# Patient Record
Sex: MALE | Race: BLACK OR AFRICAN AMERICAN | Employment: FULL TIME | URBAN - METROPOLITAN AREA
[De-identification: names, ages, dates, MRNs, and addresses within clinical notes are randomized per-mention and may not be internally consistent; named-entity substitution may affect disease eponyms.]

---

## 2017-01-16 ENCOUNTER — OFFICE VISIT (OUTPATIENT)
Dept: FAMILY MEDICINE CLINIC | Facility: CLINIC | Age: 42
End: 2017-01-16

## 2017-01-16 VITALS — SYSTOLIC BLOOD PRESSURE: 120 MMHG | RESPIRATION RATE: 16 BRPM | DIASTOLIC BLOOD PRESSURE: 80 MMHG | HEART RATE: 86 BPM

## 2017-01-16 DIAGNOSIS — IMO0001 UNCONTROLLED INSULIN DEPENDENT DIABETES MELLITUS: Primary | ICD-10-CM

## 2017-01-16 DIAGNOSIS — Z91.14 NON COMPLIANCE W MEDICATION REGIMEN: ICD-10-CM

## 2017-01-16 DIAGNOSIS — E78.00: ICD-10-CM

## 2017-01-16 PROCEDURE — 99214 OFFICE O/P EST MOD 30 MIN: CPT | Performed by: FAMILY MEDICINE

## 2017-01-17 NOTE — PROGRESS NOTES
Patient presents with: Follow - Up: diabetes re-check. Patito Dubon is a 35year old year old who presents for recheck of diabetes. Pt has been checking feet on a regular basis. Pt denies any tingling of the feet.  Pt denies any sx's of depress UNIT/ML Subcutaneous Solution, Inject 25 Units into the skin nightly., Disp: , Rfl:   •  Glucose Blood (SANJEEV CONTOUR NEXT TEST) In Vitro Strip, TEST THREE TIMES DAILY, Disp: 300 strip, Rfl: 0  •  Losartan Potassium 50 MG Oral Tab, Take 1 tablet (50 mg tot abdominal distention. Genitourinary: Negative for dysuria, hematuria and difficulty urinating. Musculoskeletal: Negative for myalgias, back pain, joint swelling, arthralgias and gait problem. Skin: Negative for color change and rash.    Neurological: Smokeless Status: Never Used                        Alcohol Use: Yes                Comment: rarely      Physical Exam  /80 mmHg  Pulse 86  Resp 16  Constitutional: Oriented to person, place, and time. No distress.    HEENT:  Normoc Preventing Acute Complications, Preventing Chronic Complications, Behavior Change Strategies, Risk Reduction Strategies   Reducing Risk: Daily foot checks, BP Control, Lipid Control, Dilated eye exam, Skin/dental care, Urine for microalbumin, Weight Contro

## 2017-02-02 RX ORDER — CALCIUM CARB/VITAMIN D3/VIT K1 500-100-40
TABLET,CHEWABLE ORAL
Qty: 100 EACH | Refills: 0 | Status: SHIPPED | OUTPATIENT
Start: 2017-02-02

## 2017-02-02 RX ORDER — LOSARTAN POTASSIUM 50 MG/1
50 TABLET ORAL
Qty: 90 TABLET | Refills: 0 | Status: SHIPPED | OUTPATIENT
Start: 2017-02-02 | End: 2017-03-31

## 2017-02-07 ENCOUNTER — TELEPHONE (OUTPATIENT)
Dept: FAMILY MEDICINE CLINIC | Facility: CLINIC | Age: 42
End: 2017-02-07

## 2017-02-07 NOTE — TELEPHONE ENCOUNTER
No need to fax referral to Dr. Ann Pinon. He is able to see referral for patient. Called patient and left message per HIPPA advising patient of this information. Advised patient to contact the office with any questions.

## 2017-02-08 ENCOUNTER — OFFICE VISIT (OUTPATIENT)
Dept: FAMILY MEDICINE CLINIC | Facility: CLINIC | Age: 42
End: 2017-02-08

## 2017-02-08 VITALS
DIASTOLIC BLOOD PRESSURE: 80 MMHG | RESPIRATION RATE: 16 BRPM | HEART RATE: 91 BPM | BODY MASS INDEX: 27 KG/M2 | TEMPERATURE: 99 F | OXYGEN SATURATION: 97 % | WEIGHT: 189 LBS | SYSTOLIC BLOOD PRESSURE: 118 MMHG

## 2017-02-08 DIAGNOSIS — M79.671 PAIN IN BOTH FEET: ICD-10-CM

## 2017-02-08 DIAGNOSIS — E11.42 TYPE 2 DIABETES MELLITUS WITH DIABETIC POLYNEUROPATHY, WITH LONG-TERM CURRENT USE OF INSULIN (HCC): Primary | ICD-10-CM

## 2017-02-08 DIAGNOSIS — R60.9 EDEMA, UNSPECIFIED TYPE: ICD-10-CM

## 2017-02-08 DIAGNOSIS — E78.5 HYPERLIPIDEMIA, UNSPECIFIED HYPERLIPIDEMIA TYPE: ICD-10-CM

## 2017-02-08 DIAGNOSIS — M79.672 PAIN IN BOTH FEET: ICD-10-CM

## 2017-02-08 DIAGNOSIS — G62.9 NEUROPATHY: ICD-10-CM

## 2017-02-08 DIAGNOSIS — M79.604 PAIN IN BOTH LOWER EXTREMITIES: ICD-10-CM

## 2017-02-08 DIAGNOSIS — M79.605 PAIN IN BOTH LOWER EXTREMITIES: ICD-10-CM

## 2017-02-08 DIAGNOSIS — Z79.4 TYPE 2 DIABETES MELLITUS WITH DIABETIC POLYNEUROPATHY, WITH LONG-TERM CURRENT USE OF INSULIN (HCC): Primary | ICD-10-CM

## 2017-02-08 PROCEDURE — 99214 OFFICE O/P EST MOD 30 MIN: CPT | Performed by: PHYSICIAN ASSISTANT

## 2017-02-08 RX ORDER — GABAPENTIN 300 MG/1
CAPSULE ORAL
Qty: 90 CAPSULE | Refills: 1 | Status: SHIPPED | OUTPATIENT
Start: 2017-02-08 | End: 2017-04-16

## 2017-02-08 NOTE — PROGRESS NOTES
HPI:   Himanshu Walker is a 35year old male who presents for recheck of his diabetes. He recently started taking Lantus for elevated A1C at 8.5 in December. He has noted since starting lantus his legs feel week and he has pain in both of his feet.  Pain LANCETS Does not apply Misc 1 lancet by Finger stick route daily. Disp: 1 Box Rfl: 0   aspirin (ASPIR-81) 81 MG Oral Tab EC Take 1 tablet by mouth daily.  Disp: 1 tablet Rfl: 0      No Known Allergies   Past Medical History   Diagnosis Date   • Type II or u starting low dose diuretic but patient does not want to start multiple medications   - Elevate feet and compression stockings-if swelling worsens he will start medication     Pain in both feet/pain in lower extremities/neuropathy   -     US ARTERIAL DUPLEX

## 2017-02-09 RX ORDER — GABAPENTIN 300 MG/1
CAPSULE ORAL
Qty: 270 CAPSULE | Refills: 1 | OUTPATIENT
Start: 2017-02-09

## 2017-03-31 RX ORDER — LOSARTAN POTASSIUM 50 MG/1
50 TABLET ORAL
Qty: 90 TABLET | Refills: 0 | Status: SHIPPED | OUTPATIENT
Start: 2017-03-31 | End: 2018-08-23

## 2017-04-06 ENCOUNTER — TELEPHONE (OUTPATIENT)
Dept: FAMILY MEDICINE CLINIC | Facility: CLINIC | Age: 42
End: 2017-04-06

## 2017-04-06 DIAGNOSIS — L30.9 ECZEMA, UNSPECIFIED TYPE: Primary | ICD-10-CM

## 2017-04-06 NOTE — TELEPHONE ENCOUNTER
referral request 1 and 2   Received: Yesterday       [facility]       Cc: [facility] Referral Pool       Phone Number: [phone]                     Patient Name:Farzad Castro   MIGUELITO:[birth year]   Reason for the orde

## 2017-04-10 NOTE — TELEPHONE ENCOUNTER
Called the pt, went over POC below. Pt agreed to have the [address] done first. Provided scheduling information. Also pt stated he has a FU appt with his dermatologist Dr. Prashanth Granger. Pt requested another referral. Beecher Goldmann to place referral for Dr. Prashanth Granger?  Last referr

## 2017-04-19 RX ORDER — GABAPENTIN 300 MG/1
CAPSULE ORAL
Qty: 270 CAPSULE | Refills: 0 | Status: SHIPPED | OUTPATIENT
Start: 2017-04-19 | End: 2017-09-29

## 2017-04-19 RX ORDER — GABAPENTIN 300 MG/1
300 CAPSULE ORAL 3 TIMES DAILY
Qty: 90 CAPSULE | Refills: 0 | Status: SHIPPED | OUTPATIENT
Start: 2017-04-19 | End: 2017-04-19

## 2017-04-23 ENCOUNTER — HOSPITAL ENCOUNTER (OUTPATIENT)
Dept: ULTRASOUND IMAGING | Age: 42
Discharge: HOME | End: 2017-04-23
Attending: PHYSICIAN ASSISTANT
Payer: COMMERCIAL

## 2017-04-23 DIAGNOSIS — M79.671 PAIN IN BOTH FEET: ICD-10-CM

## 2017-04-23 DIAGNOSIS — E11.42 TYPE 2 DIABETES MELLITUS WITH DIABETIC POLYNEUROPATHY, WITH LONG-TERM CURRENT USE OF INSULIN (HCC): ICD-10-CM

## 2017-04-23 DIAGNOSIS — M79.604 PAIN IN BOTH LOWER EXTREMITIES: ICD-10-CM

## 2017-04-23 DIAGNOSIS — R60.9 EDEMA, UNSPECIFIED TYPE: ICD-10-CM

## 2017-04-23 DIAGNOSIS — Z79.4 TYPE 2 DIABETES MELLITUS WITH DIABETIC POLYNEUROPATHY, WITH LONG-TERM CURRENT USE OF INSULIN (HCC): ICD-10-CM

## 2017-04-23 DIAGNOSIS — M79.605 PAIN IN BOTH LOWER EXTREMITIES: ICD-10-CM

## 2017-04-23 DIAGNOSIS — M79.672 PAIN IN BOTH FEET: ICD-10-CM

## 2017-04-23 PROCEDURE — 93925 LOWER EXTREMITY STUDY: CPT | Performed by: RADIOLOGY

## 2017-04-28 ENCOUNTER — OFFICE VISIT (OUTPATIENT)
Dept: FAMILY MEDICINE CLINIC | Facility: CLINIC | Age: 42
End: 2017-04-28

## 2017-04-28 VITALS
SYSTOLIC BLOOD PRESSURE: 130 MMHG | DIASTOLIC BLOOD PRESSURE: 78 MMHG | BODY MASS INDEX: 27 KG/M2 | WEIGHT: 190 LBS | TEMPERATURE: 99 F | RESPIRATION RATE: 16 BRPM | HEART RATE: 89 BPM | OXYGEN SATURATION: 98 %

## 2017-04-28 DIAGNOSIS — J30.2 SEASONAL ALLERGIC RHINITIS, UNSPECIFIED ALLERGIC RHINITIS TRIGGER: ICD-10-CM

## 2017-04-28 DIAGNOSIS — IMO0001 UNCONTROLLED INSULIN DEPENDENT DIABETES MELLITUS: Primary | ICD-10-CM

## 2017-04-28 DIAGNOSIS — I10 ESSENTIAL HYPERTENSION: ICD-10-CM

## 2017-04-28 DIAGNOSIS — E78.00 PURE HYPERCHOLESTEROLEMIA WITH TARGET LOW DENSITY LIPOPROTEIN (LDL) CHOLESTEROL LESS THAN 130 MG/DL: ICD-10-CM

## 2017-04-28 PROCEDURE — 99214 OFFICE O/P EST MOD 30 MIN: CPT | Performed by: FAMILY MEDICINE

## 2017-04-28 NOTE — PROGRESS NOTES
Patient presents with:  Sinus Problem: pt c\o of sinus, test results       Annalisa Mcintosh is a 35year old year old who presents for recheck of diabetes. Pt has been checking feet on a regular basis. Pt denies any tingling of the feet.  Pt denies any sx MOUTH THREE TIMES DAILY, Disp: 270 capsule, Rfl: 0  •  MetFORMIN HCl 1000 MG Oral Tab, TAKE 1 TABLET(1000 MG) BY MOUTH TWICE DAILY WITH MEALS, Disp: 180 tablet, Rfl: 0  •  Losartan Potassium 50 MG Oral Tab, Take 1 tablet (50 mg total) by mouth once daily. , distention. Genitourinary: Negative for dysuria, hematuria and difficulty urinating. Musculoskeletal: Negative for myalgias, back pain, joint swelling, arthralgias and gait problem. Skin: Negative for color change and rash.    Neurological: Negative f rarely      Physical Exam  /78 mmHg  Pulse 89  Temp(Src) 98.9 °F (37.2 °C) (Oral)  Resp 16  Wt 190 lb  SpO2 98%  Constitutional: Oriented to person, place, and time. No distress. HEENT:  Normocephalic and atraumatic.  Hearing and tympanic membranes Using Medications, Monitoring, Preventing Acute Complications, Preventing Chronic Complications, Behavior Change Strategies, Risk Reduction Strategies   Reducing Risk: Daily foot checks, BP Control, Lipid Control, Dilated eye exam, Skin/dental care, Urine

## 2017-05-08 ENCOUNTER — TELEPHONE (OUTPATIENT)
Dept: FAMILY MEDICINE CLINIC | Facility: CLINIC | Age: 42
End: 2017-05-08

## 2017-05-08 RX ORDER — MONTELUKAST SODIUM 10 MG/1
10 TABLET ORAL DAILY
Qty: 30 TABLET | Refills: 3 | Status: SHIPPED | OUTPATIENT
Start: 2017-05-08 | End: 2017-05-08

## 2017-05-08 RX ORDER — MONTELUKAST SODIUM 10 MG/1
10 TABLET ORAL DAILY
Qty: 30 TABLET | Refills: 3 | Status: SHIPPED | OUTPATIENT
Start: 2017-05-08 | End: 2017-09-21

## 2017-06-14 ENCOUNTER — TELEPHONE (OUTPATIENT)
Dept: FAMILY MEDICINE CLINIC | Facility: CLINIC | Age: 42
End: 2017-06-14

## 2017-06-14 ENCOUNTER — HOSPITAL ENCOUNTER (OUTPATIENT)
Dept: GENERAL RADIOLOGY | Age: 42
Discharge: HOME | End: 2017-06-14
Attending: PHYSICIAN ASSISTANT
Payer: COMMERCIAL

## 2017-06-14 ENCOUNTER — OFFICE VISIT (OUTPATIENT)
Dept: FAMILY MEDICINE CLINIC | Facility: CLINIC | Age: 42
End: 2017-06-14

## 2017-06-14 VITALS
OXYGEN SATURATION: 96 % | DIASTOLIC BLOOD PRESSURE: 78 MMHG | WEIGHT: 190 LBS | TEMPERATURE: 98 F | HEART RATE: 80 BPM | SYSTOLIC BLOOD PRESSURE: 112 MMHG | BODY MASS INDEX: 27 KG/M2 | RESPIRATION RATE: 16 BRPM

## 2017-06-14 DIAGNOSIS — E78.00 PURE HYPERCHOLESTEROLEMIA WITH TARGET LOW DENSITY LIPOPROTEIN (LDL) CHOLESTEROL LESS THAN 130 MG/DL: ICD-10-CM

## 2017-06-14 DIAGNOSIS — R07.81 RIB PAIN ON LEFT SIDE: Primary | ICD-10-CM

## 2017-06-14 DIAGNOSIS — IMO0001 UNCONTROLLED INSULIN DEPENDENT DIABETES MELLITUS: ICD-10-CM

## 2017-06-14 DIAGNOSIS — R07.81 RIB PAIN ON LEFT SIDE: ICD-10-CM

## 2017-06-14 PROCEDURE — 99213 OFFICE O/P EST LOW 20 MIN: CPT | Performed by: PHYSICIAN ASSISTANT

## 2017-06-14 PROCEDURE — 71101 X-RAY EXAM UNILAT RIBS/CHEST: CPT | Performed by: PHYSICIAN ASSISTANT

## 2017-06-14 NOTE — PROGRESS NOTES
Patient presents with:  Low Back Pain: Pt c/o LT low back pain, he fell in the shower         HPI:  Left lower side pain for one day. Patient fell in the shower this am. He landed on the left side. He has pain with coughing/laughing. No SOB. No chest pain. gallops  CHEST: +TTP left lower lateral ribs. Pain with flexion of the back   SKIN:no rashes/ecchymosis on the chest or back. Back:  Normal on inspection, no pain on palpation of the spinal and paraspinal muscles.  FROM       A/P    Rib pain on left side

## 2017-06-22 ENCOUNTER — TELEPHONE (OUTPATIENT)
Dept: FAMILY MEDICINE CLINIC | Facility: CLINIC | Age: 42
End: 2017-06-22

## 2017-06-22 DIAGNOSIS — E11.9 TYPE 2 DIABETES MELLITUS WITHOUT COMPLICATION, WITH LONG-TERM CURRENT USE OF INSULIN (HCC): Primary | ICD-10-CM

## 2017-06-22 DIAGNOSIS — Z79.4 TYPE 2 DIABETES MELLITUS WITHOUT COMPLICATION, WITH LONG-TERM CURRENT USE OF INSULIN (HCC): Primary | ICD-10-CM

## 2017-06-23 ENCOUNTER — OFFICE VISIT (OUTPATIENT)
Dept: FAMILY MEDICINE CLINIC | Facility: CLINIC | Age: 42
End: 2017-06-23

## 2017-06-23 VITALS
TEMPERATURE: 98 F | WEIGHT: 188 LBS | SYSTOLIC BLOOD PRESSURE: 124 MMHG | HEART RATE: 90 BPM | DIASTOLIC BLOOD PRESSURE: 78 MMHG | RESPIRATION RATE: 16 BRPM | BODY MASS INDEX: 27 KG/M2 | OXYGEN SATURATION: 97 %

## 2017-06-23 DIAGNOSIS — IMO0001 UNCONTROLLED INSULIN DEPENDENT DIABETES MELLITUS: ICD-10-CM

## 2017-06-23 DIAGNOSIS — B00.9 HERPES SIMPLEX: Primary | ICD-10-CM

## 2017-06-23 PROCEDURE — 99213 OFFICE O/P EST LOW 20 MIN: CPT | Performed by: PHYSICIAN ASSISTANT

## 2017-06-23 RX ORDER — VALACYCLOVIR HYDROCHLORIDE 500 MG/1
500 TABLET, FILM COATED ORAL 2 TIMES DAILY
Qty: 6 TABLET | Refills: 1 | Status: SHIPPED | OUTPATIENT
Start: 2017-06-23 | End: 2018-07-26

## 2017-06-23 NOTE — PROGRESS NOTES
CHIEF COMPLAINT:   Patient presents with:  Low Back Pain: Following up on low back pain due to fall  Rash: Pt c/o rash on RT arm X 3 days       HPI:     Flaquita Oseguera is a 38year old male who presents with concerns of rash on his right upper arm for 3 Alcohol Use: Yes                Comment: rarely       REVIEW OF SYSTEMS:   GENERAL: feels well otherwise, no fever, no chills. SKIN: as above. CHEST: no chest pains, no palpitations.   LUNGS: denies shortness of breath with exertion or rest. No wheezing,

## 2017-07-26 ENCOUNTER — TELEPHONE (OUTPATIENT)
Dept: FAMILY MEDICINE CLINIC | Facility: CLINIC | Age: 42
End: 2017-07-26

## 2017-08-03 NOTE — TELEPHONE ENCOUNTER
LF: 4/17/2017    LOV: 4/28/2017    Pending Prescriptions Disp Refills    METFORMIN HCL 1000 MG Oral Tab [Pharmacy Med Name: METFORMIN 1000MG TABLETS] 180 tablet 0     Sig: TAKE 1 TABLET(1000 MG) BY MOUTH TWICE DAILY WITH MEALS         Pt needs labs last Hg

## 2017-09-13 ENCOUNTER — MED REC SCAN ONLY (OUTPATIENT)
Dept: FAMILY MEDICINE CLINIC | Facility: CLINIC | Age: 42
End: 2017-09-13

## 2017-09-21 RX ORDER — MONTELUKAST SODIUM 10 MG/1
TABLET ORAL
Qty: 30 TABLET | Refills: 0 | Status: SHIPPED | OUTPATIENT
Start: 2017-09-21 | End: 2017-10-23

## 2017-10-01 NOTE — TELEPHONE ENCOUNTER
LF: 04/19/2017   LOV: 06/23/2017    Pending Prescriptions Disp Refills    GABAPENTIN 300 MG Oral Cap [Pharmacy Med Name: GABAPENTIN 300MG CAPSULES] 270 capsule 0     Sig: TAKE 1 CAPSULE(300 MG) BY MOUTH THREE TIMES DAILY          Please approve or deny Rx.

## 2017-10-02 RX ORDER — GABAPENTIN 300 MG/1
CAPSULE ORAL
Qty: 270 CAPSULE | Refills: 0 | OUTPATIENT
Start: 2017-10-02

## 2017-10-02 RX ORDER — GABAPENTIN 300 MG/1
300 CAPSULE ORAL 3 TIMES DAILY
Qty: 90 CAPSULE | Refills: 0 | Status: SHIPPED | OUTPATIENT
Start: 2017-10-02 | End: 2017-11-27

## 2017-10-13 ENCOUNTER — MED REC SCAN ONLY (OUTPATIENT)
Dept: FAMILY MEDICINE CLINIC | Facility: CLINIC | Age: 42
End: 2017-10-13

## 2017-10-23 RX ORDER — MONTELUKAST SODIUM 10 MG/1
TABLET ORAL
Qty: 30 TABLET | Refills: 0 | Status: SHIPPED | OUTPATIENT
Start: 2017-10-23 | End: 2017-11-27

## 2017-10-23 NOTE — TELEPHONE ENCOUNTER
Medication(s) to Refill:   Pending Prescriptions Disp Refills    MONTELUKAST SODIUM 10 MG Oral Tab [Pharmacy Med Name: MONTELUKAST 10MG TABLETS] 30 tablet 0     Sig: TAKE 1 TABLET(10 MG) BY MOUTH DAILY           Last Time Medication was Filled:  09/21/17

## 2017-10-25 ENCOUNTER — TELEPHONE (OUTPATIENT)
Dept: FAMILY MEDICINE CLINIC | Facility: CLINIC | Age: 42
End: 2017-10-25

## 2017-10-25 DIAGNOSIS — Z13.0 SCREENING FOR ENDOCRINE, METABOLIC AND IMMUNITY DISORDER: ICD-10-CM

## 2017-10-25 DIAGNOSIS — IMO0001 UNCONTROLLED TYPE 2 DIABETES MELLITUS WITHOUT COMPLICATION, WITHOUT LONG-TERM CURRENT USE OF INSULIN: Primary | ICD-10-CM

## 2017-10-25 DIAGNOSIS — Z13.228 SCREENING FOR ENDOCRINE, METABOLIC AND IMMUNITY DISORDER: ICD-10-CM

## 2017-10-25 DIAGNOSIS — Z12.5 SCREENING FOR PROSTATE CANCER: ICD-10-CM

## 2017-10-25 DIAGNOSIS — Z13.29 SCREENING FOR ENDOCRINE, METABOLIC AND IMMUNITY DISORDER: ICD-10-CM

## 2017-10-25 NOTE — TELEPHONE ENCOUNTER
LVM for patient reminding him to do labs and schedule with Dr. Pete Lockett. Will also send [facility] message.

## 2017-10-27 ENCOUNTER — TELEPHONE (OUTPATIENT)
Dept: FAMILY MEDICINE CLINIC | Facility: CLINIC | Age: 42
End: 2017-10-27

## 2017-10-27 DIAGNOSIS — L30.9 ECZEMA, UNSPECIFIED TYPE: Primary | ICD-10-CM

## 2017-10-27 NOTE — TELEPHONE ENCOUNTER
Dr Yolis Vyas office is calling to request a referral for patient's annual check up, scheduled today.

## 2017-10-30 ENCOUNTER — TELEPHONE (OUTPATIENT)
Dept: FAMILY MEDICINE CLINIC | Facility: CLINIC | Age: 42
End: 2017-10-30

## 2017-11-14 ENCOUNTER — TELEPHONE (OUTPATIENT)
Dept: FAMILY MEDICINE CLINIC | Facility: CLINIC | Age: 42
End: 2017-11-14

## 2017-11-14 ENCOUNTER — LAB ENCOUNTER (OUTPATIENT)
Dept: LAB | Age: 42
End: 2017-11-14
Attending: FAMILY MEDICINE
Payer: COMMERCIAL

## 2017-11-14 DIAGNOSIS — IMO0001 UNCONTROLLED TYPE 2 DIABETES MELLITUS WITHOUT COMPLICATION, WITHOUT LONG-TERM CURRENT USE OF INSULIN: ICD-10-CM

## 2017-11-14 DIAGNOSIS — Z13.0 SCREENING FOR ENDOCRINE, METABOLIC AND IMMUNITY DISORDER: ICD-10-CM

## 2017-11-14 DIAGNOSIS — Z13.228 SCREENING FOR ENDOCRINE, METABOLIC AND IMMUNITY DISORDER: ICD-10-CM

## 2017-11-14 DIAGNOSIS — Z79.4 TYPE 2 DIABETES MELLITUS WITHOUT COMPLICATION, WITH LONG-TERM CURRENT USE OF INSULIN (HCC): ICD-10-CM

## 2017-11-14 DIAGNOSIS — E78.5 HYPERLIPIDEMIA, UNSPECIFIED HYPERLIPIDEMIA TYPE: ICD-10-CM

## 2017-11-14 DIAGNOSIS — Z13.29 SCREENING FOR ENDOCRINE, METABOLIC AND IMMUNITY DISORDER: ICD-10-CM

## 2017-11-14 DIAGNOSIS — E11.42 TYPE 2 DIABETES MELLITUS WITH DIABETIC POLYNEUROPATHY, WITH LONG-TERM CURRENT USE OF INSULIN (HCC): ICD-10-CM

## 2017-11-14 DIAGNOSIS — E11.9 TYPE 2 DIABETES MELLITUS WITHOUT COMPLICATION, WITH LONG-TERM CURRENT USE OF INSULIN (HCC): ICD-10-CM

## 2017-11-14 DIAGNOSIS — Z79.4 TYPE 2 DIABETES MELLITUS WITH DIABETIC POLYNEUROPATHY, WITH LONG-TERM CURRENT USE OF INSULIN (HCC): ICD-10-CM

## 2017-11-14 DIAGNOSIS — Z12.5 SCREENING FOR PROSTATE CANCER: ICD-10-CM

## 2017-11-14 PROCEDURE — 80061 LIPID PANEL: CPT

## 2017-11-14 PROCEDURE — 80053 COMPREHEN METABOLIC PANEL: CPT

## 2017-11-14 PROCEDURE — 36415 COLL VENOUS BLD VENIPUNCTURE: CPT

## 2017-11-14 PROCEDURE — 82043 UR ALBUMIN QUANTITATIVE: CPT

## 2017-11-14 PROCEDURE — 82306 VITAMIN D 25 HYDROXY: CPT

## 2017-11-14 PROCEDURE — 84443 ASSAY THYROID STIM HORMONE: CPT

## 2017-11-14 PROCEDURE — 83036 HEMOGLOBIN GLYCOSYLATED A1C: CPT

## 2017-11-14 PROCEDURE — 82570 ASSAY OF URINE CREATININE: CPT

## 2017-11-14 PROCEDURE — 85025 COMPLETE CBC W/AUTO DIFF WBC: CPT

## 2017-11-17 ENCOUNTER — TELEPHONE (OUTPATIENT)
Dept: FAMILY MEDICINE CLINIC | Facility: CLINIC | Age: 42
End: 2017-11-17

## 2017-11-17 DIAGNOSIS — IMO0001 UNCONTROLLED INSULIN DEPENDENT DIABETES MELLITUS: Primary | ICD-10-CM

## 2017-11-17 NOTE — TELEPHONE ENCOUNTER
----- Message from Chadwick Barrow PA-C sent at 11/15/2017 11:56 AM CST -----  A1C has increased. Very poor control. Patient needs to see DM clinic-[facility]ou[facility]. Elevated cholesterol-start atorvastatin 10 mg daily. Low fat diet/exercise.  Repeat Lipid

## 2017-11-21 RX ORDER — ATORVASTATIN CALCIUM 10 MG/1
10 TABLET, FILM COATED ORAL NIGHTLY
Qty: 30 TABLET | Refills: 2 | Status: SHIPPED | OUTPATIENT
Start: 2017-11-21 | End: 2018-06-08

## 2017-11-21 NOTE — TELEPHONE ENCOUNTER
Called and spoke with pt. Pt informed of lab results below. Pt states understanding and agrees to plan. Rx sent to pharmacy.  Pt states he will getthe contact information for the DM clinic when he is in the office next week for his physical. Pt has no furth

## 2017-11-27 RX ORDER — GABAPENTIN 300 MG/1
CAPSULE ORAL
Qty: 90 CAPSULE | Refills: 0 | Status: SHIPPED | OUTPATIENT
Start: 2017-11-27 | End: 2018-01-29

## 2017-11-27 RX ORDER — MONTELUKAST SODIUM 10 MG/1
TABLET ORAL
Qty: 30 TABLET | Refills: 0 | Status: SHIPPED | OUTPATIENT
Start: 2017-11-27 | End: 2018-01-17

## 2017-11-27 NOTE — TELEPHONE ENCOUNTER
Medication(s) to Refill:   Pending Prescriptions Disp Refills    GABAPENTIN 300 MG Oral Cap [Pharmacy Med Name: GABAPENTIN 300MG CAPSULES] 90 capsule 0     Sig: TAKE 1 CAPSULE(300 MG) BY MOUTH THREE TIMES DAILY           Last Time Medication was Filled:  1

## 2017-11-28 ENCOUNTER — OFFICE VISIT (OUTPATIENT)
Dept: FAMILY MEDICINE CLINIC | Facility: CLINIC | Age: 42
End: 2017-11-28

## 2017-11-28 VITALS
DIASTOLIC BLOOD PRESSURE: 70 MMHG | SYSTOLIC BLOOD PRESSURE: 118 MMHG | HEIGHT: 71 IN | OXYGEN SATURATION: 99 % | HEART RATE: 82 BPM | WEIGHT: 186 LBS | RESPIRATION RATE: 16 BRPM | TEMPERATURE: 98 F | BODY MASS INDEX: 26.04 KG/M2

## 2017-11-28 DIAGNOSIS — Z00.00 ANNUAL PHYSICAL EXAM: Primary | ICD-10-CM

## 2017-11-28 DIAGNOSIS — IMO0001 UNCONTROLLED INSULIN DEPENDENT DIABETES MELLITUS: ICD-10-CM

## 2017-11-28 DIAGNOSIS — E78.00 PURE HYPERCHOLESTEROLEMIA WITH TARGET LOW DENSITY LIPOPROTEIN (LDL) CHOLESTEROL LESS THAN 130 MG/DL: ICD-10-CM

## 2017-11-28 DIAGNOSIS — Z23 NEEDS FLU SHOT: ICD-10-CM

## 2017-11-28 PROCEDURE — 90471 IMMUNIZATION ADMIN: CPT | Performed by: FAMILY MEDICINE

## 2017-11-28 PROCEDURE — 99214 OFFICE O/P EST MOD 30 MIN: CPT | Performed by: FAMILY MEDICINE

## 2017-11-28 PROCEDURE — 99396 PREV VISIT EST AGE 40-64: CPT | Performed by: FAMILY MEDICINE

## 2017-11-28 PROCEDURE — 90686 IIV4 VACC NO PRSV 0.5 ML IM: CPT | Performed by: FAMILY MEDICINE

## 2017-11-28 NOTE — PROGRESS NOTES
Patient presents with:  Physical: lab results       Emi Lam is a 38year old year old who presents for recheck of diabetes. Pt has been checking feet on a regular basis. Pt denies any tingling of the feet. Pt denies any sx's of depression.   Pt c - -   PHQ2 Date - - - - - - - -   PHQ2 Score - - - - - - - -   PHQ4 Score - - - - - - - -       Current medications:   Current Outpatient Prescriptions:   •  insulin glargine (LANTUS) 100 UNIT/ML Subcutaneous Solution, Inject 100 Units into the skin nightl given: Not Answered      Immunization History   Administered Date(s) Administered   • Fluad High dose 65 yr and older (25871) 11/28/2017   • Influenza 10/30/2014   • Influenza Vaccine, No Preserv, 3YR + 10/20/2015, 10/31/2016   • TDAP 12/30/2009       Revi nightly. Disp: 30 tablet Rfl: 2   MetFORMIN HCl 1000 MG Oral Tab TAKE 1 TABLET(1000 MG) BY MOUTH TWICE DAILY WITH MEALS Disp: 180 tablet Rfl: 0   Losartan Potassium 50 MG Oral Tab Take 1 tablet (50 mg total) by mouth once daily.  Disp: 90 tablet Rfl: 0   In tender, no masses, no organomegaly or hernias. Musculoskeletal: Normal range of motion. No edema and no tenderness. No effusions. Lymphadenopathy: No cervical adenopathy. Neurological: Normal reflexes. No cranial nerve deficit or sensory deficit.  Nnor this Visit:  Signed Prescriptions Disp Refills    insulin glargine (LANTUS) 100 UNIT/ML Subcutaneous Solution 4 pen 0      Sig: Inject 100 Units into the skin nightly.  Lot: [id number]: 01/2020           Imaging & Consults:  FLU VACCINE ADJUVANT IM    Follow

## 2017-12-04 ENCOUNTER — TELEPHONE (OUTPATIENT)
Dept: FAMILY MEDICINE CLINIC | Facility: CLINIC | Age: 42
End: 2017-12-04

## 2017-12-04 NOTE — TELEPHONE ENCOUNTER
[facility]  Pharmacy will give him some to hold him over

## 2017-12-06 RX ORDER — LOSARTAN POTASSIUM 50 MG/1
TABLET ORAL
Qty: 90 TABLET | Refills: 0 | Status: SHIPPED | OUTPATIENT
Start: 2017-12-06 | End: 2018-01-29

## 2017-12-20 RX ORDER — LOSARTAN POTASSIUM 50 MG/1
TABLET ORAL
Qty: 90 TABLET | Refills: 0 | OUTPATIENT
Start: 2017-12-20

## 2018-01-17 ENCOUNTER — TELEPHONE (OUTPATIENT)
Dept: FAMILY MEDICINE CLINIC | Facility: CLINIC | Age: 43
End: 2018-01-17

## 2018-01-17 DIAGNOSIS — IMO0001 UNCONTROLLED INSULIN DEPENDENT DIABETES MELLITUS: Primary | ICD-10-CM

## 2018-01-17 RX ORDER — MONTELUKAST SODIUM 10 MG/1
TABLET ORAL
Qty: 90 TABLET | Refills: 0 | Status: SHIPPED | OUTPATIENT
Start: 2018-01-17 | End: 2018-05-16

## 2018-01-17 NOTE — TELEPHONE ENCOUNTER
Pt was called and was informed that he's due for a diabetic eye exam. He verbalized understanding and said he will call and make the mojgan.  He was also reminded about getting his diabetic labs done in Feb.

## 2018-01-30 RX ORDER — LOSARTAN POTASSIUM 50 MG/1
TABLET ORAL
Qty: 90 TABLET | Refills: 0 | Status: SHIPPED | OUTPATIENT
Start: 2018-01-30 | End: 2018-03-13

## 2018-01-30 RX ORDER — GABAPENTIN 300 MG/1
CAPSULE ORAL
Qty: 90 CAPSULE | Refills: 0 | Status: SHIPPED | OUTPATIENT
Start: 2018-01-30 | End: 2018-04-11

## 2018-03-13 ENCOUNTER — TELEPHONE (OUTPATIENT)
Dept: FAMILY MEDICINE CLINIC | Facility: CLINIC | Age: 43
End: 2018-03-13

## 2018-03-13 ENCOUNTER — OFFICE VISIT (OUTPATIENT)
Dept: FAMILY MEDICINE CLINIC | Facility: CLINIC | Age: 43
End: 2018-03-13

## 2018-03-13 VITALS
TEMPERATURE: 99 F | SYSTOLIC BLOOD PRESSURE: 108 MMHG | DIASTOLIC BLOOD PRESSURE: 86 MMHG | OXYGEN SATURATION: 99 % | HEIGHT: 71 IN | WEIGHT: 157 LBS | BODY MASS INDEX: 21.98 KG/M2 | HEART RATE: 74 BPM
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
## 2018-03-13 DIAGNOSIS — M62.838 MUSCLE SPASM: ICD-10-CM
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
## 2018-03-13 DIAGNOSIS — L30.9 ECZEMA, UNSPECIFIED TYPE: ICD-10-CM

## 2018-03-13 DIAGNOSIS — IMO0001 UNCONTROLLED INSULIN DEPENDENT DIABETES MELLITUS: ICD-10-CM

## 2018-03-13 DIAGNOSIS — M54.2 NECK PAIN ON LEFT SIDE: ICD-10-CM

## 2018-03-13 PROCEDURE — 99214 OFFICE O/P EST MOD 30 MIN: CPT | Performed by: FAMILY MEDICINE

## 2018-03-13 RX ORDER — CYCLOBENZAPRINE HCL 10 MG
10 TABLET ORAL NIGHTLY PRN
Qty: 20 TABLET | Refills: 0 | Status: SHIPPED | OUTPATIENT
Start: 2018-03-13 | End: 2018-04-02

## 2018-03-13 RX ORDER — DICLOFENAC SODIUM 75 MG/1
TABLET, DELAYED RELEASE ORAL
Qty: 180 TABLET | Refills: 3 | Status: SHIPPED | OUTPATIENT
Start: 2018-03-13 | End: 2018-08-23

## 2018-03-13 RX ORDER — DICLOFENAC SODIUM 75 MG/1
75 TABLET, DELAYED RELEASE ORAL 2 TIMES DAILY PRN
Qty: 30 TABLET | Refills: 3 | Status: SHIPPED | OUTPATIENT
Start: 2018-03-13 | End: 2018-03-13
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
## 2018-03-27 ENCOUNTER — HOSPITAL ENCOUNTER (OUTPATIENT)
Dept: GENERAL RADIOLOGY | Age: 43
Discharge: HOME | End: 2018-03-27
Attending: FAMILY MEDICINE
Payer: COMMERCIAL

## 2018-03-27 DIAGNOSIS — M62.838 MUSCLE SPASM: ICD-10-CM

## 2018-03-27 DIAGNOSIS — M54.2 NECK PAIN ON LEFT SIDE: ICD-10-CM
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
## 2018-03-27 PROCEDURE — 73030 X-RAY EXAM OF SHOULDER: CPT | Performed by: FAMILY MEDICINE

## 2018-03-27 PROCEDURE — 72050 X-RAY EXAM NECK SPINE 4/5VWS: CPT | Performed by: FAMILY MEDICINE

## 2018-03-28 ENCOUNTER — TELEPHONE (OUTPATIENT)
Dept: FAMILY MEDICINE CLINIC | Facility: CLINIC | Age: 43
End: 2018-03-28

## 2018-03-28 ENCOUNTER — PATIENT OUTREACH (OUTPATIENT)
Dept: FAMILY MEDICINE CLINIC | Facility: CLINIC | Age: 43
End: 2018-03-28

## 2018-03-28 NOTE — TELEPHONE ENCOUNTER
----- Message from Junita Duane, MD sent at 3/27/2018  4:53 PM CDT -----  Mild arthritis, no fracture.

## 2018-03-28 NOTE — TELEPHONE ENCOUNTER
XR SHOULDER, COMPLETE (MIN 2 VIEWS), LEFT (CPT=73030)   Order: [id number]   Status:  Final result   Visible to patient:  Yes (MyChart)   Dx:  Muscle spasm; Neck pain on left side;. ..    Notes recorded by Suhail Gee MD on 3/27/2018 at 4:53 PM CDT  Negative

## 2018-04-04 ENCOUNTER — MED REC SCAN ONLY (OUTPATIENT)
Dept: FAMILY MEDICINE CLINIC | Facility: CLINIC | Age: 43
End: 2018-04-04

## 2018-04-06 ENCOUNTER — OFFICE VISIT (OUTPATIENT)
Dept: PHYSICAL THERAPY | Age: 43
End: 2018-04-06
Attending: FAMILY MEDICINE
Payer: COMMERCIAL
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
## 2018-04-06 DIAGNOSIS — M62.838 MUSCLE SPASM: ICD-10-CM

## 2018-04-06 DIAGNOSIS — M54.2 NECK PAIN ON LEFT SIDE: ICD-10-CM

## 2018-04-06 PROCEDURE — 97161 PT EVAL LOW COMPLEX 20 MIN: CPT

## 2018-04-06 PROCEDURE — 97530 THERAPEUTIC ACTIVITIES: CPT
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
## 2018-04-09 ENCOUNTER — APPOINTMENT (OUTPATIENT)
Dept: PHYSICAL THERAPY | Age: 43
End: 2018-04-09
Attending: FAMILY MEDICINE
Payer: COMMERCIAL

## 2018-04-10 ENCOUNTER — OFFICE VISIT (OUTPATIENT)
Dept: PHYSICAL THERAPY | Age: 43
End: 2018-04-10
Attending: FAMILY MEDICINE
Payer: COMMERCIAL

## 2018-04-10 PROCEDURE — 97140 MANUAL THERAPY 1/> REGIONS: CPT

## 2018-04-10 PROCEDURE — 97110 THERAPEUTIC EXERCISES: CPT

## 2018-04-10 NOTE — PROGRESS NOTES
Dx: Cervical Strain         Authorized # of Visits:  8         Next MD visit: none scheduled  Fall Risk: standard         Precautions: n/a             Subjective: States that the Allen Gonzalez has been more sore over the weekend as he was doing more exercises.

## 2018-04-11 ENCOUNTER — TELEPHONE (OUTPATIENT)
Dept: FAMILY MEDICINE CLINIC | Facility: CLINIC | Age: 43
End: 2018-04-11

## 2018-04-11 DIAGNOSIS — IMO0001 UNCONTROLLED INSULIN DEPENDENT DIABETES MELLITUS: ICD-10-CM

## 2018-04-11 RX ORDER — GABAPENTIN 300 MG/1
CAPSULE ORAL
Qty: 90 CAPSULE | Refills: 0 | Status: SHIPPED | OUTPATIENT
Start: 2018-04-11 | End: 2018-07-09

## 2018-04-11 NOTE — TELEPHONE ENCOUNTER
Gabapentin LF: 1/30/18  Lantus LF: 11/28/17   LOV: 3/13/18  Please approve or deny pending Rx. Thank you!

## 2018-04-11 NOTE — TELEPHONE ENCOUNTER
Please reach out to patient to have him see you. Uncontrolled diabetic and non compliant with treatment.

## 2018-04-12 RX ORDER — GABAPENTIN 300 MG/1
CAPSULE ORAL
Qty: 90 CAPSULE | Refills: 0 | OUTPATIENT
Start: 2018-04-12

## 2018-04-13 ENCOUNTER — APPOINTMENT (OUTPATIENT)
Dept: PHYSICAL THERAPY | Age: 43
End: 2018-04-13
Attending: FAMILY MEDICINE
Payer: COMMERCIAL

## 2018-04-13 DIAGNOSIS — IMO0001 UNCONTROLLED INSULIN DEPENDENT DIABETES MELLITUS: ICD-10-CM

## 2018-04-21 ENCOUNTER — TELEPHONE (OUTPATIENT)
Dept: FAMILY MEDICINE CLINIC | Facility: CLINIC | Age: 43
End: 2018-04-21

## 2018-05-07 ENCOUNTER — MED REC SCAN ONLY (OUTPATIENT)
Dept: FAMILY MEDICINE CLINIC | Facility: CLINIC | Age: 43
End: 2018-05-07

## 2018-05-07 ENCOUNTER — PATIENT OUTREACH (OUTPATIENT)
Dept: FAMILY MEDICINE CLINIC | Facility: CLINIC | Age: 43
End: 2018-05-07

## 2018-05-07 RX ORDER — LOSARTAN POTASSIUM 50 MG/1
TABLET ORAL
Qty: 90 TABLET | Refills: 0 | Status: SHIPPED | OUTPATIENT
Start: 2018-05-07 | End: 2018-07-30

## 2018-05-10 ENCOUNTER — OFFICE VISIT (OUTPATIENT)
Dept: FAMILY MEDICINE CLINIC | Facility: CLINIC | Age: 43
End: 2018-05-10

## 2018-05-10 VITALS
TEMPERATURE: 98 F | SYSTOLIC BLOOD PRESSURE: 128 MMHG | WEIGHT: 190 LBS | HEIGHT: 71 IN | HEART RATE: 85 BPM | OXYGEN SATURATION: 97 % | DIASTOLIC BLOOD PRESSURE: 82 MMHG | BODY MASS INDEX: 26.6 KG/M2 | RESPIRATION RATE: 16 BRPM

## 2018-05-10 DIAGNOSIS — H60.331 ACUTE SWIMMER'S EAR OF RIGHT SIDE: Primary | ICD-10-CM

## 2018-05-10 PROCEDURE — 99213 OFFICE O/P EST LOW 20 MIN: CPT | Performed by: NURSE PRACTITIONER

## 2018-05-10 RX ORDER — OFLOXACIN 3 MG/ML
10 SOLUTION AURICULAR (OTIC) DAILY
Qty: 5 ML | Refills: 0 | Status: SHIPPED | OUTPATIENT
Start: 2018-05-10 | End: 2018-05-17

## 2018-05-10 NOTE — PROGRESS NOTES
CHIEF COMPLAINT:   Patient presents with:  Ear Problem: clogged on Right side. Allergy sxs coughing, congestion    HPI:   Nick Maki is a 37year old male who presents to clinic today with complaints of right ear pain. Has had for 5 days.  Pain is insulin glargine (LANTUS) 100 UNIT/ML Subcutaneous Solution Inject 25 Units into the skin nightly. Disp:  Rfl:    aspirin (ASPIR-81) 81 MG Oral Tab EC Take 1 tablet by mouth daily.  Disp: 1 tablet Rfl: 0      Past Medical History:   Diagnosis Date   • Type 1. Acute swimmer's ear of right side  - ofloxacin 0.3 % Otic Solution; Place 10 drops into the right ear daily. Dispense: 5 mL; Refill: 0    PLAN: Meds as listed below.   Comfort measures as described in Patient Instructions    Meds & Refills for this Visi · You may use acetaminophen or ibuprofen to control pain, unless another medicine was prescribed.  Note: If you have chronic liver or kidney disease or ever had a stomach ulcer or GI bleeding, talk to your healthcare provider before taking any of these medi

## 2018-05-10 NOTE — PATIENT INSTRUCTIONS
External Ear Infection (Adult)    External otitis (also called “swimmer’s ear”) is an infection in the ear canal. It is often caused by bacteria or fungus. It can occur a few days after water gets trapped in the ear canal (from swimming or bathing).  It Call your healthcare provider right away if any of these occur:  · Ear pain becomes worse or doesn’t improve after 3 days of treatment  · Redness or swelling of the outer ear occurs or gets worse  · Headache  · Painful or stiff neck  · Drowsiness or confus

## 2018-05-16 RX ORDER — MONTELUKAST SODIUM 10 MG/1
TABLET ORAL
Qty: 90 TABLET | Refills: 0 | Status: SHIPPED | OUTPATIENT
Start: 2018-05-16 | End: 2018-07-13

## 2018-05-16 NOTE — TELEPHONE ENCOUNTER
Medication(s) to Refill:   Pending Prescriptions Disp Refills    MONTELUKAST SODIUM 10 MG Oral Tab [Pharmacy Med Name: MONTELUKAST 10MG TABLETS] 90 tablet 0     Sig: TAKE 1 TABLET(10 MG) BY MOUTH DAILY       LVM FOR PT TO CALL BACK TO Denny Hernandez.

## 2018-05-16 NOTE — TELEPHONE ENCOUNTER
Patient was calling for a status of the following medication sent to the [facility] in [place] :  MONTELUKAST SODIUM 10 MG Oral Tab 90 tablet 0 1/17/2018    Sig :  TAKE 1 TABLET(10 MG) BY MOUTH DAILY         Patient needs this today for his allergies.

## 2018-05-30 ENCOUNTER — PATIENT OUTREACH (OUTPATIENT)
Dept: FAMILY MEDICINE CLINIC | Facility: CLINIC | Age: 43
End: 2018-05-30

## 2018-06-01 ENCOUNTER — TELEPHONE (OUTPATIENT)
Dept: FAMILY MEDICINE CLINIC | Facility: CLINIC | Age: 43
End: 2018-06-01

## 2018-06-01 DIAGNOSIS — IMO0001 UNCONTROLLED INSULIN DEPENDENT DIABETES MELLITUS: ICD-10-CM

## 2018-06-01 NOTE — TELEPHONE ENCOUNTER
Dr. Tracy Chandler called and spoke to patient regarding Diabetes.  Patient informed due for labs and will complete and follow up with Dr. Tracy Chandler.

## 2018-06-05 RX ORDER — BLOOD SUGAR DIAGNOSTIC
STRIP MISCELLANEOUS
Qty: 300 STRIP | Refills: 0 | Status: SHIPPED | OUTPATIENT
Start: 2018-06-05

## 2018-06-05 RX ORDER — LANCETS
EACH MISCELLANEOUS
Qty: 300 EACH | Refills: 0 | Status: SHIPPED | OUTPATIENT
Start: 2018-06-05

## 2018-06-05 RX ORDER — LANCETS 30 GAUGE
EACH MISCELLANEOUS
Qty: 200 EACH | Refills: 0 | Status: SHIPPED | OUTPATIENT
Start: 2018-06-05 | End: 2018-06-05

## 2018-06-05 RX ORDER — BLOOD-GLUCOSE METER
1 EACH MISCELLANEOUS DAILY
Qty: 1 KIT | Refills: 0 | Status: SHIPPED | OUTPATIENT
Start: 2018-06-05

## 2018-06-11 RX ORDER — ATORVASTATIN CALCIUM 10 MG/1
TABLET, FILM COATED ORAL
Qty: 30 TABLET | Refills: 0 | Status: SHIPPED | OUTPATIENT
Start: 2018-06-11 | End: 2018-07-13

## 2018-06-11 NOTE — TELEPHONE ENCOUNTER
lvm to call back regarding his medication request refill  Pt did not state his last name on his vm so no med info was left

## 2018-06-13 NOTE — TELEPHONE ENCOUNTER
LVM for patient to [facility] office so we can inform him that labs are needed. Could not leave a detailed message on VM due to patient not having his name in his VM.

## 2018-07-09 RX ORDER — LANCETS
EACH MISCELLANEOUS
Qty: 100 EACH | Refills: 0 | Status: SHIPPED | OUTPATIENT
Start: 2018-07-09 | End: 2018-08-14

## 2018-07-10 RX ORDER — GABAPENTIN 300 MG/1
CAPSULE ORAL
Qty: 90 CAPSULE | Refills: 0 | Status: SHIPPED | OUTPATIENT
Start: 2018-07-10 | End: 2019-01-04

## 2018-07-10 NOTE — TELEPHONE ENCOUNTER
Medication(s) to Refill:   Pending Prescriptions Disp Refills    GABAPENTIN 300 MG Oral Cap [Pharmacy Med Name: GABAPENTIN 300MG CAPSULES] 90 capsule 0     Sig: TAKE 1 CAPSULE(300 MG) BY MOUTH THREE TIMES DAILY             Reason for Medication Refill bein

## 2018-07-13 DIAGNOSIS — IMO0001 UNCONTROLLED INSULIN DEPENDENT DIABETES MELLITUS: Primary | ICD-10-CM

## 2018-07-13 RX ORDER — ATORVASTATIN CALCIUM 10 MG/1
TABLET, FILM COATED ORAL
Qty: 30 TABLET | Refills: 0 | Status: SHIPPED | OUTPATIENT
Start: 2018-07-13 | End: 2018-07-13

## 2018-07-13 RX ORDER — ATORVASTATIN CALCIUM 10 MG/1
TABLET, FILM COATED ORAL
Qty: 90 TABLET | Refills: 0 | Status: SHIPPED | OUTPATIENT
Start: 2018-07-13 | End: 2018-10-19

## 2018-07-13 RX ORDER — MONTELUKAST SODIUM 10 MG/1
TABLET ORAL
Qty: 90 TABLET | Refills: 0 | Status: SHIPPED | OUTPATIENT
Start: 2018-07-13 | End: 2018-08-23

## 2018-07-16 NOTE — TELEPHONE ENCOUNTER
S/W patient informed him medication was approved and that he will need to come in sayda lab work before his next refill.

## 2018-07-26 DIAGNOSIS — B00.9 HERPES SIMPLEX: ICD-10-CM

## 2018-07-27 RX ORDER — VALACYCLOVIR HYDROCHLORIDE 500 MG/1
TABLET, FILM COATED ORAL
Qty: 6 TABLET | Refills: 0 | Status: SHIPPED | OUTPATIENT
Start: 2018-07-27 | End: 2019-01-04

## 2018-07-30 RX ORDER — LOSARTAN POTASSIUM 50 MG/1
TABLET ORAL
Qty: 90 TABLET | Refills: 0 | Status: SHIPPED | OUTPATIENT
Start: 2018-07-30 | End: 2018-10-29

## 2018-08-08 NOTE — TELEPHONE ENCOUNTER
Patient made appointment for 8/21/18 and will come in for labs. Patient is aware that medication was sent to the pharmacy.

## 2018-08-15 ENCOUNTER — APPOINTMENT (OUTPATIENT)
Dept: LAB | Age: 43
End: 2018-08-15
Attending: FAMILY MEDICINE
Payer: COMMERCIAL

## 2018-08-15 DIAGNOSIS — IMO0001 UNCONTROLLED INSULIN DEPENDENT DIABETES MELLITUS: ICD-10-CM

## 2018-08-15 LAB
ALBUMIN SERPL-MCNC: 4.3 G/DL (ref 3.5–4.8)
ALBUMIN/GLOB SERPL: 1.2 {RATIO} (ref 1–2)
ALP LIVER SERPL-CCNC: 59 U/L (ref 45–117)
ALT SERPL-CCNC: 26 U/L (ref 17–63)
ANION GAP SERPL CALC-SCNC: 6 MMOL/L (ref 0–18)
AST SERPL-CCNC: 15 U/L (ref 15–41)
BILIRUB SERPL-MCNC: 0.6 MG/DL (ref 0.1–2)
BUN BLD-MCNC: 7 MG/DL (ref 8–20)
BUN/CREAT SERPL: 7.7 (ref 10–20)
CALCIUM BLD-MCNC: 9.1 MG/DL (ref 8.3–10.3)
CHLORIDE SERPL-SCNC: 106 MMOL/L (ref 101–111)
CHOLEST SMN-MCNC: 169 MG/DL (ref ?–200)
CO2 SERPL-SCNC: 29 MMOL/L (ref 22–32)
CREAT BLD-MCNC: 0.91 MG/DL (ref 0.7–1.3)
CREAT UR-SCNC: 373 MG/DL
EST. AVERAGE GLUCOSE BLD GHB EST-MCNC: 255 MG/DL (ref 68–126)
GLOBULIN PLAS-MCNC: 3.5 G/DL (ref 2.5–3.7)
GLUCOSE BLD-MCNC: 185 MG/DL (ref 70–99)
HBA1C MFR BLD HPLC: 10.5 % (ref ?–5.7)
HDLC SERPL-MCNC: 44 MG/DL (ref 40–59)
LDLC SERPL CALC-MCNC: 115 MG/DL (ref ?–100)
M PROTEIN MFR SERPL ELPH: 7.8 G/DL (ref 6.1–8.3)
MICROALBUMIN UR-MCNC: 20.8 MG/DL
MICROALBUMIN/CREAT 24H UR-RTO: 55.8 UG/MG (ref ?–30)
NONHDLC SERPL-MCNC: 125 MG/DL (ref ?–130)
OSMOLALITY SERPL CALC.SUM OF ELEC: 295 MOSM/KG (ref 275–295)
POTASSIUM SERPL-SCNC: 3.9 MMOL/L (ref 3.6–5.1)
SODIUM SERPL-SCNC: 141 MMOL/L (ref 136–144)
TRIGL SERPL-MCNC: 51 MG/DL (ref 30–149)
VLDLC SERPL CALC-MCNC: 10 MG/DL (ref 0–30)

## 2018-08-15 PROCEDURE — 82043 UR ALBUMIN QUANTITATIVE: CPT

## 2018-08-15 PROCEDURE — 83036 HEMOGLOBIN GLYCOSYLATED A1C: CPT

## 2018-08-15 PROCEDURE — 82570 ASSAY OF URINE CREATININE: CPT

## 2018-08-15 PROCEDURE — 36415 COLL VENOUS BLD VENIPUNCTURE: CPT

## 2018-08-15 PROCEDURE — 80061 LIPID PANEL: CPT

## 2018-08-15 PROCEDURE — 80053 COMPREHEN METABOLIC PANEL: CPT

## 2018-08-15 RX ORDER — LANCETS
EACH MISCELLANEOUS
Qty: 100 EACH | Refills: 3 | Status: SHIPPED | OUTPATIENT
Start: 2018-08-15 | End: 2019-09-17

## 2018-08-17 ENCOUNTER — TELEPHONE (OUTPATIENT)
Dept: FAMILY MEDICINE CLINIC | Facility: CLINIC | Age: 43
End: 2018-08-17

## 2018-08-17 DIAGNOSIS — IMO0001 UNCONTROLLED INSULIN DEPENDENT DIABETES MELLITUS: Primary | ICD-10-CM

## 2018-08-17 NOTE — TELEPHONE ENCOUNTER
----- Message from Kaila Tate MD sent at 8/17/2018  9:39 AM CDT -----  Improved, please see if he'd be ok seeing Dr. Debbie Quiñonez endocrine, will discuss further at next upcoming ov. Recheck labs in 3 months.

## 2018-08-17 NOTE — TELEPHONE ENCOUNTER
Discussed test results with patient. He is agreeable to seeing . His contact information was to patient's my chart per his request. 3 month lab orders placed.

## 2018-08-21 ENCOUNTER — TELEPHONE (OUTPATIENT)
Dept: FAMILY MEDICINE CLINIC | Facility: CLINIC | Age: 43
End: 2018-08-21

## 2018-08-21 NOTE — TELEPHONE ENCOUNTER
Called Pt in regards to his appt. No show no call. Pt forgot about today's appt. He already rescheduled the appt. Pt is aware that we will charge him the 25$ for no call no show, Pt showed understanding.

## 2018-08-23 ENCOUNTER — OFFICE VISIT (OUTPATIENT)
Dept: FAMILY MEDICINE CLINIC | Facility: CLINIC | Age: 43
End: 2018-08-23

## 2018-08-23 VITALS
SYSTOLIC BLOOD PRESSURE: 120 MMHG | HEIGHT: 71 IN | DIASTOLIC BLOOD PRESSURE: 84 MMHG | HEART RATE: 72 BPM | OXYGEN SATURATION: 98 % | WEIGHT: 200 LBS | BODY MASS INDEX: 28 KG/M2 | TEMPERATURE: 98 F | RESPIRATION RATE: 16 BRPM

## 2018-08-23 DIAGNOSIS — R53.81 MALAISE AND FATIGUE: Primary | ICD-10-CM

## 2018-08-23 DIAGNOSIS — R53.83 MALAISE AND FATIGUE: Primary | ICD-10-CM

## 2018-08-23 PROCEDURE — 99213 OFFICE O/P EST LOW 20 MIN: CPT | Performed by: NURSE PRACTITIONER

## 2018-08-23 RX ORDER — MONTELUKAST SODIUM 10 MG/1
TABLET ORAL
Qty: 90 TABLET | Refills: 0 | Status: SHIPPED | OUTPATIENT
Start: 2018-08-23 | End: 2020-04-21

## 2018-08-23 RX ORDER — AZITHROMYCIN 250 MG/1
TABLET, FILM COATED ORAL
Qty: 6 TABLET | Refills: 0 | Status: SHIPPED | OUTPATIENT
Start: 2018-08-23 | End: 2018-09-15 | Stop reason: ALTCHOICE

## 2018-08-23 NOTE — PATIENT INSTRUCTIONS
Viral Syndrome (Adult)  A viral illness may cause a number of symptoms such as fever. Other symptoms depend on the part of the body that the virus affects.  If it settles in your nose, throat, and lungs, it may cause cough, sore throat, congestion, runny · Your appetite may be poor, so a light diet is fine. Avoid dehydration by drinking 8 to 12, 8-ounce glasses of fluids each day.  This may include water; orange juice; lemonade; apple, grape, and cranberry juice; clear fruit drinks; electrolyte replacement © 2525-5971 [address] [facility], [facility] [place]. All rights reserved. This information is not intended as a substitute for professional medical care. Always follow your healthcare professional's instructions.

## 2018-08-23 NOTE — PROGRESS NOTES
Rachelle Crabtree is a 37year old male. HPI:   Patient presenting with fatigue x 1 day. No fevers. History of dry cough and scratchy throat early last week. This has improved. Today he feels tired and like he's \"coming down with something. \" Has been ta directed Disp: 100 each Rfl: 0   insulin glargine (LANTUS) 100 UNIT/ML Subcutaneous Solution Inject 25 Units into the skin nightly. Disp:  Rfl:    aspirin (ASPIR-81) 81 MG Oral Tab EC Take 1 tablet by mouth daily.  Disp: 1 tablet Rfl: 0   VALACYCLOVIR HCL 5 Supple. No thyromegaly noted. No cervical adenopathy. LUNGS: CTA bilaterally. No wheezing or rhonchi. CARDIO: RRR. No murmur noted. GI: Abdomen is soft and non-distended. Bowel sounds normoactive. No guarding or abdominal tenderness with palpation.  Jennifer Lu

## 2018-08-27 ENCOUNTER — OFFICE VISIT (OUTPATIENT)
Dept: FAMILY MEDICINE CLINIC | Facility: CLINIC | Age: 43
End: 2018-08-27

## 2018-08-27 VITALS
HEIGHT: 71 IN | DIASTOLIC BLOOD PRESSURE: 78 MMHG | RESPIRATION RATE: 16 BRPM | TEMPERATURE: 98 F | HEART RATE: 76 BPM | SYSTOLIC BLOOD PRESSURE: 122 MMHG | BODY MASS INDEX: 27.16 KG/M2 | WEIGHT: 194 LBS

## 2018-08-27 DIAGNOSIS — IMO0001 UNCONTROLLED INSULIN DEPENDENT DIABETES MELLITUS: Primary | ICD-10-CM

## 2018-08-27 DIAGNOSIS — Z91.14 NON COMPLIANCE W MEDICATION REGIMEN: ICD-10-CM

## 2018-08-27 DIAGNOSIS — E78.00 PURE HYPERCHOLESTEROLEMIA WITH TARGET LOW DENSITY LIPOPROTEIN (LDL) CHOLESTEROL LESS THAN 130 MG/DL: ICD-10-CM

## 2018-08-27 PROCEDURE — 99214 OFFICE O/P EST MOD 30 MIN: CPT | Performed by: FAMILY MEDICINE

## 2018-08-27 NOTE — PROGRESS NOTES
Patient presents with:  Diabetes      Antonella Aparicio is a 37year old year old who presents for recheck of diabetes. Pt has been checking feet on a regular basis. Pt denies any tingling of the feet. Pt denies any sx's of depression.   Pt complains of no - - - - - - -   Last Depression Screening Date - - - - - - - -   How was patient screened? - - - - - - - -   PHQ2 Date - - - - - - - -   PHQ2 Score - - - - - - - -   PHQ4 Score - - - - - - - -   [id number] - - - - - - - 3/13/2018   PHQ9 Score - - - - - - - 2 (ZITHROMAX Z-STACI) 250 MG Oral Tab, Take two tablets by mouth today, then one tablet daily. , Disp: 6 tablet, Rfl: 0  •  VALACYCLOVIR  MG Oral Tab, TAKE 1 TABLET(500 MG) BY MOUTH TWICE DAILY, Disp: 6 tablet, Rfl: 0     Last documented BP: 122/78 for color change and rash. Neurological: Negative for dizziness, syncope, weakness, numbness, tingling and headaches. Hematological: Negative for adenopathy. Does not bruise/bleed easily. Psychiatric/Behavioral: The patient is not nervous/anxious.  No the skin nightly. Disp:  Rfl:    aspirin (ASPIR-81) 81 MG Oral Tab EC Take 1 tablet by mouth daily. Disp: 1 tablet Rfl: 0   azithromycin (ZITHROMAX Z-STACI) 250 MG Oral Tab Take two tablets by mouth today, then one tablet daily.  Disp: 6 tablet Rfl: 0   VALAC normal, no pedal edema, no clubbing or cyanosis, feet normal, good pulses, normal color, temperature and sensation, monofilament sensory exam is normal in both feet, no edema, redness or tenderness in the calves or thighs    Skin: Skin is warm and dry.  No

## 2018-09-15 ENCOUNTER — OFFICE VISIT (OUTPATIENT)
Dept: FAMILY MEDICINE CLINIC | Facility: CLINIC | Age: 43
End: 2018-09-15

## 2018-09-15 VITALS
HEART RATE: 88 BPM | BODY MASS INDEX: 27.02 KG/M2 | TEMPERATURE: 98 F | WEIGHT: 193 LBS | HEIGHT: 71 IN | SYSTOLIC BLOOD PRESSURE: 132 MMHG | RESPIRATION RATE: 12 BRPM | DIASTOLIC BLOOD PRESSURE: 84 MMHG | OXYGEN SATURATION: 98 %

## 2018-09-15 DIAGNOSIS — H00.012 HORDEOLUM EXTERNUM OF RIGHT LOWER EYELID: ICD-10-CM

## 2018-09-15 DIAGNOSIS — J30.2 SEASONAL ALLERGIES: Primary | ICD-10-CM

## 2018-09-15 PROCEDURE — 99213 OFFICE O/P EST LOW 20 MIN: CPT | Performed by: NURSE PRACTITIONER

## 2018-09-15 RX ORDER — ERYTHROMYCIN 5 MG/G
1 OINTMENT OPHTHALMIC 4 TIMES DAILY
Qty: 3.5 G | Refills: 0 | Status: SHIPPED | OUTPATIENT
Start: 2018-09-15 | End: 2018-09-22

## 2018-09-15 NOTE — PROGRESS NOTES
CHIEF COMPLAINT:   Patient presents with:  Cough: x 1week       HPI:   Elina Glass is a 37year old male who presents for upper respiratory symptoms for  1 weeks. Patient reports congestion, dry cough, sore throat, denies fever, denies sinus pain.  Sy Insulin Pen Needle (BD PEN NEEDLE MINI U/F) 31G X 5 MM Does not apply Misc Use daily with Lantus as directed Disp: 100 each Rfl: 0   Insulin Syringe 31G X 5/16\" 1 ML Does not apply Misc Use as directed Disp: 100 each Rfl: 0   insulin glargine (LANTUS) 100 EXTREMITIES: no cyanosis, clubbing or edema  LYMPH:  no lymphadenopathy.         ASSESSMENT AND PLAN:   Rosy Mo is a 37year old male who presents with upper respiratory symptoms that are consistent with    ASSESSMENT:   Seasonal allergies  (prima · Keep your car clean. Vacuum the seats and carpets regularly. If you have air conditioning, use it instead of opening the windows. · Keep rain gutters clean. Remove leaves and debris that can grow mold. · Check stored food for spoilage and mold growth. ? Yulia Marten and doors closed. Use air conditioning instead in your home and car. This filters the air. ? Change air conditioner filters often. ? Take a shower, wash your hair, and change clothes after being outdoors.   ? Put on a NIOSH-rated 95 filter · If you have sinus congestion or drainage, a saline nasal rinse may give relief. A saline nasal rinse lessens the swelling and clears excess mucus. This allows sinuses to drain. Prepackaged kits are sold at most drug stores.  These contain pre-mixed salt p · Eye drops or ointment are usually prescribed to treat the infection. Use these as directed.   · Artificial tears may also be used to lubricate the eye and make it more comfortable. You can buy these over the counter without a prescription.  Talk with your

## 2018-09-15 NOTE — PATIENT INSTRUCTIONS
Controlling Allergens: In the Home  Even a clean home can be full of allergens, so take a moment to see what you can do to cut down on allergens in each room of your home. Try to avoid things like cigarette smoke and perfume.  They can irritate your eyes, © 4450-5968 [address]. [address] [facility], [facility] [place]. All rights reserved. This information is not intended as a substitute for professional medical care. Always follow your healthcare professional's instructions.         Laquita Clements · Antihistamines block the release of histamine during the allergic response. They work better when taken before symptoms develop.  Unless a prescription antihistamine was prescribed, you can take over-the-counter antihistamines that do not cause drowsiness · Rapid heart rate, or weak pulse  · Low blood pressure  · Feeling of doom  · Nausea, vomiting, abdominal pain, diarrhea  · Vomiting blood, or large amounts of blood in stool  · Seizure  · Cold, moist, or pale (blue in color) skin  Date Last Reviewed: 5/1/ · Increase in swelling or redness around the eyelid after 48 to 72 hours  · Increase in eye pain or the eyelid blisters  · Increase in warmth—the eyelid feels hot  · Drainage of blood or thick pus from the sty  · Blister on the eyelid  · Inability to open

## 2018-09-19 ENCOUNTER — OFFICE VISIT (OUTPATIENT)
Dept: FAMILY MEDICINE CLINIC | Facility: CLINIC | Age: 43
End: 2018-09-19

## 2018-09-19 VITALS
WEIGHT: 195 LBS | HEIGHT: 71 IN | OXYGEN SATURATION: 98 % | BODY MASS INDEX: 27.3 KG/M2 | HEART RATE: 76 BPM | SYSTOLIC BLOOD PRESSURE: 122 MMHG | TEMPERATURE: 98 F | RESPIRATION RATE: 16 BRPM | DIASTOLIC BLOOD PRESSURE: 80 MMHG

## 2018-09-19 DIAGNOSIS — R05.8 DRY COUGH: Primary | ICD-10-CM

## 2018-09-19 DIAGNOSIS — J30.2 SEASONAL ALLERGIES: ICD-10-CM

## 2018-09-19 PROCEDURE — 99213 OFFICE O/P EST LOW 20 MIN: CPT | Performed by: NURSE PRACTITIONER

## 2018-09-19 RX ORDER — BENZONATATE 100 MG/1
200 CAPSULE ORAL 3 TIMES DAILY PRN
Qty: 30 CAPSULE | Refills: 0 | Status: SHIPPED | OUTPATIENT
Start: 2018-09-19 | End: 2019-01-04

## 2018-09-19 RX ORDER — AZITHROMYCIN 250 MG/1
TABLET, FILM COATED ORAL
Qty: 6 TABLET | Refills: 0 | Status: SHIPPED | OUTPATIENT
Start: 2018-09-19 | End: 2019-01-04

## 2018-09-19 NOTE — PATIENT INSTRUCTIONS
Seasonal Allergy  Seasonal allergy is also called hay fever. It may occur after a person is exposed to pollens released from grasses, weeds, trees and shrubs.  This type of allergy occurs during the spring and summer when the pollen contacts the lining of · Steroid nasal sprays or oral steroids may also be prescribed for more severe symptoms. These help to reduce the local inflammation that can add to the allergic response. · If you have asthma, pollen season may make your asthma symptoms worse.  It is impo © 2360-4940 [address]. [address] [facility] [place], [address] [place]. All rights reserved. This information is not intended as a substitute for professional medical care. Always follow your healthcare professional's instructions.     Follow up i

## 2018-09-19 NOTE — PROGRESS NOTES
HPI:    Patient ID: Nick Maki is a 37year old male. Patient here to f/u for cough. Symptoms have been going on for about 1 week. Was seen in [facility] on Saturday and told symptoms likely d/t allergies.  He did report feeling generalized fatigue/weakn Disp: 2 pen Rfl: 0   Montelukast Sodium 10 MG Oral Tab TAKE 1 TABLET(10 MG) BY MOUTH DAILY Disp: 90 tablet Rfl: 0   METFORMIN HCL 1000 MG Oral Tab TAKE 1 TABLET BY MOUTH TWICE DAILY WITH MEALS Disp: 180 tablet Rfl: 0   MICROLET LANCETS Does not apply Misc normal. No oropharyngeal exudate or posterior oropharyngeal edema. +PND noted    Eyes: Conjunctivae and EOM are normal. Pupils are equal, round, and reactive to light. Neck: Normal range of motion. Neck supple. No thyromegaly present.    Cardiovascular:

## 2018-10-04 DIAGNOSIS — IMO0001 UNCONTROLLED INSULIN DEPENDENT DIABETES MELLITUS: ICD-10-CM

## 2018-10-04 NOTE — TELEPHONE ENCOUNTER
Medication(s) to Refill:   Requested Prescriptions     Pending Prescriptions Disp Refills   • LANTUS SOLOSTAR 100 UNIT/ML Subcutaneous Solution Pen-injector [Pharmacy Med Name: LANTUS SOLOSTAR PEN INJ 3ML] 90 mL 0     Sig: INJECT 100 UNITS UNDER THE SKIN N

## 2018-10-20 RX ORDER — ATORVASTATIN CALCIUM 10 MG/1
TABLET, FILM COATED ORAL
Qty: 90 TABLET | Refills: 0 | Status: SHIPPED | OUTPATIENT
Start: 2018-10-20 | End: 2018-12-15

## 2018-10-30 RX ORDER — LOSARTAN POTASSIUM 50 MG/1
TABLET ORAL
Qty: 90 TABLET | Refills: 0 | Status: SHIPPED | OUTPATIENT
Start: 2018-10-30 | End: 2019-01-29

## 2018-10-30 NOTE — TELEPHONE ENCOUNTER
Medication(s) to Refill:   Requested Prescriptions     Pending Prescriptions Disp Refills   • LOSARTAN POTASSIUM 50 MG Oral Tab [Pharmacy Med Name: LOSARTAN 50MG TABLETS] 90 tablet 0     Sig: TAKE 1 TABLET(50 MG) BY MOUTH EVERY DAY             Last Time Me

## 2018-11-13 ENCOUNTER — PATIENT OUTREACH (OUTPATIENT)
Dept: FAMILY MEDICINE CLINIC | Facility: CLINIC | Age: 43
End: 2018-11-13

## 2018-11-24 NOTE — TELEPHONE ENCOUNTER
Medication(s) to Refill:   Requested Prescriptions     Pending Prescriptions Disp Refills   • METFORMIN HCL 1000 MG Oral Tab [Pharmacy Med Name: METFORMIN 1000MG TABLETS] 180 tablet 0     Sig: TAKE 1 TABLET BY MOUTH TWICE DAILY WITH MEALS         Reason fo

## 2018-11-27 ENCOUNTER — PATIENT OUTREACH (OUTPATIENT)
Dept: FAMILY MEDICINE CLINIC | Facility: CLINIC | Age: 43
End: 2018-11-27

## 2018-12-15 ENCOUNTER — PATIENT OUTREACH (OUTPATIENT)
Dept: FAMILY MEDICINE CLINIC | Facility: CLINIC | Age: 43
End: 2018-12-15

## 2018-12-17 RX ORDER — ATORVASTATIN CALCIUM 10 MG/1
TABLET, FILM COATED ORAL
Qty: 90 TABLET | Refills: 0 | Status: SHIPPED | OUTPATIENT
Start: 2018-12-17 | End: 2019-06-07

## 2019-01-04 ENCOUNTER — OFFICE VISIT (OUTPATIENT)
Dept: FAMILY MEDICINE CLINIC | Facility: CLINIC | Age: 44
End: 2019-01-04

## 2019-01-04 VITALS
HEART RATE: 96 BPM | DIASTOLIC BLOOD PRESSURE: 78 MMHG | RESPIRATION RATE: 16 BRPM | WEIGHT: 192 LBS | TEMPERATURE: 98 F | BODY MASS INDEX: 26.88 KG/M2 | SYSTOLIC BLOOD PRESSURE: 136 MMHG | HEIGHT: 71 IN

## 2019-01-04 DIAGNOSIS — J30.2 SEASONAL ALLERGIES: ICD-10-CM

## 2019-01-04 DIAGNOSIS — S49.92XA INJURY OF LEFT UPPER EXTREMITY, INITIAL ENCOUNTER: ICD-10-CM

## 2019-01-04 DIAGNOSIS — M79.602 LEFT ARM PAIN: ICD-10-CM

## 2019-01-04 DIAGNOSIS — IMO0001 UNCONTROLLED INSULIN DEPENDENT DIABETES MELLITUS: Primary | ICD-10-CM

## 2019-01-04 DIAGNOSIS — E78.00 PURE HYPERCHOLESTEROLEMIA WITH TARGET LOW DENSITY LIPOPROTEIN (LDL) CHOLESTEROL LESS THAN 130 MG/DL: ICD-10-CM

## 2019-01-04 PROCEDURE — 99215 OFFICE O/P EST HI 40 MIN: CPT | Performed by: FAMILY MEDICINE

## 2019-01-06 NOTE — PROGRESS NOTES
No chief complaint on file. Home Brantley is a 37year old year old who presents for recheck of diabetes. Pt has been checking feet on a regular basis. Pt denies any tingling of the feet. Pt denies any sx's of depression.     Patient's eye exam: U 194 lb 200 lb -   BP (enc vitals) - 136/78 - 122/80 132/84 122/78 120/84 -   Last Foot Exam - - - - - - - -   Last Eye Exam - - - - - - - -   Eye Doctor Name - - - - - - - -   Eye Exam Location - - - - - - - -   Last Depression Screening Date - - - - - - - apply Misc, Use as directed, Disp: 100 each, Rfl: 0     Last documented BP: 136/78    HEMOGLOBIN A1C (%)   Date Value   12/12/2016 8.5 (A)   09/26/2016 8.0 (A)   10/20/2015 11.4 (A)     HgbA1C (%)   Date Value   08/15/2018 10.5 (H)   11/14/2017 11.5 (H) nervous/anxious. No depression.     Patient Active Problem List:     Uncontrolled insulin dependent diabetes mellitus (HCC)     Non compliance w medication regimen     Pure hypercholesterolemia with target low density lipoprotein (LDL) cholesterol less than uncontrolled      Past Surgical History:   Procedure Laterality Date   • APPENDECTOMY  10 yrs ago     Family History   Problem Relation Age of Onset   • Diabetes Father    • Diabetes Mother      Social History    Tobacco Use      Smoking status: Never Smok control, restart lantus at 25 units q daily  Pure hypercholesterolemia with target low density lipoprotein (ldl) cholesterol less than 130 mg/dl  Seasonal allergies  -stable, CPM  Left arm pain  Injury of left upper extremity, initial encounter  -will [facility]

## 2019-01-10 ENCOUNTER — OFFICE VISIT (OUTPATIENT)
Dept: FAMILY MEDICINE CLINIC | Facility: CLINIC | Age: 44
End: 2019-01-10

## 2019-01-10 ENCOUNTER — HOSPITAL ENCOUNTER (OUTPATIENT)
Dept: GENERAL RADIOLOGY | Age: 44
Discharge: HOME | End: 2019-01-10
Attending: FAMILY MEDICINE
Payer: COMMERCIAL

## 2019-01-10 VITALS
HEART RATE: 84 BPM | WEIGHT: 191 LBS | TEMPERATURE: 98 F | BODY MASS INDEX: 26.74 KG/M2 | RESPIRATION RATE: 16 BRPM | SYSTOLIC BLOOD PRESSURE: 118 MMHG | DIASTOLIC BLOOD PRESSURE: 76 MMHG | OXYGEN SATURATION: 99 % | HEIGHT: 71 IN

## 2019-01-10 DIAGNOSIS — M79.602 LEFT ARM PAIN: ICD-10-CM

## 2019-01-10 DIAGNOSIS — J02.9 PHARYNGITIS, UNSPECIFIED ETIOLOGY: ICD-10-CM

## 2019-01-10 DIAGNOSIS — IMO0001 UNCONTROLLED INSULIN DEPENDENT DIABETES MELLITUS: Primary | ICD-10-CM

## 2019-01-10 LAB
CARTRIDGE LOT#: 904 NUMERIC
CONTROL LINE PRESENT WITH A CLEAR BACKGROUND (YES/NO): YES YES/NO
CONTROL LINE PRESENT WITH A CLEAR BACKGROUND (YES/NO): YES YES/NO
HEMOGLOBIN A1C: 10.6 % (ref 4.3–5.6)

## 2019-01-10 PROCEDURE — 87880 STREP A ASSAY W/OPTIC: CPT | Performed by: NURSE PRACTITIONER

## 2019-01-10 PROCEDURE — 86308 HETEROPHILE ANTIBODY SCREEN: CPT | Performed by: NURSE PRACTITIONER

## 2019-01-10 PROCEDURE — 99214 OFFICE O/P EST MOD 30 MIN: CPT | Performed by: NURSE PRACTITIONER

## 2019-01-10 PROCEDURE — 87081 CULTURE SCREEN ONLY: CPT | Performed by: NURSE PRACTITIONER

## 2019-01-10 PROCEDURE — 73060 X-RAY EXAM OF HUMERUS: CPT | Performed by: FAMILY MEDICINE

## 2019-01-10 PROCEDURE — 83036 HEMOGLOBIN GLYCOSYLATED A1C: CPT | Performed by: NURSE PRACTITIONER

## 2019-01-10 NOTE — PATIENT INSTRUCTIONS
Managing Type 2 Diabetes    Type 2 diabetes is a long-term (chronic) condition. Managing your diabetes means making some changes that may be hard. Your healthcare provider, nurse, diabetes educator, and others can help you.   Managing type 2 diabetes mean Ask your health care provider to work with you to create an activity program that's right for you. Your activity program is based on your age, general health, and types of activity you enjoy.  You should start slowly, but aim for at least 150 minutes of exe ? At least two times a year, your healthcare provider will check your hemoglobin A1C. This blood test shows how well you have been controlling your blood sugar over 2 to 3 months.  The results help your healthcare provider manage your diabetes.    ? You phyllis A car needs the right type of fuel to run. And you need the right kind of food to function. To keep your energy level up, your body needs food that has carbohydrates. But carbohydrates raise blood sugar levels higher and faster than other kinds of food.  Jacklyn Bond Keep track of the amount of carbohydrates you eat. This can help you keep the right balance of physical activity and medicine. The amount of carbohydrates needed will vary for each person.  It depends on many things such as your health, the medicines you ta · Check the serving size. The information on the label is based on that serving size. If you eat more than the listed serving size, you may have to double or triple the other information on the label.   · Check the total grams of carbohydrates.  Total Lise Food is an important tool that you can use to control diabetes and stay healthy. Eating well-balanced meals in the correct amounts will help you control your blood glucose levels and prevent low blood sugar reactions.  It will also help you reduce the healt · Avoid added salt. It can contribute to high blood pressure, which can cause heart disease. People with diabetes already have a risk of high blood pressure and heart disease. · Stay at a healthy weight.  If you need to lose weight, cut down on your portio · Grains. All foods made from grains are part of the Grains Group. These include wheat, rice, oats, cornmeal, and barley such as bread, pasta, oatmeal, cereal, tortillas, and grits. Grains should be no more than a quarter of your plate.  At least half of yo Pharyngitis (Sore Throat), Report Pending    Pharyngitis (sore throat) is often due to a virus. It can also be caused by streptococcus (strep), bacteria. This is often called strep throat.  Both viral and strep infections can cause throat pain that is worse · Use throat lozenges or numbing throat sprays to help reduce pain. Gargling with warm salt water will also help reduce throat pain. Dissolve 1/2 teaspoon of salt in 1 glass of warm water. Children can sip on juice or a popsicle.  Children 5 years and older · •Can’t swallow liquids, a lot of drooling, or can’t open mouth wide due to throat pain  · Signs of dehydration, such as very dark urine or no urine, sunken eyes, dizziness  · Trouble breathing or noisy breathing  · Muffled voice  · New rash  · Other symp

## 2019-01-10 NOTE — PROGRESS NOTES
Chief Complaint:   Patient presents with:  Sore Throat: x 3 days    HPI:   This is a 37year old male presenting for evaluation of sore throat and headache x 3 days. Hurts to swallow. Reportedly felt scratchy a few days ago, but has gotten worse.  No cough, ATORVASTATIN 10 MG Oral Tab TAKE 1 TABLET(10 MG) BY MOUTH EVERY NIGHT Disp: 90 tablet Rfl: 0   METFORMIN HCL 1000 MG Oral Tab TAKE 1 TABLET BY MOUTH TWICE DAILY WITH MEALS Disp: 180 tablet Rfl: 0   LOSARTAN POTASSIUM 50 MG Oral Tab TAKE 1 TABLET(50 MG) B hematuria and nocturia. Musculoskeletal: Negative for back pain, joint swelling and joint pain. Skin: Negative for pallor, rash and wound. Neurological: Negative for dizziness, weakness, light-headedness and headaches.    Hematological: Negative for a normal. Judgment and thought content normal.      ASSESSMENT AND PLAN:   1. Uncontrolled insulin dependent diabetes mellitus (HCC)  -Fingerstick A1C at 10.6.  Slightly worse than last A1C in August 2018.  -Continue current medication regimen.  -Start checki

## 2019-01-14 ENCOUNTER — TELEPHONE (OUTPATIENT)
Dept: FAMILY MEDICINE CLINIC | Facility: CLINIC | Age: 44
End: 2019-01-14

## 2019-01-17 ENCOUNTER — TELEPHONE (OUTPATIENT)
Dept: FAMILY MEDICINE CLINIC | Facility: CLINIC | Age: 44
End: 2019-01-17

## 2019-01-17 DIAGNOSIS — M79.602 LEFT ARM PAIN: Primary | ICD-10-CM

## 2019-01-17 NOTE — TELEPHONE ENCOUNTER
Written by CONNOR Cox, FNP-C on 1/10/2019  2:21 PM   [facility],     I just got your x-ray results back. The x-ray looks okay- no evidence of any acute abnormalities. No fracture or dislocation.      If you're still having pain in your arm, try rest

## 2019-01-18 NOTE — TELEPHONE ENCOUNTER
Spoke to pt regarding Left humerus x-ray - he verbalizes understanding. He still c/o of upper arm pain and it has not gotten better. It's been over a month of this. The pain is worse when he lifts his arm over his head. Denies swelling to area.  He has

## 2019-01-21 ENCOUNTER — MED REC SCAN ONLY (OUTPATIENT)
Dept: FAMILY MEDICINE CLINIC | Facility: CLINIC | Age: 44
End: 2019-01-21

## 2019-01-21 NOTE — TELEPHONE ENCOUNTER
Called patient and spoke with him. Advised him of POC below. Patient states understanding. Patient is requesting PT information be sent to his My Chart. Order placed in [facility] Patient provided PT information via My Chart as requested.

## 2019-01-30 RX ORDER — LOSARTAN POTASSIUM 50 MG/1
TABLET ORAL
Qty: 90 TABLET | Refills: 0 | Status: SHIPPED | OUTPATIENT
Start: 2019-01-30 | End: 2019-04-17

## 2019-02-08 ENCOUNTER — OFFICE VISIT (OUTPATIENT)
Dept: FAMILY MEDICINE CLINIC | Facility: CLINIC | Age: 44
End: 2019-02-08

## 2019-02-08 VITALS
BODY MASS INDEX: 27.16 KG/M2 | RESPIRATION RATE: 16 BRPM | OXYGEN SATURATION: 98 % | HEART RATE: 90 BPM | HEIGHT: 71 IN | SYSTOLIC BLOOD PRESSURE: 130 MMHG | TEMPERATURE: 98 F | WEIGHT: 194 LBS | DIASTOLIC BLOOD PRESSURE: 84 MMHG

## 2019-02-08 DIAGNOSIS — IMO0001 UNCONTROLLED INSULIN DEPENDENT DIABETES MELLITUS: ICD-10-CM

## 2019-02-08 DIAGNOSIS — J02.9 ACUTE PHARYNGITIS, UNSPECIFIED ETIOLOGY: ICD-10-CM

## 2019-02-08 DIAGNOSIS — J02.9 SORE THROAT: Primary | ICD-10-CM

## 2019-02-08 LAB
CONTROL LINE PRESENT WITH A CLEAR BACKGROUND (YES/NO): YES YES/NO
STREP GRP A CUL-SCR: NEGATIVE

## 2019-02-08 PROCEDURE — 99214 OFFICE O/P EST MOD 30 MIN: CPT | Performed by: FAMILY MEDICINE

## 2019-02-08 PROCEDURE — 87880 STREP A ASSAY W/OPTIC: CPT | Performed by: FAMILY MEDICINE

## 2019-02-08 RX ORDER — AZITHROMYCIN 250 MG/1
TABLET, FILM COATED ORAL
Qty: 6 TABLET | Refills: 0 | Status: SHIPPED | OUTPATIENT
Start: 2019-02-08 | End: 2019-09-17

## 2019-02-10 NOTE — PROGRESS NOTES
Patient presents with:  Sore Throat: x 1 week      Nick Maki is a 37year old year old who presents for recheck of diabetes. Pt has been checking feet on a regular basis. Pt denies any tingling of the feet.  Pt denies any sx's of depression and rep 194 lb - - 191 lb 192 lb - 195 lb   BP (enc vitals) - 130/84 - - 118/76 136/78 - 122/80   Last Foot Exam - - - - - - - -   Last Eye Exam - - - 1/18/2019 - - - -   Eye Doctor Name - - - - - - - -   Eye Exam Location - - - - - - - -   Eye Exam Retinopathy - Subcutaneous Solution, Inject 25 Units into the skin nightly., Disp: , Rfl:   •  ATORVASTATIN 10 MG Oral Tab, TAKE 1 TABLET(10 MG) BY MOUTH EVERY NIGHT, Disp: 90 tablet, Rfl: 0  •  Montelukast Sodium 10 MG Oral Tab, TAKE 1 TABLET(10 MG) BY MOUTH DAILY, Dis Negative for dysuria, hematuria and difficulty urinating. Musculoskeletal: Negative for myalgias, back pain, joint swelling, arthralgias and gait problem. Skin: Negative for color change and rash.    Neurological: Negative for dizziness, syncope, weakne 300 strip Rfl: 0   Insulin Syringe 31G X 5/16\" 1 ML Does not apply Misc Use as directed Disp: 100 each Rfl: 0   insulin glargine (LANTUS) 100 UNIT/ML Subcutaneous Solution Inject 25 Units into the skin nightly.  Disp:  Rfl:    ATORVASTATIN 10 MG Oral Tab T or hernias. Musculoskeletal: Normal range of motion. No edema and no tenderness. No effusions. Lymphadenopathy: No cervical adenopathy. Neurological: Normal reflexes. No cranial nerve deficit or sensory deficit. Nnormal muscle tone.  Coordination maya

## 2019-03-08 RX ORDER — MONTELUKAST SODIUM 10 MG/1
TABLET ORAL
Qty: 90 TABLET | Refills: 0 | Status: SHIPPED | OUTPATIENT
Start: 2019-03-08 | End: 2019-06-07

## 2019-04-16 ENCOUNTER — PATIENT OUTREACH (OUTPATIENT)
Dept: FAMILY MEDICINE CLINIC | Facility: CLINIC | Age: 44
End: 2019-04-16

## 2019-04-16 DIAGNOSIS — IMO0001 UNCONTROLLED TYPE 2 DIABETES MELLITUS WITHOUT COMPLICATION, WITHOUT LONG-TERM CURRENT USE OF INSULIN: Primary | ICD-10-CM

## 2019-04-17 RX ORDER — LOSARTAN POTASSIUM 50 MG/1
TABLET ORAL
Qty: 90 TABLET | Refills: 0 | Status: SHIPPED | OUTPATIENT
Start: 2019-04-17 | End: 2019-07-16

## 2019-05-14 ENCOUNTER — PATIENT OUTREACH (OUTPATIENT)
Dept: FAMILY MEDICINE CLINIC | Facility: CLINIC | Age: 44
End: 2019-05-14

## 2019-05-20 ENCOUNTER — TELEPHONE (OUTPATIENT)
Dept: FAMILY MEDICINE CLINIC | Facility: CLINIC | Age: 44
End: 2019-05-20

## 2019-05-20 NOTE — TELEPHONE ENCOUNTER
Attempted to contact patient to schedule DM f/u and to remind him he is due for labs (orders already in system). Unable to leave voicemail as mailbox is full. Will julianna for f/u and attempt to reach patient again tomorrow morning. PSR/Triage:  If pat

## 2019-05-25 NOTE — TELEPHONE ENCOUNTER
2nd attempt to contact patient regarding DM visit/labs. Unable to leave voicemail as mailbox is still full.

## 2019-06-07 RX ORDER — ATORVASTATIN CALCIUM 10 MG/1
TABLET, FILM COATED ORAL
Qty: 90 TABLET | Refills: 0 | Status: SHIPPED | OUTPATIENT
Start: 2019-06-07 | End: 2020-03-05

## 2019-06-07 RX ORDER — MONTELUKAST SODIUM 10 MG/1
TABLET ORAL
Qty: 90 TABLET | Refills: 0 | Status: SHIPPED | OUTPATIENT
Start: 2019-06-07 | End: 2019-09-05

## 2019-06-10 ENCOUNTER — PATIENT OUTREACH (OUTPATIENT)
Dept: FAMILY MEDICINE CLINIC | Facility: CLINIC | Age: 44
End: 2019-06-10

## 2019-06-10 NOTE — PROGRESS NOTES
Called patient regarding his DM labs patient states he will get them done soon on saturday when he has time. I expressed the importance of keeping up with DM labs especially since he is not controlled.

## 2019-06-24 NOTE — TELEPHONE ENCOUNTER
Medication(s) to Refill:   Requested Prescriptions     Pending Prescriptions Disp Refills   • METFORMIN HCL 1000 MG Oral Tab [Pharmacy Med Name: METFORMIN 1000MG TABLETS] 180 tablet 0     Sig: TAKE 1 TABLET BY MOUTH TWICE DAILY         Reason for Medicatio

## 2019-07-10 ENCOUNTER — TELEPHONE (OUTPATIENT)
Dept: FAMILY MEDICINE CLINIC | Facility: CLINIC | Age: 44
End: 2019-07-10

## 2019-07-10 NOTE — TELEPHONE ENCOUNTER
Spoke with pt and he states he is coming in this week to complete his labs, but if he doesn't make it this week he will becoming in for sure next week on his day off to complete his labs.

## 2019-07-16 RX ORDER — LOSARTAN POTASSIUM 50 MG/1
TABLET ORAL
Qty: 90 TABLET | Refills: 0 | Status: SHIPPED | OUTPATIENT
Start: 2019-07-16 | End: 2019-09-17

## 2019-07-16 NOTE — TELEPHONE ENCOUNTER
Medication(s) to Refill:   Requested Prescriptions     Pending Prescriptions Disp Refills   • LOSARTAN POTASSIUM 50 MG Oral Tab [Pharmacy Med Name: LOSARTAN 50MG TABLETS] 90 tablet 0     Sig: TAKE 1 TABLET(50 MG) BY MOUTH EVERY DAY         Last Time Medica

## 2019-08-24 ENCOUNTER — TELEPHONE (OUTPATIENT)
Dept: FAMILY MEDICINE CLINIC | Facility: CLINIC | Age: 44
End: 2019-08-24

## 2019-08-24 DIAGNOSIS — Z12.5 ENCOUNTER FOR SCREENING FOR MALIGNANT NEOPLASM OF PROSTATE: ICD-10-CM

## 2019-08-24 DIAGNOSIS — E78.00 PURE HYPERCHOLESTEROLEMIA WITH TARGET LOW DENSITY LIPOPROTEIN (LDL) CHOLESTEROL LESS THAN 130 MG/DL: ICD-10-CM

## 2019-08-24 DIAGNOSIS — IMO0001 UNCONTROLLED INSULIN DEPENDENT DIABETES MELLITUS: Primary | ICD-10-CM

## 2019-08-24 DIAGNOSIS — Z13.29 SCREENING FOR THYROID DISORDER: ICD-10-CM

## 2019-08-24 DIAGNOSIS — Z13.0 SCREENING FOR IRON DEFICIENCY ANEMIA: ICD-10-CM

## 2019-08-26 NOTE — TELEPHONE ENCOUNTER
Medication(s) to Refill:   Requested Prescriptions     Pending Prescriptions Disp Refills   • METFORMIN HCL 1000 MG Oral Tab [Pharmacy Med Name: METFORMIN 1000MG TABLETS] 120 tablet 0     Sig: TAKE 1 TABLET BY MOUTH TWICE DAILY         Reason for Medicatio

## 2019-08-26 NOTE — TELEPHONE ENCOUNTER
Spoke with patient is scheduled for 9/9/19. Patient said he will have his lab work done before his visit.

## 2019-08-30 NOTE — TELEPHONE ENCOUNTER
Patient called to check on the status of his prescription refill, he is scheduled for OV on 9/9 and will have his labs done prior to it. Pt is completely out of his medication.

## 2019-08-30 NOTE — TELEPHONE ENCOUNTER
Patient scheduled OV for 9/9-requesting refill of Metformin as he is completely out. Please approve or deny, thanks.

## 2019-09-03 ENCOUNTER — TELEPHONE (OUTPATIENT)
Dept: FAMILY MEDICINE CLINIC | Facility: CLINIC | Age: 44
End: 2019-09-03

## 2019-09-03 NOTE — TELEPHONE ENCOUNTER
Rx sent 6/24/19 for 3 month supply. Called [facility] and spoke with [facility]. She advised that Rx from 6/24/19 was not received. She states that patient used an old refill on 7/22/19 for 1 month only. Patient is completely out of medication.   Patient is sc

## 2019-09-03 NOTE — TELEPHONE ENCOUNTER
Patient called regarding refill of metformin, he states he know he needs to do bloodwork (he will this Friday) and he has an appt on Monday 9/9 with Dr. Sarah Gonzalez. He would like to speak with a nurse regarding this. Thank you.

## 2019-09-06 ENCOUNTER — LAB ENCOUNTER (OUTPATIENT)
Dept: LAB | Age: 44
End: 2019-09-06
Attending: FAMILY MEDICINE
Payer: COMMERCIAL

## 2019-09-06 DIAGNOSIS — IMO0001 UNCONTROLLED INSULIN DEPENDENT DIABETES MELLITUS: ICD-10-CM

## 2019-09-06 DIAGNOSIS — Z13.0 SCREENING FOR IRON DEFICIENCY ANEMIA: ICD-10-CM

## 2019-09-06 DIAGNOSIS — IMO0001 UNCONTROLLED TYPE 2 DIABETES MELLITUS WITHOUT COMPLICATION, WITHOUT LONG-TERM CURRENT USE OF INSULIN: ICD-10-CM

## 2019-09-06 DIAGNOSIS — Z13.29 SCREENING FOR THYROID DISORDER: ICD-10-CM

## 2019-09-06 DIAGNOSIS — Z12.5 ENCOUNTER FOR SCREENING FOR MALIGNANT NEOPLASM OF PROSTATE: ICD-10-CM

## 2019-09-06 DIAGNOSIS — E78.00 PURE HYPERCHOLESTEROLEMIA WITH TARGET LOW DENSITY LIPOPROTEIN (LDL) CHOLESTEROL LESS THAN 130 MG/DL: ICD-10-CM

## 2019-09-06 LAB
ALBUMIN SERPL-MCNC: 4.2 G/DL (ref 3.4–5)
ALBUMIN/GLOB SERPL: 1.1 {RATIO} (ref 1–2)
ALP LIVER SERPL-CCNC: 71 U/L (ref 45–117)
ALT SERPL-CCNC: 26 U/L (ref 16–61)
ANION GAP SERPL CALC-SCNC: 6 MMOL/L (ref 0–18)
AST SERPL-CCNC: 13 U/L (ref 15–37)
BASOPHILS # BLD AUTO: 0.04 X10(3) UL (ref 0–0.2)
BASOPHILS NFR BLD AUTO: 0.5 %
BILIRUB SERPL-MCNC: 0.7 MG/DL (ref 0.1–2)
BUN BLD-MCNC: 13 MG/DL (ref 7–18)
BUN/CREAT SERPL: 13.4 (ref 10–20)
CALCIUM BLD-MCNC: 8.8 MG/DL (ref 8.5–10.1)
CHLORIDE SERPL-SCNC: 101 MMOL/L (ref 98–112)
CHOLEST SMN-MCNC: 285 MG/DL (ref ?–200)
CO2 SERPL-SCNC: 30 MMOL/L (ref 21–32)
COMPLEXED PSA SERPL-MCNC: 0.34 NG/ML (ref ?–4)
CREAT BLD-MCNC: 0.97 MG/DL (ref 0.7–1.3)
CREAT UR-SCNC: 290 MG/DL
DEPRECATED RDW RBC AUTO: 38 FL (ref 35.1–46.3)
EOSINOPHIL # BLD AUTO: 0.12 X10(3) UL (ref 0–0.7)
EOSINOPHIL NFR BLD AUTO: 1.6 %
ERYTHROCYTE [DISTWIDTH] IN BLOOD BY AUTOMATED COUNT: 12.4 % (ref 11–15)
EST. AVERAGE GLUCOSE BLD GHB EST-MCNC: 309 MG/DL (ref 68–126)
GLOBULIN PLAS-MCNC: 3.9 G/DL (ref 2.8–4.4)
GLUCOSE BLD-MCNC: 290 MG/DL (ref 70–99)
HBA1C MFR BLD HPLC: 12.4 % (ref ?–5.7)
HCT VFR BLD AUTO: 46.2 % (ref 39–53)
HDLC SERPL-MCNC: 41 MG/DL (ref 40–59)
HGB BLD-MCNC: 16.4 G/DL (ref 13–17.5)
IMM GRANULOCYTES # BLD AUTO: 0.02 X10(3) UL (ref 0–1)
IMM GRANULOCYTES NFR BLD: 0.3 %
LDLC SERPL CALC-MCNC: 219 MG/DL (ref ?–100)
LYMPHOCYTES # BLD AUTO: 1.98 X10(3) UL (ref 1–4)
LYMPHOCYTES NFR BLD AUTO: 26 %
M PROTEIN MFR SERPL ELPH: 8.1 G/DL (ref 6.4–8.2)
MCH RBC QN AUTO: 30 PG (ref 26–34)
MCHC RBC AUTO-ENTMCNC: 35.5 G/DL (ref 31–37)
MCV RBC AUTO: 84.5 FL (ref 80–100)
MICROALBUMIN UR-MCNC: 13.8 MG/DL
MICROALBUMIN/CREAT 24H UR-RTO: 47.6 UG/MG (ref ?–30)
MONOCYTES # BLD AUTO: 0.48 X10(3) UL (ref 0.1–1)
MONOCYTES NFR BLD AUTO: 6.3 %
NEUTROPHILS # BLD AUTO: 4.98 X10 (3) UL (ref 1.5–7.7)
NEUTROPHILS # BLD AUTO: 4.98 X10(3) UL (ref 1.5–7.7)
NEUTROPHILS NFR BLD AUTO: 65.3 %
NONHDLC SERPL-MCNC: 244 MG/DL (ref ?–130)
OSMOLALITY SERPL CALC.SUM OF ELEC: 295 MOSM/KG (ref 275–295)
PLATELET # BLD AUTO: 204 10(3)UL (ref 150–450)
POTASSIUM SERPL-SCNC: 4.2 MMOL/L (ref 3.5–5.1)
RBC # BLD AUTO: 5.47 X10(6)UL (ref 4.3–5.7)
SODIUM SERPL-SCNC: 137 MMOL/L (ref 136–145)
T4 FREE SERPL-MCNC: 1.3 NG/DL (ref 0.8–1.7)
TRIGL SERPL-MCNC: 126 MG/DL (ref 30–149)
TSI SER-ACNC: 1.62 MIU/ML (ref 0.36–3.74)
VLDLC SERPL CALC-MCNC: 25 MG/DL (ref 0–30)
WBC # BLD AUTO: 7.6 X10(3) UL (ref 4–11)

## 2019-09-06 PROCEDURE — 83036 HEMOGLOBIN GLYCOSYLATED A1C: CPT

## 2019-09-06 PROCEDURE — 84443 ASSAY THYROID STIM HORMONE: CPT

## 2019-09-06 PROCEDURE — 36415 COLL VENOUS BLD VENIPUNCTURE: CPT

## 2019-09-06 PROCEDURE — 84439 ASSAY OF FREE THYROXINE: CPT

## 2019-09-06 PROCEDURE — 80061 LIPID PANEL: CPT

## 2019-09-06 PROCEDURE — 82043 UR ALBUMIN QUANTITATIVE: CPT

## 2019-09-06 PROCEDURE — 82570 ASSAY OF URINE CREATININE: CPT

## 2019-09-06 PROCEDURE — 85025 COMPLETE CBC W/AUTO DIFF WBC: CPT

## 2019-09-06 PROCEDURE — 80053 COMPREHEN METABOLIC PANEL: CPT

## 2019-09-06 RX ORDER — MONTELUKAST SODIUM 10 MG/1
TABLET ORAL
Qty: 90 TABLET | Refills: 0 | Status: SHIPPED | OUTPATIENT
Start: 2019-09-06 | End: 2019-09-17

## 2019-09-17 ENCOUNTER — OFFICE VISIT (OUTPATIENT)
Dept: FAMILY MEDICINE CLINIC | Facility: CLINIC | Age: 44
End: 2019-09-17

## 2019-09-17 VITALS
TEMPERATURE: 98 F | HEART RATE: 80 BPM | BODY MASS INDEX: 26.04 KG/M2 | HEIGHT: 71 IN | WEIGHT: 186 LBS | DIASTOLIC BLOOD PRESSURE: 78 MMHG | SYSTOLIC BLOOD PRESSURE: 118 MMHG | RESPIRATION RATE: 16 BRPM

## 2019-09-17 DIAGNOSIS — IMO0001 UNCONTROLLED TYPE 2 DIABETES MELLITUS WITHOUT COMPLICATION, WITHOUT LONG-TERM CURRENT USE OF INSULIN: Primary | ICD-10-CM

## 2019-09-17 DIAGNOSIS — L30.9 ECZEMA, UNSPECIFIED TYPE: ICD-10-CM

## 2019-09-17 DIAGNOSIS — E78.00 PURE HYPERCHOLESTEROLEMIA WITH TARGET LOW DENSITY LIPOPROTEIN (LDL) CHOLESTEROL LESS THAN 130 MG/DL: ICD-10-CM

## 2019-09-17 PROCEDURE — 99215 OFFICE O/P EST HI 40 MIN: CPT | Performed by: FAMILY MEDICINE

## 2019-09-17 RX ORDER — LOSARTAN POTASSIUM 50 MG/1
TABLET ORAL
Qty: 90 TABLET | Refills: 1 | Status: SHIPPED | OUTPATIENT
Start: 2019-09-17 | End: 2020-01-15

## 2019-09-17 NOTE — PATIENT INSTRUCTIONS
Start insulin at 10 units.   Increase 2 units every other day until seen by diabetic clinic (max 25 units)

## 2019-09-18 NOTE — PROGRESS NOTES
Patient presents with:  Diabetes      Vanessa Willingham is a 53year old year old who presents for recheck of diabetes. Pt has been checking feet on a regular basis. Pt denies any tingling of the feet.  Pt denies any sx's of depression and reports mood's s TABLET(50 MG) BY MOUTH EVERY DAY -   A1C 4.3 - 5.6 % - - - - - - -   A1C <5.7 % of total Hgb - - - - - - -   Weight (enc vitals) - 186 lb - - - 194 lb - -   BP (enc vitals) - 118/78 - - - 130/84 - -   Last Foot Exam - - - - - - - -   Last Eye Exam - - - - 31G X 5/16\" 1 ML Does not apply Misc, Use as directed, Disp: 100 each, Rfl: 0     Last documented BP: 118/78    HEMOGLOBIN A1C (%)   Date Value   01/10/2019 10.6 (A)   12/12/2016 8.5 (A)   09/26/2016 8.0 (A)     HgbA1C (%)   Date Value   09/06/2019 12.4 ( The patient is not nervous/anxious. No depression.     Patient Active Problem List:     Uncontrolled insulin dependent diabetes mellitus (HCC)     Non compliance w medication regimen     Pure hypercholesterolemia with target low density lipoprotein (LDL) ch complication, not stated as uncontrolled      Past Surgical History:   Procedure Laterality Date   • APPENDECTOMY  10 yrs ago     Family History   Problem Relation Age of Onset   • Diabetes Father    • Diabetes Mother      Social History    Tobacco Use long-term current use of insulin (hcc)  (primary encounter diagnosis)  Pure hypercholesterolemia with target low density lipoprotein (ldl) cholesterol less than 130 mg/dl  Eczema, unspecified type  Patient has a long history of noncompliance with medicatio

## 2019-10-14 ENCOUNTER — TELEPHONE (OUTPATIENT)
Dept: FAMILY MEDICINE CLINIC | Facility: CLINIC | Age: 44
End: 2019-10-14

## 2019-10-26 DIAGNOSIS — IMO0001 UNCONTROLLED INSULIN DEPENDENT DIABETES MELLITUS: ICD-10-CM

## 2019-10-28 RX ORDER — BLOOD SUGAR DIAGNOSTIC
STRIP MISCELLANEOUS
Qty: 300 STRIP | Refills: 0 | Status: SHIPPED | OUTPATIENT
Start: 2019-10-28 | End: 2021-07-19

## 2019-10-28 RX ORDER — INSULIN GLARGINE 100 [IU]/ML
INJECTION, SOLUTION SUBCUTANEOUS
Qty: 90 ML | Refills: 0 | Status: SHIPPED | OUTPATIENT
Start: 2019-10-28 | End: 2020-01-15

## 2019-10-28 NOTE — TELEPHONE ENCOUNTER
Medication(s) to Refill:   Requested Prescriptions     Pending Prescriptions Disp Refills   • Insulin Pen Needle (BD PEN NEEDLE MINI U/F) 31G X 5 MM Does not apply Misc [Pharmacy Med Name: B-D PEN NDL MINI [id number](3/16)PRPL]  0     Sig: USE DAILY WITH LANT

## 2019-12-05 RX ORDER — MONTELUKAST SODIUM 10 MG/1
TABLET ORAL
Qty: 90 TABLET | Refills: 0 | Status: SHIPPED | OUTPATIENT
Start: 2019-12-05 | End: 2020-01-15

## 2020-01-15 ENCOUNTER — OFFICE VISIT (OUTPATIENT)
Dept: FAMILY MEDICINE CLINIC | Facility: CLINIC | Age: 45
End: 2020-01-15

## 2020-01-15 VITALS
WEIGHT: 188 LBS | DIASTOLIC BLOOD PRESSURE: 82 MMHG | HEIGHT: 71 IN | OXYGEN SATURATION: 98 % | SYSTOLIC BLOOD PRESSURE: 114 MMHG | TEMPERATURE: 98 F | BODY MASS INDEX: 26.32 KG/M2 | RESPIRATION RATE: 22 BRPM | HEART RATE: 80 BPM

## 2020-01-15 DIAGNOSIS — E11.65 UNCONTROLLED TYPE 2 DIABETES MELLITUS WITH HYPERGLYCEMIA (HCC): ICD-10-CM

## 2020-01-15 DIAGNOSIS — J06.9 VIRAL URI WITH COUGH: Primary | ICD-10-CM

## 2020-01-15 DIAGNOSIS — IMO0001 UNCONTROLLED INSULIN DEPENDENT DIABETES MELLITUS: ICD-10-CM

## 2020-01-15 PROCEDURE — 99213 OFFICE O/P EST LOW 20 MIN: CPT | Performed by: NURSE PRACTITIONER

## 2020-01-15 RX ORDER — LOSARTAN POTASSIUM 50 MG/1
TABLET ORAL
Qty: 90 TABLET | Refills: 1 | Status: SHIPPED | OUTPATIENT
Start: 2020-01-15 | End: 2020-08-21

## 2020-01-15 RX ORDER — FLUTICASONE PROPIONATE 50 MCG
2 SPRAY, SUSPENSION (ML) NASAL DAILY
Qty: 1 BOTTLE | Refills: 0 | Status: SHIPPED | OUTPATIENT
Start: 2020-01-15 | End: 2020-01-29

## 2020-01-15 RX ORDER — BENZONATATE 200 MG/1
200 CAPSULE ORAL 3 TIMES DAILY PRN
Qty: 20 CAPSULE | Refills: 0 | Status: SHIPPED | OUTPATIENT
Start: 2020-01-15 | End: 2020-03-12

## 2020-01-15 NOTE — PATIENT INSTRUCTIONS
Rest and Fluids  Fluticasone as prescribed  Benzonatate as prescribed  May try zyrtec as packet insert     Follow-up it not improving      Viral Upper Respiratory Illness (Adult)    You have a viral upper respiratory illness (URI), which is another term fo take prescription medicines, ask your healthcare provider or pharmacist which over-the-counter medicines are safe to use.  (Note: Don't use decongestants if you have high blood pressure.)  Follow-up care  Follow up with your healthcare provider, or as advis

## 2020-01-15 NOTE — PROGRESS NOTES
CHIEF COMPLAINT:   Patient presents with:  Nasal Congestion: Dry cough, feeling weak with no energy yesterday, X 4 days   Note For Work          HPI:   Heladio Colon is a 53year old male who presents for upper respiratory symptoms for  4 days.  Patient • LANTUS SOLOSTAR 100 UNIT/ML Subcutaneous Solution Pen-injector INJECT 25 UNITS UNDER THE SKIN NIGHTLY (Patient not taking: Reported on 1/15/2020) 90 mL 0   • Montelukast Sodium 10 MG Oral Tab TAKE 1 TABLET(10 MG) BY MOUTH DAILY (Patient not taking: Repor ASSESSMENT: Viral uri with cough  (primary encounter diagnosis)    PLAN:   Meds as below. Risks, benefits, and side effects of medication explained and discussed. Discussed likely viral etiology. Reviewed symptom relief measures with patient.    To f/u wi · You may use acetaminophen or ibuprofen to control pain and fever, unless another medicine was prescribed. If you have chronic liver or kidney disease, have ever had a stomach ulcer or gastrointestinal bleeding, or are taking blood-thinning medicines, paige © 1347-7929 [address]. [address] [facility] [place], [address] [place]. All rights reserved. This information is not intended as a substitute for professional medical care. Always follow your healthcare professional's instructions.             The

## 2020-01-15 NOTE — TELEPHONE ENCOUNTER
Pt came into [facility] and requested refills of pending medication. (Losartan also requested but refilled per protocol.)  [facility]:[birth year]  LF: 10/28/19  Pt states he is currently out of this medication.   Please approve or deny pending Rx request.   Thank you

## 2020-01-27 ENCOUNTER — PATIENT OUTREACH (OUTPATIENT)
Dept: FAMILY MEDICINE CLINIC | Facility: CLINIC | Age: 45
End: 2020-01-27

## 2020-03-05 RX ORDER — ATORVASTATIN CALCIUM 10 MG/1
TABLET, FILM COATED ORAL
Qty: 90 TABLET | Refills: 0 | Status: SHIPPED | OUTPATIENT
Start: 2020-03-05 | End: 2020-03-16

## 2020-03-12 ENCOUNTER — HOSPITAL ENCOUNTER (EMERGENCY)
Facility: HOSPITAL | Age: 45
Discharge: HOME | End: 2020-03-12
Attending: EMERGENCY MEDICINE
Payer: COMMERCIAL

## 2020-03-12 ENCOUNTER — APPOINTMENT (OUTPATIENT)
Dept: GENERAL RADIOLOGY | Facility: HOSPITAL | Age: 45
End: 2020-03-12
Payer: COMMERCIAL

## 2020-03-12 VITALS
HEIGHT: 71 IN | BODY MASS INDEX: 25.9 KG/M2 | OXYGEN SATURATION: 97 % | WEIGHT: 185 LBS | SYSTOLIC BLOOD PRESSURE: 122 MMHG | TEMPERATURE: 98 F | DIASTOLIC BLOOD PRESSURE: 94 MMHG | HEART RATE: 81 BPM | RESPIRATION RATE: 17 BRPM

## 2020-03-12 DIAGNOSIS — I10 ESSENTIAL HYPERTENSION: Primary | ICD-10-CM

## 2020-03-12 DIAGNOSIS — E11.65 TYPE 2 DIABETES MELLITUS WITH HYPERGLYCEMIA, WITHOUT LONG-TERM CURRENT USE OF INSULIN (HCC): ICD-10-CM

## 2020-03-12 DIAGNOSIS — R07.9 CHEST PAIN OF UNCERTAIN ETIOLOGY: ICD-10-CM

## 2020-03-12 LAB
ALBUMIN SERPL-MCNC: 2.9 G/DL (ref 3.4–5)
ALBUMIN/GLOB SERPL: 1 {RATIO} (ref 1–2)
ALP LIVER SERPL-CCNC: 48 U/L (ref 45–117)
ALT SERPL-CCNC: 23 U/L (ref 16–61)
ANION GAP SERPL CALC-SCNC: 4 MMOL/L (ref 0–18)
AST SERPL-CCNC: 17 U/L (ref 15–37)
BASOPHILS # BLD AUTO: 0.05 X10(3) UL (ref 0–0.2)
BASOPHILS NFR BLD AUTO: 0.7 %
BILIRUB SERPL-MCNC: 0.4 MG/DL (ref 0.1–2)
BUN BLD-MCNC: 9 MG/DL (ref 7–18)
BUN/CREAT SERPL: 12.7 (ref 10–20)
CALCIUM BLD-MCNC: 6.5 MG/DL (ref 8.5–10.1)
CHLORIDE SERPL-SCNC: 112 MMOL/L (ref 98–112)
CO2 SERPL-SCNC: 24 MMOL/L (ref 21–32)
CREAT BLD-MCNC: 0.71 MG/DL (ref 0.7–1.3)
DEPRECATED RDW RBC AUTO: 38.2 FL (ref 35.1–46.3)
EOSINOPHIL # BLD AUTO: 0.09 X10(3) UL (ref 0–0.7)
EOSINOPHIL NFR BLD AUTO: 1.2 %
ERYTHROCYTE [DISTWIDTH] IN BLOOD BY AUTOMATED COUNT: 12.6 % (ref 11–15)
GLOBULIN PLAS-MCNC: 2.8 G/DL (ref 2.8–4.4)
GLUCOSE BLD-MCNC: 289 MG/DL (ref 70–99)
HCT VFR BLD AUTO: 42.9 % (ref 39–53)
HGB BLD-MCNC: 15.3 G/DL (ref 13–17.5)
IMM GRANULOCYTES # BLD AUTO: 0.01 X10(3) UL (ref 0–1)
IMM GRANULOCYTES NFR BLD: 0.1 %
LYMPHOCYTES # BLD AUTO: 1.96 X10(3) UL (ref 1–4)
LYMPHOCYTES NFR BLD AUTO: 25.9 %
M PROTEIN MFR SERPL ELPH: 5.7 G/DL (ref 6.4–8.2)
MCH RBC QN AUTO: 29.8 PG (ref 26–34)
MCHC RBC AUTO-ENTMCNC: 35.7 G/DL (ref 31–37)
MCV RBC AUTO: 83.6 FL (ref 80–100)
MONOCYTES # BLD AUTO: 0.4 X10(3) UL (ref 0.1–1)
MONOCYTES NFR BLD AUTO: 5.3 %
NEUTROPHILS # BLD AUTO: 5.05 X10 (3) UL (ref 1.5–7.7)
NEUTROPHILS # BLD AUTO: 5.05 X10(3) UL (ref 1.5–7.7)
NEUTROPHILS NFR BLD AUTO: 66.8 %
OSMOLALITY SERPL CALC.SUM OF ELEC: 299 MOSM/KG (ref 275–295)
PLATELET # BLD AUTO: 178 10(3)UL (ref 150–450)
POTASSIUM SERPL-SCNC: 3.1 MMOL/L (ref 3.5–5.1)
RBC # BLD AUTO: 5.13 X10(6)UL (ref 4.3–5.7)
SODIUM SERPL-SCNC: 140 MMOL/L (ref 136–145)
TROPONIN I SERPL-MCNC: <0.045 NG/ML (ref ?–0.04)
TROPONIN I SERPL-MCNC: <0.045 NG/ML (ref ?–0.04)
WBC # BLD AUTO: 7.6 X10(3) UL (ref 4–11)

## 2020-03-12 PROCEDURE — 85025 COMPLETE CBC W/AUTO DIFF WBC: CPT | Performed by: EMERGENCY MEDICINE

## 2020-03-12 PROCEDURE — 99285 EMERGENCY DEPT VISIT HI MDM: CPT

## 2020-03-12 PROCEDURE — 85025 COMPLETE CBC W/AUTO DIFF WBC: CPT

## 2020-03-12 PROCEDURE — 71045 X-RAY EXAM CHEST 1 VIEW: CPT

## 2020-03-12 PROCEDURE — 84484 ASSAY OF TROPONIN QUANT: CPT

## 2020-03-12 PROCEDURE — 84484 ASSAY OF TROPONIN QUANT: CPT | Performed by: EMERGENCY MEDICINE

## 2020-03-12 PROCEDURE — 93005 ELECTROCARDIOGRAM TRACING: CPT

## 2020-03-12 PROCEDURE — 96372 THER/PROPH/DIAG INJ SC/IM: CPT

## 2020-03-12 PROCEDURE — 80053 COMPREHEN METABOLIC PANEL: CPT

## 2020-03-12 PROCEDURE — 36415 COLL VENOUS BLD VENIPUNCTURE: CPT

## 2020-03-12 PROCEDURE — 93010 ELECTROCARDIOGRAM REPORT: CPT

## 2020-03-12 PROCEDURE — 80053 COMPREHEN METABOLIC PANEL: CPT | Performed by: EMERGENCY MEDICINE

## 2020-03-12 RX ORDER — INSULIN ASPART 100 [IU]/ML
0.1 INJECTION, SOLUTION INTRAVENOUS; SUBCUTANEOUS ONCE
Status: COMPLETED | OUTPATIENT
Start: 2020-03-12 | End: 2020-03-12

## 2020-03-12 RX ORDER — POTASSIUM CHLORIDE 20 MEQ/1
40 TABLET, EXTENDED RELEASE ORAL ONCE
Status: COMPLETED | OUTPATIENT
Start: 2020-03-12 | End: 2020-03-12

## 2020-03-12 NOTE — ED PROVIDER NOTES
Patient Seen in: [facility] Emergency Department      History   Patient presents with:  Hypertension    Stated Complaint: hypertensive, chest pain, anxious    HPI    CHIEF COMPLAINT: Chest pain, hypertension, anxiety    HISTORY OF PRESENT ILLNESS: A stated as uncontrolled               Past Surgical History:   Procedure Laterality Date   • APPENDECTOMY  10 yrs ago                    Social History    Tobacco Use      Smoking status: Never Smoker      Smokeless tobacco: Never Used    Alcohol use:  Yes 299 (*)     Total Protein 5.7 (*)     Albumin 2.9 (*)     All other components within normal limits   TROPONIN I - Normal   TROPONIN I - Normal   CBC WITH DIFFERENTIAL WITH PLATELET    Narrative:      The following orders were created for panel order CBC WI Score:  0   EKG:  Normal EKG Score:  0   HTN:  No   Hypercholesterolemia:  Yes   Atherosclerosis/PVD:  No   DM:   Yes   BMI>30kg/m2:  No   Smoking:  No   Family History:  No         Other Risk Factor Score:  2           Lab Results   Component Value Date I answered all of the patient's questions prior to discharge. Patient felt comfortable going home.         Disposition and Plan     Clinical Impression:  Essential hypertension  (primary encounter diagnosis)  Type 2 diabetes mellitus with hyperglycemia, wi

## 2020-03-12 NOTE — ED INITIAL ASSESSMENT (HPI)
Patient presents for evaluation of chest pain, hypertension, and shortness of breath that began after a stressful upsetting meeting at work. EMS was called and his original blood pressure was 210/110.  On arrival it is 147/92 and he is feeling decrease in s

## 2020-03-14 LAB
ATRIAL RATE: 73 BPM
ATRIAL RATE: 83 BPM
P AXIS: 35 DEGREES
P AXIS: 37 DEGREES
P-R INTERVAL: 176 MS
P-R INTERVAL: 182 MS
Q-T INTERVAL: 334 MS
Q-T INTERVAL: 352 MS
QRS DURATION: 100 MS
QRS DURATION: 80 MS
QTC CALCULATION (BEZET): 387 MS
QTC CALCULATION (BEZET): 392 MS
R AXIS: -18 DEGREES
R AXIS: -25 DEGREES
T AXIS: 33 DEGREES
T AXIS: 35 DEGREES
VENTRICULAR RATE: 73 BPM
VENTRICULAR RATE: 83 BPM

## 2020-03-16 ENCOUNTER — APPOINTMENT (OUTPATIENT)
Dept: LAB | Age: 45
End: 2020-03-16
Attending: FAMILY MEDICINE
Payer: COMMERCIAL

## 2020-03-16 ENCOUNTER — OFFICE VISIT (OUTPATIENT)
Dept: FAMILY MEDICINE CLINIC | Facility: CLINIC | Age: 45
End: 2020-03-16

## 2020-03-16 VITALS
HEART RATE: 92 BPM | WEIGHT: 182 LBS | TEMPERATURE: 98 F | HEIGHT: 71 IN | RESPIRATION RATE: 16 BRPM | SYSTOLIC BLOOD PRESSURE: 108 MMHG | DIASTOLIC BLOOD PRESSURE: 74 MMHG | BODY MASS INDEX: 25.48 KG/M2

## 2020-03-16 DIAGNOSIS — E78.00 PURE HYPERCHOLESTEROLEMIA WITH TARGET LOW DENSITY LIPOPROTEIN (LDL) CHOLESTEROL LESS THAN 130 MG/DL: ICD-10-CM

## 2020-03-16 DIAGNOSIS — E11.65 UNCONTROLLED TYPE 2 DIABETES MELLITUS WITH HYPERGLYCEMIA (HCC): Primary | ICD-10-CM

## 2020-03-16 DIAGNOSIS — IMO0001 UNCONTROLLED TYPE 2 DIABETES MELLITUS WITHOUT COMPLICATION, WITHOUT LONG-TERM CURRENT USE OF INSULIN: ICD-10-CM

## 2020-03-16 PROBLEM — H00.012 HORDEOLUM EXTERNUM OF RIGHT LOWER EYELID: Status: RESOLVED | Noted: 2018-09-15 | Resolved: 2020-03-16

## 2020-03-16 LAB
ALBUMIN SERPL-MCNC: 4.3 G/DL (ref 3.4–5)
ALBUMIN/GLOB SERPL: 1.1 {RATIO} (ref 1–2)
ALP LIVER SERPL-CCNC: 70 U/L (ref 45–117)
ALT SERPL-CCNC: 32 U/L (ref 16–61)
AMYLASE SERPL-CCNC: 71 U/L (ref 25–115)
ANION GAP SERPL CALC-SCNC: 5 MMOL/L (ref 0–18)
AST SERPL-CCNC: 18 U/L (ref 15–37)
BILIRUB SERPL-MCNC: 0.7 MG/DL (ref 0.1–2)
BUN BLD-MCNC: 14 MG/DL (ref 7–18)
BUN/CREAT SERPL: 13.9 (ref 10–20)
CALCIUM BLD-MCNC: 9.5 MG/DL (ref 8.5–10.1)
CHLORIDE SERPL-SCNC: 102 MMOL/L (ref 98–112)
CHOLEST SMN-MCNC: 192 MG/DL (ref ?–200)
CO2 SERPL-SCNC: 28 MMOL/L (ref 21–32)
CREAT BLD-MCNC: 1.01 MG/DL (ref 0.7–1.3)
CREAT UR-SCNC: 205 MG/DL
EST. AVERAGE GLUCOSE BLD GHB EST-MCNC: 335 MG/DL (ref 68–126)
GLOBULIN PLAS-MCNC: 3.9 G/DL (ref 2.8–4.4)
GLUCOSE BLD-MCNC: 335 MG/DL (ref 70–99)
HBA1C MFR BLD HPLC: 13.3 % (ref ?–5.7)
HDLC SERPL-MCNC: 42 MG/DL (ref 40–59)
LDLC SERPL CALC-MCNC: 134 MG/DL (ref ?–100)
LIPASE SERPL-CCNC: 99 U/L (ref 73–393)
M PROTEIN MFR SERPL ELPH: 8.2 G/DL (ref 6.4–8.2)
MICROALBUMIN UR-MCNC: 9.84 MG/DL
MICROALBUMIN/CREAT 24H UR-RTO: 48 UG/MG (ref ?–30)
NONHDLC SERPL-MCNC: 150 MG/DL (ref ?–130)
OSMOLALITY SERPL CALC.SUM OF ELEC: 294 MOSM/KG (ref 275–295)
PATIENT FASTING Y/N/NP: NO
PATIENT FASTING Y/N/NP: NO
POTASSIUM SERPL-SCNC: 4.1 MMOL/L (ref 3.5–5.1)
SODIUM SERPL-SCNC: 135 MMOL/L (ref 136–145)
TRIGL SERPL-MCNC: 81 MG/DL (ref 30–149)
VLDLC SERPL CALC-MCNC: 16 MG/DL (ref 0–30)

## 2020-03-16 PROCEDURE — 99214 OFFICE O/P EST MOD 30 MIN: CPT | Performed by: FAMILY MEDICINE

## 2020-03-16 PROCEDURE — 82150 ASSAY OF AMYLASE: CPT

## 2020-03-16 PROCEDURE — 83036 HEMOGLOBIN GLYCOSYLATED A1C: CPT

## 2020-03-16 PROCEDURE — 36415 COLL VENOUS BLD VENIPUNCTURE: CPT

## 2020-03-16 PROCEDURE — 82570 ASSAY OF URINE CREATININE: CPT

## 2020-03-16 PROCEDURE — 82043 UR ALBUMIN QUANTITATIVE: CPT

## 2020-03-16 PROCEDURE — 80061 LIPID PANEL: CPT

## 2020-03-16 PROCEDURE — 80053 COMPREHEN METABOLIC PANEL: CPT

## 2020-03-16 PROCEDURE — 83690 ASSAY OF LIPASE: CPT

## 2020-03-16 RX ORDER — ATORVASTATIN CALCIUM 10 MG/1
TABLET, FILM COATED ORAL
Qty: 90 TABLET | Refills: 1 | Status: SHIPPED | OUTPATIENT
Start: 2020-03-16 | End: 2020-08-24

## 2020-03-16 NOTE — PROGRESS NOTES
Patient presents with: Follow - Up      Artis Salma is a 53year old year old who presents for recheck of diabetes. Pt has been checking feet on a regular basis. Pt denies any tingling of the feet.  Pt denies any sx's of depression and reports mood' - - - TAKE 1 TABLET(50 MG) BY MOUTH EVERY DAY   A1C 4.3 - 5.6 % - - - - - - -   A1C <5.7 % of total Hgb - - - - - - -   Weight (enc vitals) - 182 lb - - - - 185 lb 188 lb   BP (enc vitals) - 108/74 122/94 130/96 131/90 137/91 147/92 114/82   Last Foot Exam Disp: 1 kit, Rfl: 0  •  MICROLET LANCETS Does not apply Misc, TEST THREE TIMES DAILY, Disp: 300 each, Rfl: 0  •  CONTOUR NEXT TEST In Vitro Strip, USE THREE TIMES DAILY, Disp: 300 strip, Rfl: 0  •  Glucose Blood In Vitro Strip, TEST THREE TIMES DAILY, Disp myalgias, back pain, joint swelling, arthralgias and gait problem. Skin: Negative for color change and rash. Neurological: Negative for dizziness, syncope, weakness, numbness, tingling and headaches. Hematological: Negative for adenopathy.  Does not b TIMES DAILY 300 each 0   • CONTOUR NEXT TEST In Vitro Strip USE THREE TIMES DAILY 300 strip 0   • Glucose Blood In Vitro Strip TEST THREE TIMES DAILY 300 strip 0   • Insulin Syringe 31G X 5/16\" 1 ML Does not apply Misc Use as directed 100 each 0       Pas temperature and sensation, monofilament sensory exam is normal in both feet, no edema, redness or tenderness in the calves or thighs    Skin: Skin is warm and dry. No rash noted. No erythema. No pallor. Papular rash along upper extremities.       Psychiatri

## 2020-03-16 NOTE — TELEPHONE ENCOUNTER
Medication(s) to Refill:   Requested Prescriptions     Pending Prescriptions Disp Refills   • METFORMIN HCL 1000 MG Oral Tab [Pharmacy Med Name: METFORMIN 1000MG TABLETS] 180 tablet 1     Sig: TAKE 1 TABLET(1000 MG) BY MOUTH TWICE DAILY         Reason for

## 2020-03-19 ENCOUNTER — TELEPHONE (OUTPATIENT)
Dept: FAMILY MEDICINE CLINIC | Facility: CLINIC | Age: 45
End: 2020-03-19

## 2020-03-19 DIAGNOSIS — E11.65 UNCONTROLLED TYPE 2 DIABETES MELLITUS WITH HYPERGLYCEMIA (HCC): Primary | ICD-10-CM

## 2020-03-19 NOTE — TELEPHONE ENCOUNTER
----- Message from Rafita Ramirez MD sent at 3/18/2020  3:55 PM CDT -----  Let patient know that diabetic labs are worse as expected.   advise him on compliance with all medication including his insulin/metformin recheck blood work in 3 months, needs to see d

## 2020-03-19 NOTE — TELEPHONE ENCOUNTER
Called patient and spoke with him. Advised him okay for note. Patient sates understanding. Patient requesting to pickup note in office. Note placed at  for patient pickup.

## 2020-03-19 NOTE — TELEPHONE ENCOUNTER
Called patient and spoke with him. Advised him of results and POC below. Patient states understanding. Patient provided Laly's office information. Repeat lab orders placed in Epic. Patient was given a note to be off until Monday.   Patient is ask

## 2020-03-24 ENCOUNTER — TELEPHONE (OUTPATIENT)
Dept: FAMILY MEDICINE CLINIC | Facility: CLINIC | Age: 45
End: 2020-03-24

## 2020-03-24 NOTE — TELEPHONE ENCOUNTER
FMLA from [employer] [facility] received via fax. FMLA completed and signed by provider. Called patient and spoke with him. Advised him of this information. Patient states understanding. Patient is requesting this be faxed.     FMLA faxed as requested along wit

## 2020-04-10 ENCOUNTER — TELEPHONE (OUTPATIENT)
Dept: FAMILY MEDICINE CLINIC | Facility: CLINIC | Age: 45
End: 2020-04-10

## 2020-04-10 NOTE — TELEPHONE ENCOUNTER
Spoke to pt - he states that Dr. Rasta Ann had provided a letter to excuse him from working more than 40 hours per week, no overtime - was given to him via [employer] on 3/19/2020.      According to his jobs [address] - he needs a Reasonable Accomodation Med

## 2020-04-10 NOTE — TELEPHONE ENCOUNTER
Pt dropped off form to be filled out for his employer. Original in Triage and copy placed in fax room. Ruma Latif filled out and given to triage.

## 2020-04-11 NOTE — TELEPHONE ENCOUNTER
Get more information on what \"impairment\" we are to include is thi. He got into an argument with his manager and was in ER for high blood pressure, since then he's been at baseline.  He's never required \"impairment\" documentation in the past.     His d

## 2020-04-13 NOTE — TELEPHONE ENCOUNTER
Called patient and spoke with him. Patient is okay with us including Hypertension in his accomodation form instead of stress. Patient is needing this form to not work any overtime. Patient was concerned with his employer having his medical information.

## 2020-04-15 NOTE — TELEPHONE ENCOUNTER
Forms completed with assistance from PCP and signed. Forms faxed to employer as requested, release forms signed previously by patient. Fax confirmation received. Called and informed pt of form completion and confirmation received via fax.  Pt verbal

## 2020-04-17 ENCOUNTER — MED REC SCAN ONLY (OUTPATIENT)
Dept: FAMILY MEDICINE CLINIC | Facility: CLINIC | Age: 45
End: 2020-04-17

## 2020-04-17 NOTE — TELEPHONE ENCOUNTER
Patient called back new fax number is [phone]. Patient is wondering if we can scan and e-mail to him at [email or web address]. Any questions call patient at [phone].

## 2020-04-17 NOTE — TELEPHONE ENCOUNTER
Attempted to resend (copy) of form to employer via fax as requested to [phone] (original in scan). Fax failed 3x. Called to confirm fax number with employer, Annita Olson, with [employer].  KAVIN Cain to call back to confirm fax numbrobert

## 2020-04-21 RX ORDER — MONTELUKAST SODIUM 10 MG/1
TABLET ORAL
Qty: 90 TABLET | Refills: 0 | Status: SHIPPED | OUTPATIENT
Start: 2020-04-21 | End: 2020-07-20

## 2020-05-30 RX ORDER — MONTELUKAST SODIUM 10 MG/1
TABLET ORAL
Qty: 90 TABLET | Refills: 0 | Status: CANCELLED | OUTPATIENT
Start: 2020-05-30

## 2020-06-01 RX ORDER — MONTELUKAST SODIUM 10 MG/1
TABLET ORAL
Qty: 90 TABLET | Refills: 0 | OUTPATIENT
Start: 2020-06-01

## 2020-07-20 RX ORDER — MONTELUKAST SODIUM 10 MG/1
TABLET ORAL
Qty: 90 TABLET | Refills: 0 | Status: SHIPPED | OUTPATIENT
Start: 2020-07-20 | End: 2020-10-19

## 2020-07-28 NOTE — TELEPHONE ENCOUNTER
[facility] referral placed after I spoke to patient about worsening ROGER in light of covid. He will come in a few weeks for appointment.

## 2020-07-29 ENCOUNTER — TELEPHONE (OUTPATIENT)
Dept: FAMILY MEDICINE CLINIC | Facility: CLINIC | Age: 45
End: 2020-07-29

## 2020-07-29 DIAGNOSIS — E11.65 UNCONTROLLED TYPE 2 DIABETES MELLITUS WITH HYPERGLYCEMIA (HCC): ICD-10-CM

## 2020-08-19 NOTE — TELEPHONE ENCOUNTER
Pharmacy calling, stating that pt is needing a refill on METFORMIN HCL 1000 MG Oral Tab, pt is out of medications and that needs to be sent to the [facility] in [place].  Please advise

## 2020-08-21 DIAGNOSIS — E11.65 UNCONTROLLED TYPE 2 DIABETES MELLITUS WITH HYPERGLYCEMIA (HCC): ICD-10-CM

## 2020-08-21 RX ORDER — LOSARTAN POTASSIUM 50 MG/1
TABLET ORAL
Qty: 90 TABLET | Refills: 1 | Status: SHIPPED | OUTPATIENT
Start: 2020-08-21 | End: 2021-03-15

## 2020-08-23 DIAGNOSIS — E78.00 PURE HYPERCHOLESTEROLEMIA WITH TARGET LOW DENSITY LIPOPROTEIN (LDL) CHOLESTEROL LESS THAN 130 MG/DL: ICD-10-CM

## 2020-08-25 RX ORDER — ATORVASTATIN CALCIUM 10 MG/1
TABLET, FILM COATED ORAL
Qty: 90 TABLET | Refills: 0 | Status: SHIPPED | OUTPATIENT
Start: 2020-08-25 | End: 2020-10-19

## 2020-10-19 ENCOUNTER — OFFICE VISIT (OUTPATIENT)
Dept: FAMILY MEDICINE CLINIC | Facility: CLINIC | Age: 45
End: 2020-10-19

## 2020-10-19 VITALS
HEIGHT: 71 IN | HEART RATE: 88 BPM | SYSTOLIC BLOOD PRESSURE: 122 MMHG | BODY MASS INDEX: 25.06 KG/M2 | OXYGEN SATURATION: 96 % | RESPIRATION RATE: 16 BRPM | TEMPERATURE: 97 F | DIASTOLIC BLOOD PRESSURE: 76 MMHG | WEIGHT: 179 LBS

## 2020-10-19 DIAGNOSIS — E78.00 PURE HYPERCHOLESTEROLEMIA WITH TARGET LOW DENSITY LIPOPROTEIN (LDL) CHOLESTEROL LESS THAN 130 MG/DL: ICD-10-CM

## 2020-10-19 DIAGNOSIS — Z00.00 ANNUAL PHYSICAL EXAM: Primary | ICD-10-CM

## 2020-10-19 DIAGNOSIS — E11.65 UNCONTROLLED TYPE 2 DIABETES MELLITUS WITH HYPERGLYCEMIA (HCC): ICD-10-CM

## 2020-10-19 DIAGNOSIS — Z23 NEED FOR VACCINATION: ICD-10-CM

## 2020-10-19 DIAGNOSIS — Z12.11 COLON CANCER SCREENING: ICD-10-CM

## 2020-10-19 PROCEDURE — 3008F BODY MASS INDEX DOCD: CPT | Performed by: FAMILY MEDICINE

## 2020-10-19 PROCEDURE — 99214 OFFICE O/P EST MOD 30 MIN: CPT | Performed by: FAMILY MEDICINE

## 2020-10-19 PROCEDURE — 3078F DIAST BP <80 MM HG: CPT | Performed by: FAMILY MEDICINE

## 2020-10-19 PROCEDURE — 99396 PREV VISIT EST AGE 40-64: CPT | Performed by: FAMILY MEDICINE

## 2020-10-19 PROCEDURE — 90686 IIV4 VACC NO PRSV 0.5 ML IM: CPT | Performed by: FAMILY MEDICINE

## 2020-10-19 PROCEDURE — 90471 IMMUNIZATION ADMIN: CPT | Performed by: FAMILY MEDICINE

## 2020-10-19 PROCEDURE — 3074F SYST BP LT 130 MM HG: CPT | Performed by: FAMILY MEDICINE

## 2020-10-19 RX ORDER — MONTELUKAST SODIUM 10 MG/1
10 TABLET ORAL DAILY
Qty: 90 TABLET | Refills: 1 | Status: SHIPPED | OUTPATIENT
Start: 2020-10-19 | End: 2021-07-19 | Stop reason: ALTCHOICE

## 2020-10-19 RX ORDER — ATORVASTATIN CALCIUM 10 MG/1
10 TABLET, FILM COATED ORAL NIGHTLY
Qty: 90 TABLET | Refills: 1 | Status: SHIPPED | OUTPATIENT
Start: 2020-10-19 | End: 2021-03-08

## 2020-10-20 NOTE — PROGRESS NOTES
Chief Complaint:   Patient presents with:  Physical    HPI:   This is a 50year old male presenting for annual physical.  Patient has a notorious history of being noncompliant with medical regimen especially taking his Lantus has been off of his Lantus for MG Oral Tab Take 1 tablet (1,000 mg total) by mouth 2 (two) times daily. 180 tablet 0   • Montelukast Sodium 10 MG Oral Tab Take 1 tablet (10 mg total) by mouth daily.  90 tablet 1   • LOSARTAN POTASSIUM 50 MG Oral Tab TAKE 1 TABLET(50 MG) BY MOUTH EVERY DA stated age, well groomed. Physical Exam   Nursing note and vitals reviewed. Constitutional: He is oriented to person, place, and time. He appears well-developed and well-nourished. No distress. HENT:   Head: Normocephalic and atraumatic.    Nose: Nose now  - OPHTHALMOLOGY - INTERNAL    3. Pure hypercholesterolemia with target low density lipoprotein (LDL) cholesterol less than 130 mg/dL  -due for labs  - atorvastatin 10 MG Oral Tab; Take 1 tablet (10 mg total) by mouth nightly. Dispense: 90 tablet;  Ref

## 2020-12-16 ENCOUNTER — TELEPHONE (OUTPATIENT)
Dept: ENDOCRINOLOGY CLINIC | Facility: CLINIC | Age: 45
End: 2020-12-16

## 2020-12-16 NOTE — TELEPHONE ENCOUNTER
Spoke with patient regarding setting up an appointment with a diabetes provider for diabetes management at [facility] based on A1c from high A1c list.Patient scheduled to see CONNOR Stevenson in [facility] on 1/19/2021 at 2:30pm

## 2021-01-29 ENCOUNTER — OFFICE VISIT (OUTPATIENT)
Dept: FAMILY MEDICINE CLINIC | Facility: CLINIC | Age: 46
End: 2021-01-29

## 2021-01-29 VITALS
RESPIRATION RATE: 16 BRPM | DIASTOLIC BLOOD PRESSURE: 82 MMHG | HEIGHT: 71 IN | OXYGEN SATURATION: 98 % | HEART RATE: 91 BPM | WEIGHT: 181 LBS | SYSTOLIC BLOOD PRESSURE: 124 MMHG | TEMPERATURE: 97 F | BODY MASS INDEX: 25.34 KG/M2

## 2021-01-29 DIAGNOSIS — E11.65 UNCONTROLLED TYPE 2 DIABETES MELLITUS WITH HYPERGLYCEMIA (HCC): Primary | ICD-10-CM

## 2021-01-29 DIAGNOSIS — E78.00 PURE HYPERCHOLESTEROLEMIA WITH TARGET LOW DENSITY LIPOPROTEIN (LDL) CHOLESTEROL LESS THAN 130 MG/DL: ICD-10-CM

## 2021-01-29 PROCEDURE — 99214 OFFICE O/P EST MOD 30 MIN: CPT | Performed by: FAMILY MEDICINE

## 2021-01-29 PROCEDURE — 3008F BODY MASS INDEX DOCD: CPT | Performed by: FAMILY MEDICINE

## 2021-01-29 PROCEDURE — 3074F SYST BP LT 130 MM HG: CPT | Performed by: FAMILY MEDICINE

## 2021-01-29 PROCEDURE — 3079F DIAST BP 80-89 MM HG: CPT | Performed by: FAMILY MEDICINE

## 2021-01-29 NOTE — PROGRESS NOTES
Patient presents with: Follow - Up: DM f/u      Kraig Brumfield is a 50year old year old who presents for recheck of diabetes. Pt has been checking feet on a regular basis. Pt denies any tingling of the feet.  Pt denies any sx's of depression and repor lb - 179 lb - 182 lb - -   BP (enc vitals) - 124/82 - 122/76 - 108/74 122/94 130/96   Last Foot Exam - - - - - - - -   Last Eye Exam - - - - - - - -   Eye Doctor Name - - - - - - - -   Eye Exam Location - - - - - - - -   Eye Exam Retinopathy - - - - - - - each, Rfl: 0  •  aspirin (ASPIR-81) 81 MG Oral Tab EC, Take 1 tablet by mouth daily. , Disp: 1 tablet, Rfl: 0     Last documented BP: 124/82    HEMOGLOBIN A1C (%)   Date Value   01/10/2019 10.6 (A)   12/12/2016 8.5 (A)   09/26/2016 8.0 (A)     HgbA1C (%) Does not bruise/bleed easily. Psychiatric/Behavioral: The patient is not nervous/anxious. No depression.     Patient Active Problem List:     Uncontrolled insulin dependent diabetes mellitus     Non compliance w medication regimen     Pure hypercholestero without mention of complication, not stated as uncontrolled      Past Surgical History:   Procedure Laterality Date   • APPENDECTOMY  10 yrs ago     Family History   Problem Relation Age of Onset   • Diabetes Father    • Diabetes Mother      Social History medication. Taking 25 units q hs insulin and currently on metofrmin, losartan, asa, and statin, CPM, due for labs    2.  Pure hypercholesterolemia with target low density lipoprotein (LDL) cholesterol less than 130 mg/dL  -as above.   -compliance has impr

## 2021-02-15 NOTE — TELEPHONE ENCOUNTER
Medication(s) to Refill:   Requested Prescriptions     Pending Prescriptions Disp Refills   • metFORMIN HCl 1000 MG Oral Tab 180 tablet 0     Sig: Take 1 tablet (1,000 mg total) by mouth 2 (two) times daily.          Last Time Medication was Filled:  11/19/

## 2021-02-26 ENCOUNTER — LAB ENCOUNTER (OUTPATIENT)
Dept: LAB | Age: 46
End: 2021-02-26
Attending: FAMILY MEDICINE
Payer: COMMERCIAL

## 2021-02-26 DIAGNOSIS — E11.65 UNCONTROLLED TYPE 2 DIABETES MELLITUS WITH HYPERGLYCEMIA (HCC): ICD-10-CM

## 2021-02-26 LAB
ALBUMIN SERPL-MCNC: 4.3 G/DL (ref 3.4–5)
ALBUMIN/GLOB SERPL: 1.2 {RATIO} (ref 1–2)
ALP LIVER SERPL-CCNC: 55 U/L
ALT SERPL-CCNC: 18 U/L
ANION GAP SERPL CALC-SCNC: 4 MMOL/L (ref 0–18)
AST SERPL-CCNC: 10 U/L (ref 15–37)
BILIRUB SERPL-MCNC: 0.7 MG/DL (ref 0.1–2)
BUN BLD-MCNC: 9 MG/DL (ref 7–18)
BUN/CREAT SERPL: 12.2 (ref 10–20)
CALCIUM BLD-MCNC: 9.2 MG/DL (ref 8.5–10.1)
CHLORIDE SERPL-SCNC: 104 MMOL/L (ref 98–112)
CHOLEST SMN-MCNC: 201 MG/DL (ref ?–200)
CO2 SERPL-SCNC: 30 MMOL/L (ref 21–32)
CREAT BLD-MCNC: 0.74 MG/DL
EST. AVERAGE GLUCOSE BLD GHB EST-MCNC: 237 MG/DL (ref 68–126)
GLOBULIN PLAS-MCNC: 3.5 G/DL (ref 2.8–4.4)
GLUCOSE BLD-MCNC: 163 MG/DL (ref 70–99)
HBA1C MFR BLD HPLC: 9.9 % (ref ?–5.7)
HDLC SERPL-MCNC: 53 MG/DL (ref 40–59)
LDLC SERPL CALC-MCNC: 138 MG/DL (ref ?–100)
M PROTEIN MFR SERPL ELPH: 7.8 G/DL (ref 6.4–8.2)
NONHDLC SERPL-MCNC: 148 MG/DL (ref ?–130)
OSMOLALITY SERPL CALC.SUM OF ELEC: 288 MOSM/KG (ref 275–295)
PATIENT FASTING Y/N/NP: YES
PATIENT FASTING Y/N/NP: YES
POTASSIUM SERPL-SCNC: 3.9 MMOL/L (ref 3.5–5.1)
SODIUM SERPL-SCNC: 138 MMOL/L (ref 136–145)
TRIGL SERPL-MCNC: 48 MG/DL (ref 30–149)
VLDLC SERPL CALC-MCNC: 10 MG/DL (ref 0–30)

## 2021-02-26 PROCEDURE — 80061 LIPID PANEL: CPT

## 2021-02-26 PROCEDURE — 80053 COMPREHEN METABOLIC PANEL: CPT

## 2021-02-26 PROCEDURE — 83036 HEMOGLOBIN GLYCOSYLATED A1C: CPT

## 2021-02-26 PROCEDURE — 36415 COLL VENOUS BLD VENIPUNCTURE: CPT

## 2021-02-26 PROCEDURE — 3046F HEMOGLOBIN A1C LEVEL >9.0%: CPT | Performed by: FAMILY MEDICINE

## 2021-03-04 ENCOUNTER — TELEPHONE (OUTPATIENT)
Dept: FAMILY MEDICINE CLINIC | Facility: CLINIC | Age: 46
End: 2021-03-04

## 2021-03-04 NOTE — TELEPHONE ENCOUNTER
----- Message from Kathleen Oquendo MD sent at 3/3/2021  4:22 PM CST -----  Improved, should see diabetic clinic or f/u with me asap .

## 2021-03-04 NOTE — TELEPHONE ENCOUNTER
Called pt and informed of lab results and below order. Pt states he will f/u with Dr. Baljinder Hylton already scheduled for 3/8 at 11:20am. Pt verbalized understanding.

## 2021-03-08 ENCOUNTER — OFFICE VISIT (OUTPATIENT)
Dept: FAMILY MEDICINE CLINIC | Facility: CLINIC | Age: 46
End: 2021-03-08

## 2021-03-08 VITALS
BODY MASS INDEX: 25.62 KG/M2 | DIASTOLIC BLOOD PRESSURE: 82 MMHG | TEMPERATURE: 97 F | WEIGHT: 183 LBS | HEIGHT: 71 IN | SYSTOLIC BLOOD PRESSURE: 138 MMHG | HEART RATE: 88 BPM | RESPIRATION RATE: 16 BRPM | OXYGEN SATURATION: 97 %

## 2021-03-08 DIAGNOSIS — Z13.0 SCREENING FOR ENDOCRINE, NUTRITIONAL, METABOLIC AND IMMUNITY DISORDER: ICD-10-CM

## 2021-03-08 DIAGNOSIS — E78.00 PURE HYPERCHOLESTEROLEMIA WITH TARGET LOW DENSITY LIPOPROTEIN (LDL) CHOLESTEROL LESS THAN 130 MG/DL: ICD-10-CM

## 2021-03-08 DIAGNOSIS — G60.9 IDIOPATHIC PERIPHERAL NEUROPATHY: ICD-10-CM

## 2021-03-08 DIAGNOSIS — Z13.21 SCREENING FOR ENDOCRINE, NUTRITIONAL, METABOLIC AND IMMUNITY DISORDER: ICD-10-CM

## 2021-03-08 DIAGNOSIS — Z13.228 SCREENING FOR ENDOCRINE, NUTRITIONAL, METABOLIC AND IMMUNITY DISORDER: ICD-10-CM

## 2021-03-08 DIAGNOSIS — Z13.29 SCREENING FOR ENDOCRINE, NUTRITIONAL, METABOLIC AND IMMUNITY DISORDER: ICD-10-CM

## 2021-03-08 DIAGNOSIS — E11.65 UNCONTROLLED TYPE 2 DIABETES MELLITUS WITH HYPERGLYCEMIA (HCC): Primary | ICD-10-CM

## 2021-03-08 PROCEDURE — 3075F SYST BP GE 130 - 139MM HG: CPT | Performed by: FAMILY MEDICINE

## 2021-03-08 PROCEDURE — 3008F BODY MASS INDEX DOCD: CPT | Performed by: FAMILY MEDICINE

## 2021-03-08 PROCEDURE — 99214 OFFICE O/P EST MOD 30 MIN: CPT | Performed by: FAMILY MEDICINE

## 2021-03-08 PROCEDURE — 3079F DIAST BP 80-89 MM HG: CPT | Performed by: FAMILY MEDICINE

## 2021-03-08 RX ORDER — ATORVASTATIN CALCIUM 10 MG/1
10 TABLET, FILM COATED ORAL NIGHTLY
Qty: 90 TABLET | Refills: 1 | Status: SHIPPED | OUTPATIENT
Start: 2021-03-08

## 2021-03-08 NOTE — PROGRESS NOTES
HPI/Subjective: Jc Arreguin is a 50year old male who presents for Follow - Up and Musculoskeletal Problem (left thigh )   Patient's presenting for diabetes check. Patient has been compliant with medication and diet.   Patient's last OPTHO exam was tablet by mouth daily.  1 tablet 0   • Insulin Pen Needle (BD PEN NEEDLE MINI U/F) 31G X 5 MM Does not apply Misc USE DAILY WITH LANTUS AS DIRECTED 100 each 1   • CONTOUR NEXT TEST In Vitro Strip USE THREE TIMES DAILY 300 strip 0   • Blood Glucose Monitorin thigh numbness    Objective:   /82   Pulse 88   Temp 97.3 °F (36.3 °C) (Temporal)   Resp 16   Ht 5' 11\" (1.803 m)   Wt 183 lb (83 kg)   SpO2 97%   BMI 25.52 kg/m²  Estimated body mass index is 25.52 kg/m² as calculated from the following:    Height METABOLIC PANEL (14); Future  - LIPID PANEL; Future  - TSH W REFLEX TO FREE T4; Future    3. Pure hypercholesterolemia with target low density lipoprotein (LDL) cholesterol less than 130 mg/dL  -stable, CPM  - atorvastatin 10 MG Oral Tab;  Take 1 tablet (10

## 2021-03-09 ENCOUNTER — TELEPHONE (OUTPATIENT)
Dept: FAMILY MEDICINE CLINIC | Facility: CLINIC | Age: 46
End: 2021-03-09

## 2021-03-09 NOTE — TELEPHONE ENCOUNTER
Received fax from [facility] that LIDOCAINE 5% TOPICAL only comes in ointment (not gel), OK to change? Please advise, thank you!

## 2021-03-10 ENCOUNTER — TELEPHONE (OUTPATIENT)
Dept: FAMILY MEDICINE CLINIC | Facility: CLINIC | Age: 46
End: 2021-03-10

## 2021-03-10 NOTE — TELEPHONE ENCOUNTER
Pt states that pharmacy told him Lidocaine 0.5 % External Gel   Isn't covered by his insurance. Pt requesting an alternative.

## 2021-03-10 NOTE — TELEPHONE ENCOUNTER
Patient called regarding a nerve pain medication in feet and legs but he doesn't remember the name of it.  He would like it sent to     [address], [address] [place] [address] [place], [phone], [phone]    Please A

## 2021-03-10 NOTE — TELEPHONE ENCOUNTER
Called [facility] with below change. Also stated if the med is not covered at all then direct patient to look for the Salonpas OTC.

## 2021-03-11 NOTE — TELEPHONE ENCOUNTER
Called pt and he is requesting for a \"nerve medication\". Pt states Dr. Brenton Kirk has previously prescribed this for him before but is unsure of the name.  Saw that Gabapentin 300 MG Oral Capsule TAKE 1 CAPSULE(300 MG) BY MOUTH  THREE TIMES DAILY was previou

## 2021-03-13 RX ORDER — GABAPENTIN 100 MG/1
100 CAPSULE ORAL 2 TIMES DAILY
Qty: 60 CAPSULE | Refills: 3 | Status: SHIPPED | OUTPATIENT
Start: 2021-03-13 | End: 2021-04-08

## 2021-03-15 DIAGNOSIS — E11.65 UNCONTROLLED TYPE 2 DIABETES MELLITUS WITH HYPERGLYCEMIA (HCC): ICD-10-CM

## 2021-03-15 RX ORDER — LOSARTAN POTASSIUM 50 MG/1
50 TABLET ORAL DAILY
Qty: 90 TABLET | Refills: 1 | Status: SHIPPED | OUTPATIENT
Start: 2021-03-15 | End: 2021-09-10

## 2021-03-15 NOTE — TELEPHONE ENCOUNTER
Called pt and left vm to call office when available. Office number provided. Awaiting call back from pt.

## 2021-04-02 ENCOUNTER — LAB ENCOUNTER (OUTPATIENT)
Dept: LAB | Age: 46
End: 2021-04-02
Attending: FAMILY MEDICINE
Payer: COMMERCIAL

## 2021-04-02 ENCOUNTER — OFFICE VISIT (OUTPATIENT)
Dept: FAMILY MEDICINE CLINIC | Facility: CLINIC | Age: 46
End: 2021-04-02

## 2021-04-02 VITALS
OXYGEN SATURATION: 98 % | HEART RATE: 84 BPM | RESPIRATION RATE: 16 BRPM | HEIGHT: 71 IN | SYSTOLIC BLOOD PRESSURE: 132 MMHG | DIASTOLIC BLOOD PRESSURE: 80 MMHG | BODY MASS INDEX: 25.48 KG/M2 | WEIGHT: 182 LBS | TEMPERATURE: 97 F

## 2021-04-02 DIAGNOSIS — G57.92 NEUROPATHY OF LEFT LOWER EXTREMITY: ICD-10-CM

## 2021-04-02 DIAGNOSIS — E78.00 PURE HYPERCHOLESTEROLEMIA WITH TARGET LOW DENSITY LIPOPROTEIN (LDL) CHOLESTEROL LESS THAN 130 MG/DL: ICD-10-CM

## 2021-04-02 DIAGNOSIS — E11.65 UNCONTROLLED TYPE 2 DIABETES MELLITUS WITH HYPERGLYCEMIA (HCC): ICD-10-CM

## 2021-04-02 DIAGNOSIS — G57.92 NEUROPATHY OF LEFT LOWER EXTREMITY: Primary | ICD-10-CM

## 2021-04-02 PROCEDURE — 99214 OFFICE O/P EST MOD 30 MIN: CPT | Performed by: FAMILY MEDICINE

## 2021-04-02 PROCEDURE — 36415 COLL VENOUS BLD VENIPUNCTURE: CPT

## 2021-04-02 PROCEDURE — 3008F BODY MASS INDEX DOCD: CPT | Performed by: FAMILY MEDICINE

## 2021-04-02 PROCEDURE — 85652 RBC SED RATE AUTOMATED: CPT

## 2021-04-02 PROCEDURE — 3075F SYST BP GE 130 - 139MM HG: CPT | Performed by: FAMILY MEDICINE

## 2021-04-02 PROCEDURE — 84443 ASSAY THYROID STIM HORMONE: CPT

## 2021-04-02 PROCEDURE — 82746 ASSAY OF FOLIC ACID SERUM: CPT

## 2021-04-02 PROCEDURE — 84439 ASSAY OF FREE THYROXINE: CPT

## 2021-04-02 PROCEDURE — 3079F DIAST BP 80-89 MM HG: CPT | Performed by: FAMILY MEDICINE

## 2021-04-02 PROCEDURE — 82607 VITAMIN B-12: CPT

## 2021-04-05 PROBLEM — E11.65 UNCONTROLLED TYPE 2 DIABETES MELLITUS WITH HYPERGLYCEMIA (HCC): Status: ACTIVE | Noted: 2021-04-05

## 2021-04-05 NOTE — PROGRESS NOTES
HPI/Subjective: Roshni Turner is a 50year old male who presents for Follow - Up   Patient's presenting for diabetes check. Patient has been compliant with medication and diet. Patient's last OPTHO exam was less than 1 year ago.   He reports no foot • insulin glargine (BASAGLAR KWIKPEN) 100 UNIT/ML Subcutaneous Solution Pen-injector Inject 10 Units into the skin nightly.  Increase 2 units every other day unitil seen by DM clinic MAX DOSE 25 UNITS 3 pen 0   • Blood Glucose Monitoring Suppl (CONTOUR NE nervous/anxious. All other systems reviewed and are negative.     Left thigh numbness    Objective:   /80   Pulse 84   Temp 97.3 °F (36.3 °C) (Temporal)   Resp 16   Ht 5' 11\" (1.803 m)   Wt 182 lb (82.6 kg)   SpO2 98%   BMI 25.38 kg/m²  Estimated cholesterol less than 130 mg/dL  -stable, improved diet and compliance    3. Uncontrolled type 2 diabetes mellitus with hyperglycemia (HCC)  -as above, improved. Labs in 6-8 weeks    Follow up in 3 months, sooner prn.    Reinforced healthy diet, lifestyle,

## 2021-04-07 ENCOUNTER — TELEPHONE (OUTPATIENT)
Dept: FAMILY MEDICINE CLINIC | Facility: CLINIC | Age: 46
End: 2021-04-07

## 2021-04-07 NOTE — TELEPHONE ENCOUNTER
Notified the patient of the below lab results. Patient verbalized understanding. Answered all questions at this time.

## 2021-04-08 ENCOUNTER — OFFICE VISIT (OUTPATIENT)
Dept: NEUROLOGY | Facility: CLINIC | Age: 46
End: 2021-04-08

## 2021-04-08 VITALS — RESPIRATION RATE: 16 BRPM | SYSTOLIC BLOOD PRESSURE: 126 MMHG | HEART RATE: 96 BPM | DIASTOLIC BLOOD PRESSURE: 68 MMHG

## 2021-04-08 DIAGNOSIS — R20.2 PARESTHESIA: Primary | ICD-10-CM

## 2021-04-08 PROCEDURE — 3078F DIAST BP <80 MM HG: CPT | Performed by: OTHER

## 2021-04-08 PROCEDURE — 99244 OFF/OP CNSLTJ NEW/EST MOD 40: CPT | Performed by: OTHER

## 2021-04-08 PROCEDURE — 3074F SYST BP LT 130 MM HG: CPT | Performed by: OTHER

## 2021-04-08 RX ORDER — GABAPENTIN 100 MG/1
200 CAPSULE ORAL 3 TIMES DAILY
Qty: 180 CAPSULE | Refills: 1 | Status: SHIPPED | OUTPATIENT
Start: 2021-04-08 | End: 2021-06-16

## 2021-04-08 NOTE — PROGRESS NOTES
[facility]    Date of consult: 4/8/2021    CC/Reason for consult: left thigh dysesthesia    Consult Requested by  Jamilah Mendosa MD    HPI: Juan A Olivera is a 50year old male with past medical history as listed below presents THREE TIMES DAILY, Disp: 300 strip, Rfl: 0  •  Insulin Syringe 31G X 5/16\" 1 ML Does not apply Misc, Use as directed, Disp: 100 each, Rfl: 0  •  aspirin (ASPIR-81) 81 MG Oral Tab EC, Take 1 tablet by mouth daily. , Disp: 1 tablet, Rfl: 0  •  atorvastatin 1 5/5 all extremities  Tone: normal  DTRs: 2+ symmetric  Plantar response: bilateral flexor  Coordination: Normal FTN  Sensory: symmetric to PP and LT  Gait: nl  Romberg: nl    Data Reviewed on 4/8/2021  Notes Reviewed on 4/8/2021  Labs Reviewed  on 4/8/2021

## 2021-04-09 DIAGNOSIS — Z23 NEED FOR VACCINATION: ICD-10-CM

## 2021-04-19 ENCOUNTER — TELEPHONE (OUTPATIENT)
Dept: FAMILY MEDICINE CLINIC | Facility: CLINIC | Age: 46
End: 2021-04-19

## 2021-04-19 DIAGNOSIS — E11.65 UNCONTROLLED TYPE 2 DIABETES MELLITUS WITH HYPERGLYCEMIA (HCC): Primary | ICD-10-CM

## 2021-04-19 NOTE — TELEPHONE ENCOUNTER
Looking to get a new referral for his opthamologist that he usually sees     He stated that the referral had

## 2021-04-23 ENCOUNTER — TELEMEDICINE (OUTPATIENT)
Dept: FAMILY MEDICINE CLINIC | Facility: CLINIC | Age: 46
End: 2021-04-23

## 2021-04-23 DIAGNOSIS — E78.00 PURE HYPERCHOLESTEROLEMIA WITH TARGET LOW DENSITY LIPOPROTEIN (LDL) CHOLESTEROL LESS THAN 130 MG/DL: ICD-10-CM

## 2021-04-23 DIAGNOSIS — G61.89 OTHER INFLAMMATORY POLYNEUROPATHIES (HCC): ICD-10-CM

## 2021-04-23 DIAGNOSIS — E11.65 UNCONTROLLED TYPE 2 DIABETES MELLITUS WITH HYPERGLYCEMIA (HCC): Primary | ICD-10-CM

## 2021-04-23 DIAGNOSIS — J30.2 SEASONAL ALLERGIES: ICD-10-CM

## 2021-04-23 PROCEDURE — 99214 OFFICE O/P EST MOD 30 MIN: CPT | Performed by: FAMILY MEDICINE

## 2021-04-24 RX ORDER — AMOXICILLIN AND CLAVULANATE POTASSIUM 875; 125 MG/1; MG/1
1 TABLET, FILM COATED ORAL 2 TIMES DAILY
Qty: 14 TABLET | Refills: 0 | Status: SHIPPED | OUTPATIENT
Start: 2021-04-24 | End: 2021-05-01

## 2021-04-25 NOTE — PROGRESS NOTES
HPI/Subjective: Annalisa Mcintosh is a 50year old male who presents for Follow - Up (peripheral neuropathy)     Presenting for follow up on peripheral neuropathy. Patient's sugars are improving. He's been compliant with diabetes.    He reports left upp TIMES DAILY 300 strip 0   • Glucose Blood In Vitro Strip TEST THREE TIMES DAILY 300 strip 0   • Insulin Syringe 31G X 5/16\" 1 ML Does not apply Misc Use as directed 100 each 0   • aspirin (ASPIR-81) 81 MG Oral Tab EC Take 1 tablet by mouth daily.  1 tablet

## 2021-05-20 ENCOUNTER — PROCEDURE VISIT (OUTPATIENT)
Dept: NEUROLOGY | Facility: CLINIC | Age: 46
End: 2021-05-20

## 2021-05-20 DIAGNOSIS — G62.9 PERIPHERAL POLYNEUROPATHY: ICD-10-CM

## 2021-05-20 PROCEDURE — 95886 MUSC TEST DONE W/N TEST COMP: CPT | Performed by: OTHER

## 2021-05-20 PROCEDURE — 99214 OFFICE O/P EST MOD 30 MIN: CPT | Performed by: OTHER

## 2021-05-20 PROCEDURE — 95909 NRV CNDJ TST 5-6 STUDIES: CPT | Performed by: OTHER

## 2021-05-20 NOTE — PROCEDURES
Date of service: 5/20/2021    Procedure: Nerve Conduction Study and Electromyography - complete EMG study in bilateral lower extremities. History: Electrodiagnostic testing on this 48year old male was performed in bilateral lower extremities.  The patie recorded. Impression:  1. Abnormal nerve conduction study. 2. There is electrophysiologic evidence of a moderate to severe length-dependent sensorimotor large fiber polyneuropathy in bilateral lower extremities.   3. There is no electrophysiologic evide

## 2021-05-20 NOTE — PROGRESS NOTES
[facility] Neurology Outpatient Progress Note  Date of service: 5/20/2021    Patient here for a follow-up visit for left thigh paresthesia.  Since last visit symptoms improved slightly with medication, feels good today, took some tylenol as well;  he is here for E Disp: 90 mL, Rfl: 0  •  CONTOUR NEXT TEST In Vitro Strip, USE THREE TIMES DAILY, Disp: 300 strip, Rfl: 0  •  Blood Glucose Monitoring Suppl (CONTOUR NEXT EZ) w/Device Does not apply Kit, 1 kit by Does not apply route daily. , Disp: 1 kit, Rfl: 0  •  Gemma Right the adverse and side effects of the medications. RTC 3-4 months  During today's visit for procedure, I spent additional 25 minutes in neuro exam, counseling and review of care plan .   The above stated time was for the E/M portion of the patient's conditio

## 2021-06-07 RX ORDER — INSULIN GLARGINE 100 [IU]/ML
INJECTION, SOLUTION SUBCUTANEOUS
Qty: 90 ML | Refills: 0 | Status: SHIPPED | OUTPATIENT
Start: 2021-06-07

## 2021-06-07 NOTE — TELEPHONE ENCOUNTER
Medication(s) to Refill:   Requested Prescriptions     Pending Prescriptions Disp Refills   • insulin glargine (LANTUS SOLOSTAR) 100 UNIT/ML Subcutaneous Solution Pen-injector [Pharmacy Med Name: LANTUS SOLOSTAR PEN INJ 3ML] 90 mL 0     Sig: INJECT 25 UNIT

## 2021-06-16 DIAGNOSIS — G62.9 PERIPHERAL POLYNEUROPATHY: Primary | ICD-10-CM

## 2021-06-16 RX ORDER — GABAPENTIN 100 MG/1
200 CAPSULE ORAL 3 TIMES DAILY
Qty: 180 CAPSULE | Refills: 1 | Status: SHIPPED | OUTPATIENT
Start: 2021-06-16 | End: 2021-09-23

## 2021-06-16 NOTE — TELEPHONE ENCOUNTER
Medication: gabapentin 100 MG Oral Cap    Date of last refill: 4/8/2021 (#180/1)  [place]    Last office visit: 4/8/2021  Due back to clinic per last office note:  Not specified  Date next office visit scheduled:

## 2021-07-19 ENCOUNTER — LAB ENCOUNTER (OUTPATIENT)
Dept: LAB | Age: 46
End: 2021-07-19
Attending: FAMILY MEDICINE
Payer: COMMERCIAL

## 2021-07-19 ENCOUNTER — OFFICE VISIT (OUTPATIENT)
Dept: FAMILY MEDICINE CLINIC | Facility: CLINIC | Age: 46
End: 2021-07-19

## 2021-07-19 VITALS
DIASTOLIC BLOOD PRESSURE: 86 MMHG | SYSTOLIC BLOOD PRESSURE: 128 MMHG | WEIGHT: 180 LBS | OXYGEN SATURATION: 98 % | HEIGHT: 71 IN | HEART RATE: 85 BPM | BODY MASS INDEX: 25.2 KG/M2 | RESPIRATION RATE: 16 BRPM | TEMPERATURE: 97 F

## 2021-07-19 DIAGNOSIS — E11.65 UNCONTROLLED TYPE 2 DIABETES MELLITUS WITH HYPERGLYCEMIA (HCC): ICD-10-CM

## 2021-07-19 DIAGNOSIS — Z12.5 PROSTATE CANCER SCREENING: ICD-10-CM

## 2021-07-19 DIAGNOSIS — Z13.29 SCREENING FOR ENDOCRINE, NUTRITIONAL, METABOLIC AND IMMUNITY DISORDER: ICD-10-CM

## 2021-07-19 DIAGNOSIS — Z13.0 SCREENING FOR ENDOCRINE, NUTRITIONAL, METABOLIC AND IMMUNITY DISORDER: ICD-10-CM

## 2021-07-19 DIAGNOSIS — Z13.228 SCREENING FOR ENDOCRINE, NUTRITIONAL, METABOLIC AND IMMUNITY DISORDER: ICD-10-CM

## 2021-07-19 DIAGNOSIS — R10.815 PERIUMBILICAL ABDOMINAL TENDERNESS WITHOUT REBOUND TENDERNESS: ICD-10-CM

## 2021-07-19 DIAGNOSIS — Z13.21 SCREENING FOR ENDOCRINE, NUTRITIONAL, METABOLIC AND IMMUNITY DISORDER: ICD-10-CM

## 2021-07-19 DIAGNOSIS — G57.92 NEUROPATHY OF LEFT LOWER EXTREMITY: Primary | ICD-10-CM

## 2021-07-19 DIAGNOSIS — G61.89 OTHER INFLAMMATORY POLYNEUROPATHIES (HCC): ICD-10-CM

## 2021-07-19 DIAGNOSIS — Z12.11 COLON CANCER SCREENING: ICD-10-CM

## 2021-07-19 LAB
ALBUMIN SERPL-MCNC: 4 G/DL (ref 3.4–5)
ALBUMIN/GLOB SERPL: 1.1 {RATIO} (ref 1–2)
ALP LIVER SERPL-CCNC: 54 U/L
ALT SERPL-CCNC: 23 U/L
ANION GAP SERPL CALC-SCNC: 6 MMOL/L (ref 0–18)
AST SERPL-CCNC: 15 U/L (ref 15–37)
BASOPHILS # BLD AUTO: 0.04 X10(3) UL (ref 0–0.2)
BASOPHILS NFR BLD AUTO: 0.5 %
BILIRUB SERPL-MCNC: 0.6 MG/DL (ref 0.1–2)
BUN BLD-MCNC: 7 MG/DL (ref 7–18)
BUN/CREAT SERPL: 10 (ref 10–20)
CALCIUM BLD-MCNC: 9 MG/DL (ref 8.5–10.1)
CHLORIDE SERPL-SCNC: 106 MMOL/L (ref 98–112)
CHOLEST SMN-MCNC: 145 MG/DL (ref ?–200)
CO2 SERPL-SCNC: 29 MMOL/L (ref 21–32)
COMPLEXED PSA SERPL-MCNC: 0.36 NG/ML (ref ?–4)
CREAT BLD-MCNC: 0.7 MG/DL
DEPRECATED RDW RBC AUTO: 41.6 FL (ref 35.1–46.3)
EOSINOPHIL # BLD AUTO: 0.15 X10(3) UL (ref 0–0.7)
EOSINOPHIL NFR BLD AUTO: 2 %
ERYTHROCYTE [DISTWIDTH] IN BLOOD BY AUTOMATED COUNT: 13.3 % (ref 11–15)
EST. AVERAGE GLUCOSE BLD GHB EST-MCNC: 206 MG/DL (ref 68–126)
GLOBULIN PLAS-MCNC: 3.8 G/DL (ref 2.8–4.4)
GLUCOSE BLD-MCNC: 136 MG/DL (ref 70–99)
HBA1C MFR BLD HPLC: 8.8 % (ref ?–5.7)
HCT VFR BLD AUTO: 41 %
HDLC SERPL-MCNC: 48 MG/DL (ref 40–59)
HGB BLD-MCNC: 14.5 G/DL
IMM GRANULOCYTES # BLD AUTO: 0.02 X10(3) UL (ref 0–1)
IMM GRANULOCYTES NFR BLD: 0.3 %
LDLC SERPL CALC-MCNC: 86 MG/DL (ref ?–100)
LIPASE SERPL-CCNC: 36 U/L (ref 73–393)
LYMPHOCYTES # BLD AUTO: 2.05 X10(3) UL (ref 1–4)
LYMPHOCYTES NFR BLD AUTO: 27.4 %
M PROTEIN MFR SERPL ELPH: 7.8 G/DL (ref 6.4–8.2)
MCH RBC QN AUTO: 30.2 PG (ref 26–34)
MCHC RBC AUTO-ENTMCNC: 35.4 G/DL (ref 31–37)
MCV RBC AUTO: 85.4 FL
MONOCYTES # BLD AUTO: 0.44 X10(3) UL (ref 0.1–1)
MONOCYTES NFR BLD AUTO: 5.9 %
NEUTROPHILS # BLD AUTO: 4.78 X10 (3) UL (ref 1.5–7.7)
NEUTROPHILS # BLD AUTO: 4.78 X10(3) UL (ref 1.5–7.7)
NEUTROPHILS NFR BLD AUTO: 63.9 %
NONHDLC SERPL-MCNC: 97 MG/DL (ref ?–130)
OSMOLALITY SERPL CALC.SUM OF ELEC: 292 MOSM/KG (ref 275–295)
PATIENT FASTING Y/N/NP: YES
PATIENT FASTING Y/N/NP: YES
PLATELET # BLD AUTO: 199 10(3)UL (ref 150–450)
POTASSIUM SERPL-SCNC: 3.7 MMOL/L (ref 3.5–5.1)
RBC # BLD AUTO: 4.8 X10(6)UL
SED RATE-ML: 10 MM/HR
SODIUM SERPL-SCNC: 141 MMOL/L (ref 136–145)
TRIGL SERPL-MCNC: 49 MG/DL (ref 30–149)
TSI SER-ACNC: 1.36 MIU/ML (ref 0.36–3.74)
VLDLC SERPL CALC-MCNC: 8 MG/DL (ref 0–30)
WBC # BLD AUTO: 7.5 X10(3) UL (ref 4–11)

## 2021-07-19 PROCEDURE — 86038 ANTINUCLEAR ANTIBODIES: CPT

## 2021-07-19 PROCEDURE — 3052F HG A1C>EQUAL 8.0%<EQUAL 9.0%: CPT | Performed by: FAMILY MEDICINE

## 2021-07-19 PROCEDURE — 3008F BODY MASS INDEX DOCD: CPT | Performed by: FAMILY MEDICINE

## 2021-07-19 PROCEDURE — 85025 COMPLETE CBC W/AUTO DIFF WBC: CPT

## 2021-07-19 PROCEDURE — 99214 OFFICE O/P EST MOD 30 MIN: CPT | Performed by: FAMILY MEDICINE

## 2021-07-19 PROCEDURE — 80061 LIPID PANEL: CPT

## 2021-07-19 PROCEDURE — 83690 ASSAY OF LIPASE: CPT

## 2021-07-19 PROCEDURE — 3074F SYST BP LT 130 MM HG: CPT | Performed by: FAMILY MEDICINE

## 2021-07-19 PROCEDURE — 36415 COLL VENOUS BLD VENIPUNCTURE: CPT

## 2021-07-19 PROCEDURE — 85652 RBC SED RATE AUTOMATED: CPT

## 2021-07-19 PROCEDURE — 80053 COMPREHEN METABOLIC PANEL: CPT

## 2021-07-19 PROCEDURE — 84443 ASSAY THYROID STIM HORMONE: CPT

## 2021-07-19 PROCEDURE — 83036 HEMOGLOBIN GLYCOSYLATED A1C: CPT

## 2021-07-19 PROCEDURE — 3079F DIAST BP 80-89 MM HG: CPT | Performed by: FAMILY MEDICINE

## 2021-07-19 NOTE — PATIENT INSTRUCTIONS
1. Complete blood work    2. Complete imaging     3. Follow soft bland diet for this week; can take prilosec or pepcid over the counter if having any reflux symptoms.

## 2021-07-19 NOTE — PROGRESS NOTES
[facility] Family Medicine Office Note  Chief Complaint:   Patient presents with:  Abdominal Pain: c/o LQ abdominal pain x couple of months. Pt denies diarrheas / vomit. Pt states appetite has discreased.    Leg Pain: follow up LT leg pain discuss daily. 180 capsule 1   • insulin glargine (LANTUS SOLOSTAR) 100 UNIT/ML Subcutaneous Solution Pen-injector INJECT 25 UNITS UNDER THE SKIN EVERY NIGHT 90 mL 0   • METFORMIN HCL 1000 MG Oral Tab TAKE 1 TABLET(1000 MG) BY MOUTH TWICE DAILY 180 tablet 0   • cy encounter: 180 lb (81.6 kg). Vital signs reviewed. Appears stated age, well groomed. Physical Exam  Vitals and nursing note reviewed. Constitutional:       Appearance: Normal appearance. HENT:      Head: Normocephalic and atraumatic.    Cardiovascular Needs better control. Counseled on diabetic diet, regular exercise. Continue metformin, lantus - will adjust medication based on A1c. Follow up with optho for eye appt. - OPHTHALMOLOGY - INTERNAL    5.  Prostate cancer screening  - PSA SCREEN; Future

## 2021-07-20 ENCOUNTER — TELEPHONE (OUTPATIENT)
Dept: FAMILY MEDICINE CLINIC | Facility: CLINIC | Age: 46
End: 2021-07-20

## 2021-07-20 ENCOUNTER — PATIENT MESSAGE (OUTPATIENT)
Dept: FAMILY MEDICINE CLINIC | Facility: CLINIC | Age: 46
End: 2021-07-20

## 2021-07-20 DIAGNOSIS — E11.65 UNCONTROLLED TYPE 2 DIABETES MELLITUS WITH HYPERGLYCEMIA (HCC): ICD-10-CM

## 2021-07-20 DIAGNOSIS — E78.00 PURE HYPERCHOLESTEROLEMIA WITH TARGET LOW DENSITY LIPOPROTEIN (LDL) CHOLESTEROL LESS THAN 130 MG/DL: Primary | ICD-10-CM

## 2021-07-20 NOTE — TELEPHONE ENCOUNTER
----- Message from CONNOR Glez FNP-C sent at 7/20/2021  2:42 PM CDT -----  DM improving. Continue current medications. Repeat labs in 3 months.

## 2021-07-21 ENCOUNTER — TELEPHONE (OUTPATIENT)
Dept: FAMILY MEDICINE CLINIC | Facility: CLINIC | Age: 46
End: 2021-07-21

## 2021-07-21 LAB — ANA SCREEN: NEGATIVE

## 2021-07-21 NOTE — TELEPHONE ENCOUNTER
----- Message from Donna Oro MD sent at 7/21/2021  8:24 AM CDT -----  PSA normal. Lipase levels mildly low, unclear of significance however in the setting of periumbilical abdominal pain lets complete CT abdomen for further evaluation.

## 2021-07-21 NOTE — TELEPHONE ENCOUNTER
From: Ana Francis  To: Pa Umaña MD  Sent: 7/20/2021 11:47 PM CDT  Subject: Test Results Question    What is my glucose number ?

## 2021-07-22 ENCOUNTER — HOSPITAL ENCOUNTER (OUTPATIENT)
Dept: GENERAL RADIOLOGY | Age: 46
Discharge: HOME | End: 2021-07-22
Attending: FAMILY MEDICINE
Payer: COMMERCIAL

## 2021-07-22 ENCOUNTER — TELEPHONE (OUTPATIENT)
Dept: FAMILY MEDICINE CLINIC | Facility: CLINIC | Age: 46
End: 2021-07-22

## 2021-07-22 DIAGNOSIS — G57.92 NEUROPATHY OF LEFT LOWER EXTREMITY: ICD-10-CM

## 2021-07-22 DIAGNOSIS — G57.92 NEUROPATHY OF LEFT LOWER EXTREMITY: Primary | ICD-10-CM

## 2021-07-22 PROCEDURE — 73502 X-RAY EXAM HIP UNI 2-3 VIEWS: CPT | Performed by: FAMILY MEDICINE

## 2021-07-22 PROCEDURE — 72110 X-RAY EXAM L-2 SPINE 4/>VWS: CPT | Performed by: FAMILY MEDICINE

## 2021-07-22 PROCEDURE — 73552 X-RAY EXAM OF FEMUR 2/>: CPT | Performed by: FAMILY MEDICINE

## 2021-07-22 NOTE — TELEPHONE ENCOUNTER
----- Message from CONNOR Knapp, ZACHARY-MAURICE sent at 7/22/2021  7:40 AM CDT -----  Negative CHRISTIN

## 2021-07-22 NOTE — TELEPHONE ENCOUNTER
Patient has not read [employer] message. Called patient and notified him of xray results and orders. Patient verbalized understanding. Patient states that he will call central scheduling to schedule the MRI. Answered all questions at this time.

## 2021-07-22 NOTE — TELEPHONE ENCOUNTER
Pt made aware of results below by Dr. Delfina Morales. Order sent to Dr. Delfina Morales for review.

## 2021-07-22 NOTE — TELEPHONE ENCOUNTER
----- Message from Isacc Stiles MD sent at 7/22/2021  2:28 PM CDT -----  Possibly hip impingement - would proceed with MRI of hip w/o contrast.

## 2021-08-06 ENCOUNTER — TELEPHONE (OUTPATIENT)
Dept: FAMILY MEDICINE CLINIC | Facility: CLINIC | Age: 46
End: 2021-08-06

## 2021-08-06 DIAGNOSIS — S73.199A TEAR OF ACETABULAR LABRUM, UNSPECIFIED LATERALITY, INITIAL ENCOUNTER: Primary | ICD-10-CM

## 2021-08-06 DIAGNOSIS — Z87.19 HISTORY OF UMBILICAL HERNIA: Primary | ICD-10-CM

## 2021-08-06 DIAGNOSIS — R10.815 PERIUMBILICAL ABDOMINAL TENDERNESS WITHOUT REBOUND TENDERNESS: ICD-10-CM

## 2021-08-06 NOTE — TELEPHONE ENCOUNTER
Surgical referral placed. Notified the patient of the below CT results and recommendation. Patient verbalized understanding. Provided contact information for Dr. Christine Rivas. Answered all questions at this time.

## 2021-08-06 NOTE — TELEPHONE ENCOUNTER
----- Message from Kelsie Purvis MD sent at 8/5/2021 12:10 PM CDT -----  Results reviewed. Please refer to [facility] for evaluation of labral tear and impingement syndrome which is likely cause of decreased ROM and weakness.

## 2021-08-06 NOTE — TELEPHONE ENCOUNTER
----- Message from Edmund Maxwell MD sent at 8/6/2021  4:25 PM CDT -----  No acute findings on CT except for small umbilical hernia  Pain may be from umbilical hernia  Pls refer to Surgery, Dr. Rommel Burgos

## 2021-08-20 ENCOUNTER — OFFICE VISIT (OUTPATIENT)
Dept: FAMILY MEDICINE CLINIC | Facility: CLINIC | Age: 46
End: 2021-08-20

## 2021-08-20 VITALS
BODY MASS INDEX: 25 KG/M2 | SYSTOLIC BLOOD PRESSURE: 124 MMHG | DIASTOLIC BLOOD PRESSURE: 72 MMHG | HEART RATE: 97 BPM | WEIGHT: 180 LBS

## 2021-08-20 DIAGNOSIS — J30.2 SEASONAL ALLERGIES: ICD-10-CM

## 2021-08-20 DIAGNOSIS — E11.65 UNCONTROLLED TYPE 2 DIABETES MELLITUS WITH HYPERGLYCEMIA (HCC): ICD-10-CM

## 2021-08-20 DIAGNOSIS — R53.83 OTHER FATIGUE: ICD-10-CM

## 2021-08-20 DIAGNOSIS — M25.852 LEFT HIP IMPINGEMENT SYNDROME: ICD-10-CM

## 2021-08-20 DIAGNOSIS — S73.192D TEAR OF LEFT ACETABULAR LABRUM, SUBSEQUENT ENCOUNTER: Primary | ICD-10-CM

## 2021-08-20 DIAGNOSIS — E78.00 PURE HYPERCHOLESTEROLEMIA WITH TARGET LOW DENSITY LIPOPROTEIN (LDL) CHOLESTEROL LESS THAN 130 MG/DL: ICD-10-CM

## 2021-08-20 PROCEDURE — 99214 OFFICE O/P EST MOD 30 MIN: CPT | Performed by: FAMILY MEDICINE

## 2021-08-20 PROCEDURE — 3078F DIAST BP <80 MM HG: CPT | Performed by: FAMILY MEDICINE

## 2021-08-20 PROCEDURE — 3074F SYST BP LT 130 MM HG: CPT | Performed by: FAMILY MEDICINE

## 2021-08-20 RX ORDER — INSULIN GLARGINE 100 [IU]/ML
INJECTION, SOLUTION SUBCUTANEOUS
Qty: 3 ML | Refills: 1 | Status: SHIPPED | OUTPATIENT
Start: 2021-08-20

## 2021-08-20 RX ORDER — GABAPENTIN 100 MG/1
CAPSULE ORAL
Qty: 360 CAPSULE | Refills: 1 | Status: SHIPPED | OUTPATIENT
Start: 2021-08-20

## 2021-08-20 RX ORDER — MONTELUKAST SODIUM 10 MG/1
10 TABLET ORAL NIGHTLY
Qty: 90 TABLET | Refills: 1 | Status: SHIPPED | OUTPATIENT
Start: 2021-08-20

## 2021-08-23 ENCOUNTER — LAB ENCOUNTER (OUTPATIENT)
Dept: LAB | Age: 46
End: 2021-08-23
Attending: FAMILY MEDICINE
Payer: COMMERCIAL

## 2021-08-23 DIAGNOSIS — R53.83 OTHER FATIGUE: ICD-10-CM

## 2021-08-23 LAB
FOLATE SERPL-MCNC: 23 NG/ML (ref 8.7–?)
VIT B12 SERPL-MCNC: 492 PG/ML (ref 193–986)

## 2021-08-23 PROCEDURE — 84403 ASSAY OF TOTAL TESTOSTERONE: CPT

## 2021-08-23 PROCEDURE — 82607 VITAMIN B-12: CPT

## 2021-08-23 PROCEDURE — 36415 COLL VENOUS BLD VENIPUNCTURE: CPT

## 2021-08-23 PROCEDURE — 84402 ASSAY OF FREE TESTOSTERONE: CPT

## 2021-08-23 PROCEDURE — 82746 ASSAY OF FOLIC ACID SERUM: CPT

## 2021-08-24 NOTE — PROGRESS NOTES
Patient presents with: Other: follow up on tests, MRI      Antonia Hernandez is a 48year old year old who presents for recheck of diabetes. Pt has been checking feet on a regular basis. Pt denies any tingling of the feet.  Pt denies any sx's of depressio vitals) - 180 lb 180 lb - 182 lb - 183 lb -   BP (enc vitals) - 124/72 128/86 126/68 132/80 - 138/82 -   Last Foot Exam - - - - - - - -   Last Eye Exam - - - - - - - -   Eye Doctor Name - - - - - - - -   Eye Exam Location - - - - - - - -   Eye Exam Retinop DAILY, Disp: 300 strip, Rfl: 0  •  Insulin Syringe 31G X 5/16\" 1 ML Does not apply Misc, Use as directed, Disp: 100 each, Rfl: 0  •  aspirin (ASPIR-81) 81 MG Oral Tab EC, Take 1 tablet by mouth daily. , Disp: 1 tablet, Rfl: 0     Last documented BP: 124/72 for dizziness, syncope, weakness, numbness, tingling and headaches. Hematological: Negative for adenopathy. Does not bruise/bleed easily. Psychiatric/Behavioral: The patient is not nervous/anxious. No depression.     Patient Active Problem List:     Pur aspirin (ASPIR-81) 81 MG Oral Tab EC Take 1 tablet by mouth daily.  1 tablet 0       Past Medical History:   Diagnosis Date   • Type II or unspecified type diabetes mellitus without mention of complication, not stated as uncontrolled      Past Surgical Hist erythema. No pallor. Psychiatric: Normal mood and affect. A/P:    1. Tear of left acetabular labrum, subsequent encounter  -will refer to dr. Chester Diaz, supportive care for   - ORTHOPEDIC - INTERNAL    2.  Uncontrolled type 2 diabetes mellitus with hyp 3-6 months.

## 2021-08-25 ENCOUNTER — TELEPHONE (OUTPATIENT)
Dept: ORTHOPEDICS CLINIC | Facility: CLINIC | Age: 46
End: 2021-08-25

## 2021-08-25 NOTE — TELEPHONE ENCOUNTER
Patient coming in for a Left acetabular labrum tear Patient had an MRI done, can be viewed in Epic. Please place Rx if further imaging needed. Thank you.      Future Appointments   Date Time Provider Lito Watt   8/26/2021  3:15 PM Eveline Posada

## 2021-08-26 ENCOUNTER — OFFICE VISIT (OUTPATIENT)
Dept: SURGERY | Facility: CLINIC | Age: 46
End: 2021-08-26

## 2021-08-26 VITALS
TEMPERATURE: 97 F | HEART RATE: 66 BPM | BODY MASS INDEX: 25.39 KG/M2 | HEIGHT: 71 IN | WEIGHT: 181.38 LBS | SYSTOLIC BLOOD PRESSURE: 120 MMHG | DIASTOLIC BLOOD PRESSURE: 80 MMHG

## 2021-08-26 DIAGNOSIS — E11.65 UNCONTROLLED TYPE 2 DIABETES MELLITUS WITH HYPERGLYCEMIA (HCC): ICD-10-CM

## 2021-08-26 DIAGNOSIS — Z01.818 PRE-OP TESTING: ICD-10-CM

## 2021-08-26 DIAGNOSIS — Z12.11 ENCOUNTER FOR SCREENING COLONOSCOPY: Primary | ICD-10-CM

## 2021-08-26 DIAGNOSIS — K42.9 UMBILICAL HERNIA WITHOUT OBSTRUCTION AND WITHOUT GANGRENE: ICD-10-CM

## 2021-08-26 PROCEDURE — 3074F SYST BP LT 130 MM HG: CPT | Performed by: COLON & RECTAL SURGERY

## 2021-08-26 PROCEDURE — 99244 OFF/OP CNSLTJ NEW/EST MOD 40: CPT | Performed by: COLON & RECTAL SURGERY

## 2021-08-26 PROCEDURE — 3079F DIAST BP 80-89 MM HG: CPT | Performed by: COLON & RECTAL SURGERY

## 2021-08-26 PROCEDURE — 3008F BODY MASS INDEX DOCD: CPT | Performed by: COLON & RECTAL SURGERY

## 2021-08-26 NOTE — H&P
New Patient Visit Note       Active Problems      1. Encounter for screening colonoscopy    2. Umbilical hernia without obstruction and without gangrene    3. Pre-op testing    4.  Uncontrolled type 2 diabetes mellitus with hyperglycemia [facility])        Chief unspecified type diabetes mellitus without mention of complication, not stated as uncontrolled      Past Surgical History:   Procedure Laterality Date   • APPENDECTOMY  10 yrs ago       Family History   Problem Relation Age of Onset   • Diabetes Father fatigue and fever. HENT: Negative for congestion, hearing loss, rhinorrhea and sore throat. Eyes: Negative for visual disturbance. Respiratory: Negative for cough, shortness of breath and wheezing.     Cardiovascular: Negative for chest pain, palpita this measures approximately 12 mm x 9.6 mm. It contains fat. This is asymptomatic at the moment. We will observe this for the time being.     The benefits of colonoscopy, including detection of cancer and polyp removal prior to progression to cancer were

## 2021-08-28 LAB
TESTOSTERONE TOTAL: 555.9 NG/DL
TESTOSTERONE, FREE -MS/MS: 57.4 PG/ML

## 2021-08-31 ENCOUNTER — PATIENT MESSAGE (OUTPATIENT)
Dept: FAMILY MEDICINE CLINIC | Facility: CLINIC | Age: 46
End: 2021-08-31

## 2021-08-31 NOTE — TELEPHONE ENCOUNTER
From: Rachelle Daily  To: Sol Fleming MD  Sent: 8/31/2021 3:50 AM CDT  Subject: Visit Follow-up Question    Hi doctor    What are the results of my blood work? Im still experiencing extreme fatigue and sensitive stomach.

## 2021-08-31 NOTE — TELEPHONE ENCOUNTER
----- Message from Nuha Alonzo MD sent at 8/30/2021  3:33 PM CDT -----  Normal, I believe his fatigue during the day is related to taking gabapentin during the day, if he's able to scale the medications to more in evening or at night, it'll be better.

## 2021-09-10 DIAGNOSIS — E11.65 UNCONTROLLED TYPE 2 DIABETES MELLITUS WITH HYPERGLYCEMIA (HCC): ICD-10-CM

## 2021-09-10 RX ORDER — LOSARTAN POTASSIUM 50 MG/1
TABLET ORAL
Qty: 90 TABLET | Refills: 1 | Status: SHIPPED | OUTPATIENT
Start: 2021-09-10 | End: 2021-10-20

## 2021-09-22 ENCOUNTER — VIRTUAL PHONE E/M (OUTPATIENT)
Dept: FAMILY MEDICINE CLINIC | Facility: CLINIC | Age: 46
End: 2021-09-22

## 2021-09-22 DIAGNOSIS — R53.83 OTHER FATIGUE: ICD-10-CM

## 2021-09-22 DIAGNOSIS — E78.00 PURE HYPERCHOLESTEROLEMIA WITH TARGET LOW DENSITY LIPOPROTEIN (LDL) CHOLESTEROL LESS THAN 130 MG/DL: ICD-10-CM

## 2021-09-22 DIAGNOSIS — E11.65 UNCONTROLLED TYPE 2 DIABETES MELLITUS WITH HYPERGLYCEMIA (HCC): Primary | ICD-10-CM

## 2021-09-22 DIAGNOSIS — E55.9 VITAMIN D DEFICIENCY: ICD-10-CM

## 2021-09-22 DIAGNOSIS — J30.2 SEASONAL ALLERGIES: ICD-10-CM

## 2021-09-22 PROCEDURE — 99214 OFFICE O/P EST MOD 30 MIN: CPT | Performed by: FAMILY MEDICINE

## 2021-09-22 RX ORDER — ERGOCALCIFEROL 1.25 MG/1
50000 CAPSULE ORAL WEEKLY
Qty: 12 CAPSULE | Refills: 0 | Status: SHIPPED | OUTPATIENT
Start: 2021-09-22 | End: 2021-12-14

## 2021-09-22 NOTE — PROGRESS NOTES
TELE HEALTH VISIT     HPI:    Kriss Reyna is a 48year old male who presents for Follow - Up (fatigue and decreased energy)   Presenting via Tele health due to pandemic:       Patient's presenting for diabetes check.   Patient has been compliant wit UNIT/ML Subcutaneous Solution Pen-injector, INJECT 25 UNITS UNDER THE SKIN EVERY NIGHT  cyanocobalamin ([employer] VITAMIN B12) 1000 MCG Oral Tab, Take 1 tablet (1,000 mcg total) by mouth daily.   atorvastatin 10 MG Oral Tab, Take 1 tablet (10 mg total) by mouth n Psychiatric:         Mood and Affect: Mood normal.         Behavior: Behavior normal.         Thought Content: Thought content normal.         Judgment: Judgment normal.              ASSESSMENT AND PLAN:   Diagnoses and all orders for this visit:    1.  U face and more that 50% of this time was spent in counseling and coordination of care.

## 2021-09-23 ENCOUNTER — OFFICE VISIT (OUTPATIENT)
Dept: ORTHOPEDICS CLINIC | Facility: CLINIC | Age: 46
End: 2021-09-23

## 2021-09-23 DIAGNOSIS — M54.16 LUMBAR RADICULOPATHY: ICD-10-CM

## 2021-09-23 DIAGNOSIS — M25.852 LEFT HIP IMPINGEMENT SYNDROME: ICD-10-CM

## 2021-09-23 DIAGNOSIS — S73.192A TEAR OF LEFT ACETABULAR LABRUM, INITIAL ENCOUNTER: ICD-10-CM

## 2021-09-23 DIAGNOSIS — G57.11 MERALGIA PARESTHETICA, RIGHT LOWER LIMB: Primary | ICD-10-CM

## 2021-09-23 PROCEDURE — 99243 OFF/OP CNSLTJ NEW/EST LOW 30: CPT | Performed by: ORTHOPAEDIC SURGERY

## 2021-09-23 NOTE — PROGRESS NOTES
[facility] New Consult    CC: Patient presents with:  Hip Pain: Patient is here today for a left hip labral tear that first started about 3 months ago. Patient doesn't remember a specific event that onset the pain.        HPI: The patient is a 4 (LANTUS SOLOSTAR) 100 UNIT/ML Subcutaneous Solution Pen-injector INJECT 25 UNITS UNDER THE SKIN EVERY NIGHT 90 mL 0   • cyanocobalamin ([employer] VITAMIN B12) 1000 MCG Oral Tab Take 1 tablet (1,000 mcg total) by mouth daily.  30 tablet 3   • atorvastatin 10 MG Or negative. He does however report discomfort and hypersensitivity to light touch in the lateral femoral cutaneous nerve distribution on the left compared to none on the right. Deep tendon reflexes are 0 at the knees and the Achilles bilaterally.   Straight lumbar stretching and gentle hip exercises to work on gluteal, hamstring and quadriceps stretching. Over time I would anticipate improvement in his symptoms and conservative observation is advised.   Although his hip imaging suggests impingement and a labr

## 2021-10-06 RX ORDER — POLYETHYLENE GLYCOL 3350, SODIUM CHLORIDE, SODIUM BICARBONATE, POTASSIUM CHLORIDE 420; 11.2; 5.72; 1.48 G/4L; G/4L; G/4L; G/4L
POWDER, FOR SOLUTION ORAL
Qty: 4000 ML | Refills: 0 | Status: SHIPPED | OUTPATIENT
Start: 2021-10-06 | End: 2021-11-18 | Stop reason: ALTCHOICE

## 2021-10-09 ENCOUNTER — LAB ENCOUNTER (OUTPATIENT)
Dept: LAB | Age: 46
End: 2021-10-09
Attending: COLON & RECTAL SURGERY
Payer: COMMERCIAL

## 2021-10-09 DIAGNOSIS — Z01.818 PRE-OP TESTING: ICD-10-CM

## 2021-10-19 ENCOUNTER — MED REC SCAN ONLY (OUTPATIENT)
Dept: FAMILY MEDICINE CLINIC | Facility: CLINIC | Age: 46
End: 2021-10-19

## 2021-10-19 DIAGNOSIS — E11.65 UNCONTROLLED TYPE 2 DIABETES MELLITUS WITH HYPERGLYCEMIA (HCC): ICD-10-CM

## 2021-10-19 RX ORDER — LOSARTAN POTASSIUM 50 MG/1
TABLET ORAL
Qty: 90 TABLET | Refills: 1 | Status: CANCELLED | OUTPATIENT
Start: 2021-10-19

## 2021-10-20 RX ORDER — LOSARTAN POTASSIUM 50 MG/1
TABLET ORAL
Qty: 90 TABLET | Refills: 1 | Status: SHIPPED | OUTPATIENT
Start: 2021-10-20

## 2021-10-20 NOTE — TELEPHONE ENCOUNTER
Medication(s) to Refill:   Requested Prescriptions     Pending Prescriptions Disp Refills   • LOSARTAN 50 MG Oral Tab [Pharmacy Med Name: LOSARTAN 50MG TABLETS] 90 tablet 1     Sig: TAKE 1 TABLET(50 MG) BY MOUTH DAILY                Last Office Visit with

## 2021-11-18 ENCOUNTER — OFFICE VISIT (OUTPATIENT)
Dept: FAMILY MEDICINE CLINIC | Facility: CLINIC | Age: 46
End: 2021-11-18

## 2021-11-18 VITALS
RESPIRATION RATE: 16 BRPM | DIASTOLIC BLOOD PRESSURE: 78 MMHG | BODY MASS INDEX: 25.02 KG/M2 | WEIGHT: 178.75 LBS | HEART RATE: 102 BPM | OXYGEN SATURATION: 98 % | SYSTOLIC BLOOD PRESSURE: 118 MMHG | HEIGHT: 71 IN

## 2021-11-18 DIAGNOSIS — E11.65 UNCONTROLLED TYPE 2 DIABETES MELLITUS WITH HYPERGLYCEMIA (HCC): ICD-10-CM

## 2021-11-18 DIAGNOSIS — Z00.00 ANNUAL PHYSICAL EXAM: Primary | ICD-10-CM

## 2021-11-18 DIAGNOSIS — E55.9 VITAMIN D DEFICIENCY: ICD-10-CM

## 2021-11-18 DIAGNOSIS — E78.00 PURE HYPERCHOLESTEROLEMIA WITH TARGET LOW DENSITY LIPOPROTEIN (LDL) CHOLESTEROL LESS THAN 130 MG/DL: ICD-10-CM

## 2021-11-18 DIAGNOSIS — S73.192D TEAR OF LEFT ACETABULAR LABRUM, SUBSEQUENT ENCOUNTER: ICD-10-CM

## 2021-11-18 DIAGNOSIS — Z23 NEED FOR VACCINATION: ICD-10-CM

## 2021-11-18 PROCEDURE — 90686 IIV4 VACC NO PRSV 0.5 ML IM: CPT | Performed by: FAMILY MEDICINE

## 2021-11-18 PROCEDURE — 3074F SYST BP LT 130 MM HG: CPT | Performed by: FAMILY MEDICINE

## 2021-11-18 PROCEDURE — 3078F DIAST BP <80 MM HG: CPT | Performed by: FAMILY MEDICINE

## 2021-11-18 PROCEDURE — 99396 PREV VISIT EST AGE 40-64: CPT | Performed by: FAMILY MEDICINE

## 2021-11-18 PROCEDURE — 3008F BODY MASS INDEX DOCD: CPT | Performed by: FAMILY MEDICINE

## 2021-11-18 PROCEDURE — 90471 IMMUNIZATION ADMIN: CPT | Performed by: FAMILY MEDICINE

## 2021-11-21 NOTE — PROGRESS NOTES
Patient presents with:  Physical: physical       Vanessa Maulik is a 48year old year old who presents for recheck of diabetes. Pt has been checking feet on a regular basis. Pt denies any tingling of the feet.  Pt denies any sx's of depression and repor A1C <5.7 % of total Hgb - - - - - - -   Weight (enc vitals) - 178 lb 12 oz - - 181 lb 6.4 oz 180 lb 180 lb -   BP (enc vitals) - 118/78 - - 120/80 124/72 128/86 126/68   Last Foot Exam - - - - - - - -   Last Eye Exam - - - - - - - -   Eye Doctor Name - - Rfl: 0  •  CONTOUR NEXT TEST In Vitro Strip, USE THREE TIMES DAILY, Disp: 300 strip, Rfl: 0  •  Insulin Syringe 31G X 5/16\" 1 ML Does not apply Misc, Use as directed, Disp: 100 each, Rfl: 0  •  aspirin (ASPIR-81) 81 MG Oral Tab EC, Take 1 tablet by mouth Negative for color change and rash. Neurological: Negative for dizziness, syncope, weakness, numbness, tingling and headaches. Hematological: Negative for adenopathy. Does not bruise/bleed easily.    Psychiatric/Behavioral: The patient is not nervous/an Syringe 31G X 5/16\" 1 ML Does not apply Misc Use as directed 100 each 0   • aspirin (ASPIR-81) 81 MG Oral Tab EC Take 1 tablet by mouth daily.  1 tablet 0       Past Medical History:   Diagnosis Date   • Type II or unspecified type diabetes mellitus withou is normal in both feet, no edema, redness or tenderness in the calves or thighs    Skin: Skin is warm and dry. No rash noted. No erythema. No pallor. Psychiatric: Normal mood and affect. A/P:  1. Annual physical exam  -wellness visit was done    2.  P

## 2021-12-13 DIAGNOSIS — E55.9 VITAMIN D DEFICIENCY: ICD-10-CM

## 2021-12-13 NOTE — TELEPHONE ENCOUNTER
Medication(s) to Refill:   Requested Prescriptions     Pending Prescriptions Disp Refills   • ERGOCALCIFEROL 1.25 MG (32000 UT) Oral Cap [Pharmacy Med Name: VITAMIN D2 50,000IU (ERGO) CAP RX] 12 capsule 0     Sig: TAKE 1 CAPSULE BY MOUTH 1 TIME A WEEK

## 2021-12-14 RX ORDER — ERGOCALCIFEROL 1.25 MG/1
CAPSULE ORAL
Qty: 12 CAPSULE | Refills: 0 | Status: SHIPPED | OUTPATIENT
Start: 2021-12-14

## 2022-03-08 ENCOUNTER — TELEPHONE (OUTPATIENT)
Dept: ADMINISTRATIVE | Age: 47
End: 2022-03-08

## 2022-03-08 NOTE — TELEPHONE ENCOUNTER
Ptient called and requested a new referral to see [facility],Please review and sign plan and care if you agree  Thank you. Heather Dejesus V    [facility].

## 2022-03-09 DIAGNOSIS — E11.65 UNCONTROLLED TYPE 2 DIABETES MELLITUS WITH HYPERGLYCEMIA (HCC): ICD-10-CM

## 2022-03-09 RX ORDER — INSULIN GLARGINE-YFGN 100 [IU]/ML
INJECTION, SOLUTION SUBCUTANEOUS
Qty: 3 ML | Refills: 0 | Status: SHIPPED | OUTPATIENT
Start: 2022-03-09 | End: 2022-03-16

## 2022-03-09 RX ORDER — ERGOCALCIFEROL 1.25 MG/1
CAPSULE ORAL
Qty: 12 CAPSULE | Refills: 0 | OUTPATIENT
Start: 2022-03-09

## 2022-03-09 NOTE — TELEPHONE ENCOUNTER
Pt saw Dr Edie Milton yesterday and he recommended pt see retina specialist Dr Marta Foster. Pt requesting referral asap.

## 2022-03-10 RX ORDER — INSULIN GLARGINE-YFGN 100 [IU]/ML
INJECTION, SOLUTION SUBCUTANEOUS
Qty: 3 ML | Refills: 0 | OUTPATIENT
Start: 2022-03-10

## 2022-03-11 ENCOUNTER — TELEPHONE (OUTPATIENT)
Dept: FAMILY MEDICINE CLINIC | Facility: CLINIC | Age: 47
End: 2022-03-11

## 2022-03-14 RX ORDER — ERGOCALCIFEROL 1.25 MG/1
CAPSULE ORAL
Qty: 12 CAPSULE | Refills: 0 | OUTPATIENT
Start: 2022-03-14

## 2022-03-14 RX ORDER — INSULIN GLARGINE-YFGN 100 [IU]/ML
INJECTION, SOLUTION SUBCUTANEOUS
Qty: 3 ML | Refills: 0 | OUTPATIENT
Start: 2022-03-14

## 2022-03-14 NOTE — TELEPHONE ENCOUNTER
Explained to the patient the need for recheck of vitamin D. Patient verbalized understanding. Agrees to recheck lab. Answered all questions at this time.

## 2022-03-14 NOTE — TELEPHONE ENCOUNTER
Explained to pt that an order for vitamin d has been put in and he needs to get done in order for us to fill med. Pt requesting to speak to Dr Franco Hall as he does not agree with this.

## 2022-03-17 RX ORDER — INSULIN GLARGINE-YFGN 100 [IU]/ML
INJECTION, SOLUTION SUBCUTANEOUS
Qty: 30 ML | Refills: 0 | Status: SHIPPED | OUTPATIENT
Start: 2022-03-17

## 2022-03-19 ENCOUNTER — TELEPHONE (OUTPATIENT)
Dept: FAMILY MEDICINE CLINIC | Facility: CLINIC | Age: 47
End: 2022-03-19

## 2022-03-28 RX ORDER — PEN NEEDLE, DIABETIC 31 GX5/16"
NEEDLE, DISPOSABLE MISCELLANEOUS
Qty: 100 EACH | Refills: 0 | Status: SHIPPED | OUTPATIENT
Start: 2022-03-28

## 2022-06-08 NOTE — TELEPHONE ENCOUNTER
Last office visit:  11/18/21      Requested medication:   METFORMIN HCL 1000 MG Oral Tab  Pharmacy:    [address], [address] [place] [address] [place], [phone], [phone]      Patient states the pharmacy said they do not have this prescription, he knows he is overdue for labs and states he will have those done. Thank you.

## 2022-07-06 DIAGNOSIS — E11.65 UNCONTROLLED TYPE 2 DIABETES MELLITUS WITH HYPERGLYCEMIA (HCC): ICD-10-CM

## 2022-07-07 NOTE — TELEPHONE ENCOUNTER
LF:10/20/21  LOV:11/18/21  Pt requesting 30 supply until his next appt.   Future Appointments   Date Time Provider Lito Alysa   7/13/2022  8:00 AM REF SO PFLD REF EMG17 Ref PFLD 17   7/15/2022  2:00 PM Bhavin Vallejo MD EMG 17 EMG [facility]

## 2022-07-08 RX ORDER — LOSARTAN POTASSIUM 50 MG/1
TABLET ORAL
Qty: 30 TABLET | Refills: 0 | Status: SHIPPED | OUTPATIENT
Start: 2022-07-08 | End: 2022-08-07

## 2022-07-15 ENCOUNTER — OFFICE VISIT (OUTPATIENT)
Dept: FAMILY MEDICINE CLINIC | Facility: CLINIC | Age: 47
End: 2022-07-15
Payer: COMMERCIAL

## 2022-07-15 VITALS
HEART RATE: 91 BPM | BODY MASS INDEX: 27.23 KG/M2 | OXYGEN SATURATION: 99 % | DIASTOLIC BLOOD PRESSURE: 84 MMHG | HEIGHT: 71 IN | WEIGHT: 194.5 LBS | SYSTOLIC BLOOD PRESSURE: 136 MMHG | RESPIRATION RATE: 16 BRPM

## 2022-07-15 DIAGNOSIS — Z23 NEED FOR PNEUMOCOCCAL VACCINATION: ICD-10-CM

## 2022-07-15 DIAGNOSIS — E11.65 UNCONTROLLED TYPE 2 DIABETES MELLITUS WITH HYPERGLYCEMIA (HCC): Primary | ICD-10-CM

## 2022-07-15 DIAGNOSIS — E78.00 PURE HYPERCHOLESTEROLEMIA WITH TARGET LOW DENSITY LIPOPROTEIN (LDL) CHOLESTEROL LESS THAN 130 MG/DL: ICD-10-CM

## 2022-07-15 DIAGNOSIS — Z12.5 SCREENING FOR PROSTATE CANCER: ICD-10-CM

## 2022-07-15 PROCEDURE — 3079F DIAST BP 80-89 MM HG: CPT | Performed by: FAMILY MEDICINE

## 2022-07-15 PROCEDURE — 3008F BODY MASS INDEX DOCD: CPT | Performed by: FAMILY MEDICINE

## 2022-07-15 PROCEDURE — 3075F SYST BP GE 130 - 139MM HG: CPT | Performed by: FAMILY MEDICINE

## 2022-07-15 PROCEDURE — 99214 OFFICE O/P EST MOD 30 MIN: CPT | Performed by: FAMILY MEDICINE

## 2022-07-15 PROCEDURE — 90471 IMMUNIZATION ADMIN: CPT | Performed by: FAMILY MEDICINE

## 2022-07-15 PROCEDURE — 90677 PCV20 VACCINE IM: CPT | Performed by: FAMILY MEDICINE

## 2022-07-15 RX ORDER — ATORVASTATIN CALCIUM 10 MG/1
10 TABLET, FILM COATED ORAL NIGHTLY
Qty: 90 TABLET | Refills: 1 | Status: SHIPPED | OUTPATIENT
Start: 2022-07-15

## 2022-07-30 DIAGNOSIS — E11.65 UNCONTROLLED TYPE 2 DIABETES MELLITUS WITH HYPERGLYCEMIA (HCC): ICD-10-CM

## 2022-08-02 RX ORDER — LOSARTAN POTASSIUM 50 MG/1
50 TABLET ORAL DAILY
Qty: 90 TABLET | Refills: 0 | Status: SHIPPED | OUTPATIENT
Start: 2022-08-02 | End: 2022-09-01 | Stop reason: WASHOUT

## 2022-08-25 DIAGNOSIS — E11.65 UNCONTROLLED TYPE 2 DIABETES MELLITUS WITH HYPERGLYCEMIA (HCC): ICD-10-CM

## 2022-08-26 RX ORDER — INSULIN GLARGINE-YFGN 100 [IU]/ML
INJECTION, SOLUTION SUBCUTANEOUS
Qty: 30 ML | Refills: 0 | Status: SHIPPED | OUTPATIENT
Start: 2022-08-26

## 2022-09-01 ENCOUNTER — TELEPHONE (OUTPATIENT)
Dept: FAMILY MEDICINE CLINIC | Facility: CLINIC | Age: 47
End: 2022-09-01

## 2022-09-08 RX ORDER — INSULIN GLARGINE 100 [IU]/ML
INJECTION, SOLUTION SUBCUTANEOUS
Qty: 3 ML | Refills: 0 | Status: SHIPPED | OUTPATIENT
Start: 2022-09-08

## 2022-09-23 ENCOUNTER — LAB ENCOUNTER (OUTPATIENT)
Dept: LAB | Age: 47
End: 2022-09-23
Attending: FAMILY MEDICINE

## 2022-09-23 DIAGNOSIS — E11.65 UNCONTROLLED TYPE 2 DIABETES MELLITUS WITH HYPERGLYCEMIA (HCC): ICD-10-CM

## 2022-09-23 DIAGNOSIS — J30.2 SEASONAL ALLERGIES: ICD-10-CM

## 2022-09-23 DIAGNOSIS — E55.9 VITAMIN D DEFICIENCY: ICD-10-CM

## 2022-09-23 DIAGNOSIS — Z12.5 SCREENING FOR PROSTATE CANCER: ICD-10-CM

## 2022-09-23 LAB
ALBUMIN SERPL-MCNC: 3.9 G/DL (ref 3.4–5)
ALBUMIN/GLOB SERPL: 0.9 {RATIO} (ref 1–2)
ALP LIVER SERPL-CCNC: 77 U/L
ALT SERPL-CCNC: 40 U/L
ANION GAP SERPL CALC-SCNC: 5 MMOL/L (ref 0–18)
AST SERPL-CCNC: 27 U/L (ref 15–37)
BILIRUB SERPL-MCNC: 0.7 MG/DL (ref 0.1–2)
BUN BLD-MCNC: 8 MG/DL (ref 7–18)
CALCIUM BLD-MCNC: 9.4 MG/DL (ref 8.5–10.1)
CHLORIDE SERPL-SCNC: 103 MMOL/L (ref 98–112)
CHOLEST SERPL-MCNC: 164 MG/DL (ref ?–200)
CO2 SERPL-SCNC: 29 MMOL/L (ref 21–32)
COMPLEXED PSA SERPL-MCNC: 0.33 NG/ML (ref ?–4)
CREAT BLD-MCNC: 0.92 MG/DL
CREAT UR-SCNC: 145 MG/DL
EST. AVERAGE GLUCOSE BLD GHB EST-MCNC: 240 MG/DL (ref 68–126)
FASTING PATIENT LIPID ANSWER: YES
FASTING STATUS PATIENT QL REPORTED: YES
GFR SERPLBLD BASED ON 1.73 SQ M-ARVRAT: 102 ML/MIN/1.73M2 (ref 60–?)
GLOBULIN PLAS-MCNC: 4.2 G/DL (ref 2.8–4.4)
GLUCOSE BLD-MCNC: 214 MG/DL (ref 70–99)
HBA1C MFR BLD: 10 % (ref ?–5.7)
HDLC SERPL-MCNC: 47 MG/DL (ref 40–59)
LDLC SERPL CALC-MCNC: 106 MG/DL (ref ?–100)
MICROALBUMIN UR-MCNC: 53.9 MG/DL
MICROALBUMIN/CREAT 24H UR-RTO: 371.7 UG/MG (ref ?–30)
NONHDLC SERPL-MCNC: 117 MG/DL (ref ?–130)
OSMOLALITY SERPL CALC.SUM OF ELEC: 289 MOSM/KG (ref 275–295)
POTASSIUM SERPL-SCNC: 3.9 MMOL/L (ref 3.5–5.1)
PROT SERPL-MCNC: 8.1 G/DL (ref 6.4–8.2)
SODIUM SERPL-SCNC: 137 MMOL/L (ref 136–145)
TRIGL SERPL-MCNC: 56 MG/DL (ref 30–149)
TSI SER-ACNC: 1.71 MIU/ML (ref 0.36–3.74)
VIT D+METAB SERPL-MCNC: 35.5 NG/ML (ref 30–100)
VLDLC SERPL CALC-MCNC: 9 MG/DL (ref 0–30)

## 2022-09-23 PROCEDURE — 80061 LIPID PANEL: CPT

## 2022-09-23 PROCEDURE — 36415 COLL VENOUS BLD VENIPUNCTURE: CPT

## 2022-09-23 PROCEDURE — 82570 ASSAY OF URINE CREATININE: CPT

## 2022-09-23 PROCEDURE — 80053 COMPREHEN METABOLIC PANEL: CPT

## 2022-09-23 PROCEDURE — 82306 VITAMIN D 25 HYDROXY: CPT

## 2022-09-23 PROCEDURE — 84443 ASSAY THYROID STIM HORMONE: CPT

## 2022-09-23 PROCEDURE — 3060F POS MICROALBUMINURIA REV: CPT | Performed by: FAMILY MEDICINE

## 2022-09-23 PROCEDURE — 82043 UR ALBUMIN QUANTITATIVE: CPT

## 2022-09-23 PROCEDURE — 3046F HEMOGLOBIN A1C LEVEL >9.0%: CPT | Performed by: FAMILY MEDICINE

## 2022-09-23 PROCEDURE — 83036 HEMOGLOBIN GLYCOSYLATED A1C: CPT

## 2022-09-23 RX ORDER — MONTELUKAST SODIUM 10 MG/1
TABLET ORAL
Qty: 90 TABLET | Refills: 1 | Status: SHIPPED | OUTPATIENT
Start: 2022-09-23

## 2022-09-23 NOTE — TELEPHONE ENCOUNTER
Requesting refill on montelukast.   LOV 7/15/2022. LF 8/20/2021. Please approve or deny pending rx. Thank you.

## 2022-09-26 ENCOUNTER — TELEPHONE (OUTPATIENT)
Dept: FAMILY MEDICINE CLINIC | Facility: CLINIC | Age: 47
End: 2022-09-26

## 2022-09-26 NOTE — TELEPHONE ENCOUNTER
Patient is out of his Metoformin   He said he goes to the [facility] in [place], [place]    Advised 48-72 hours

## 2022-09-28 ENCOUNTER — TELEPHONE (OUTPATIENT)
Dept: FAMILY MEDICINE CLINIC | Facility: CLINIC | Age: 47
End: 2022-09-28

## 2022-09-28 DIAGNOSIS — E11.65 UNCONTROLLED TYPE 2 DIABETES MELLITUS WITH HYPERGLYCEMIA (HCC): Primary | ICD-10-CM

## 2022-09-28 RX ORDER — LISINOPRIL 2.5 MG/1
2.5 TABLET ORAL DAILY
Qty: 30 TABLET | Refills: 3 | Status: SHIPPED | OUTPATIENT
Start: 2022-09-28

## 2022-09-28 NOTE — TELEPHONE ENCOUNTER
----- Message from Lynne Kong MD sent at 9/28/2022  3:51 PM CDT -----  Worsening sugars, refer to diabetic clinic. Microalbumin is worse, sugars are affecting kidney. I recommend lisinopril 2.5 mg q daily, to protect his kidneys.

## 2022-10-04 ENCOUNTER — TELEPHONE (OUTPATIENT)
Dept: FAMILY MEDICINE CLINIC | Facility: CLINIC | Age: 47
End: 2022-10-04

## 2022-10-05 DIAGNOSIS — E11.65 UNCONTROLLED TYPE 2 DIABETES MELLITUS WITH HYPERGLYCEMIA (HCC): ICD-10-CM

## 2022-10-06 ENCOUNTER — TELEPHONE (OUTPATIENT)
Dept: FAMILY MEDICINE CLINIC | Facility: CLINIC | Age: 47
End: 2022-10-06

## 2022-10-06 DIAGNOSIS — E11.65 UNCONTROLLED TYPE 2 DIABETES MELLITUS WITH HYPERGLYCEMIA (HCC): ICD-10-CM

## 2022-10-06 RX ORDER — PEN NEEDLE, DIABETIC 31 GX5/16"
NEEDLE, DISPOSABLE MISCELLANEOUS
Qty: 100 EACH | Refills: 0 | Status: SHIPPED | OUTPATIENT
Start: 2022-10-06

## 2022-10-06 NOTE — TELEPHONE ENCOUNTER
Pt called requesting clarification that lisinopril 2.5 MG Oral Tab is replacing the LOSARTAN 50 MG Oral Tab.

## 2022-10-06 NOTE — TELEPHONE ENCOUNTER
Sorrick didn't realize he's taking losartan(I must have missed it on his med list), he should only take losartan 50 mg q daily.

## 2022-10-06 NOTE — TELEPHONE ENCOUNTER
Phoned pt to clarify that he should start taking lisinopril 2.5mg and no longer take losartan. I explained that the lisinopril is for kidney protection vs losartan for HTN and that he should be taking both medications. Pt states if he is to continue losartan he needs a refill as well as insulin. LOV 7/15/22  Please advise / modify pended orders.

## 2022-10-07 RX ORDER — INSULIN GLARGINE 100 [IU]/ML
INJECTION, SOLUTION SUBCUTANEOUS
Qty: 3 ML | Refills: 0 | Status: SHIPPED | OUTPATIENT
Start: 2022-10-07

## 2022-10-07 RX ORDER — LOSARTAN POTASSIUM 50 MG/1
50 TABLET ORAL DAILY
Qty: 90 TABLET | Refills: 0 | Status: SHIPPED | OUTPATIENT
Start: 2022-10-07 | End: 2023-01-05

## 2022-10-07 NOTE — TELEPHONE ENCOUNTER
Phoned Pt to notify to stop taking lisinopril and continue Losartan. Refills for Losartan and Insulin sent today. Pt confirmed he scheduled an appt with Diabetic Clinic for 11/14.

## 2022-10-10 ENCOUNTER — PATIENT MESSAGE (OUTPATIENT)
Dept: FAMILY MEDICINE CLINIC | Facility: CLINIC | Age: 47
End: 2022-10-10

## 2022-10-10 NOTE — TELEPHONE ENCOUNTER
From: Adebayo Liang  To: Africa Valencia MD  Sent: 10/10/2022 10:32 AM CDT  Subject: Blood level    Hi doctor wasn't I at one time on a medicine that pulled the extra protein from my urine?  My levels in the morning is averaging 110 I'm really watching my diet and taking my insulin at night

## 2022-10-10 NOTE — TELEPHONE ENCOUNTER
Response via my chart educating on management of blood sugar as the way to minimize protein in urine.

## 2022-10-21 ENCOUNTER — OFFICE VISIT (OUTPATIENT)
Dept: FAMILY MEDICINE CLINIC | Facility: CLINIC | Age: 47
End: 2022-10-21
Payer: COMMERCIAL

## 2022-10-21 DIAGNOSIS — J01.00 SUBACUTE MAXILLARY SINUSITIS: ICD-10-CM

## 2022-10-21 DIAGNOSIS — J20.9 ACUTE BRONCHITIS, UNSPECIFIED ORGANISM: ICD-10-CM

## 2022-10-21 DIAGNOSIS — E78.00 PURE HYPERCHOLESTEROLEMIA WITH TARGET LOW DENSITY LIPOPROTEIN (LDL) CHOLESTEROL LESS THAN 130 MG/DL: ICD-10-CM

## 2022-10-21 DIAGNOSIS — E11.65 UNCONTROLLED TYPE 2 DIABETES MELLITUS WITH HYPERGLYCEMIA (HCC): Primary | ICD-10-CM

## 2022-10-21 PROCEDURE — 3008F BODY MASS INDEX DOCD: CPT | Performed by: FAMILY MEDICINE

## 2022-10-21 PROCEDURE — 3075F SYST BP GE 130 - 139MM HG: CPT | Performed by: FAMILY MEDICINE

## 2022-10-21 PROCEDURE — 99214 OFFICE O/P EST MOD 30 MIN: CPT | Performed by: FAMILY MEDICINE

## 2022-10-21 PROCEDURE — 3079F DIAST BP 80-89 MM HG: CPT | Performed by: FAMILY MEDICINE

## 2022-10-21 RX ORDER — INSULIN GLARGINE 100 [IU]/ML
INJECTION, SOLUTION SUBCUTANEOUS
Qty: 3 ML | Refills: 1 | Status: SHIPPED | OUTPATIENT
Start: 2022-10-21

## 2022-10-21 RX ORDER — LOSARTAN POTASSIUM 50 MG/1
50 TABLET ORAL DAILY
Qty: 90 TABLET | Refills: 1 | Status: SHIPPED | OUTPATIENT
Start: 2022-10-21 | End: 2022-10-21

## 2022-10-21 RX ORDER — LOSARTAN POTASSIUM 50 MG/1
50 TABLET ORAL DAILY
Qty: 30 TABLET | Refills: 1 | Status: SHIPPED | OUTPATIENT
Start: 2022-10-21 | End: 2022-11-20

## 2022-10-21 RX ORDER — AZITHROMYCIN 250 MG/1
TABLET, FILM COATED ORAL
Qty: 6 TABLET | Refills: 0 | Status: SHIPPED | OUTPATIENT
Start: 2022-10-21 | End: 2022-10-26

## 2022-10-21 RX ORDER — INSULIN GLARGINE-YFGN 100 [IU]/ML
25 INJECTION, SOLUTION SUBCUTANEOUS NIGHTLY
Qty: 30 ML | Refills: 0 | Status: CANCELLED | OUTPATIENT
Start: 2022-10-21

## 2022-10-21 RX ORDER — ATORVASTATIN CALCIUM 10 MG/1
10 TABLET, FILM COATED ORAL NIGHTLY
Qty: 90 TABLET | Refills: 1 | Status: SHIPPED | OUTPATIENT
Start: 2022-10-21

## 2022-10-23 DIAGNOSIS — J01.00 SUBACUTE MAXILLARY SINUSITIS: ICD-10-CM

## 2022-10-23 DIAGNOSIS — J20.9 ACUTE BRONCHITIS, UNSPECIFIED ORGANISM: ICD-10-CM

## 2022-10-24 ENCOUNTER — TELEPHONE (OUTPATIENT)
Dept: FAMILY MEDICINE CLINIC | Facility: CLINIC | Age: 47
End: 2022-10-24

## 2022-10-24 VITALS
DIASTOLIC BLOOD PRESSURE: 88 MMHG | RESPIRATION RATE: 18 BRPM | SYSTOLIC BLOOD PRESSURE: 134 MMHG | OXYGEN SATURATION: 99 % | BODY MASS INDEX: 28.06 KG/M2 | HEART RATE: 73 BPM | WEIGHT: 196 LBS | HEIGHT: 70 IN

## 2022-10-24 RX ORDER — AZITHROMYCIN 250 MG/1
TABLET, FILM COATED ORAL
Qty: 6 TABLET | Refills: 0 | OUTPATIENT
Start: 2022-10-24

## 2022-10-24 NOTE — TELEPHONE ENCOUNTER
Pharmacy called and needed a refill due to patient losing a couple of his pills. They need   azithromycin (ZITHROMAX Z-STACI) 250 MG Oral Tab.   Please Contact the Pharmacy at [phone]  With any questions

## 2022-10-24 NOTE — TELEPHONE ENCOUNTER
Pharmacy requesting Rx refill as pt lost some azithromycin pills. Phoned pt to inquire how many pills he misplaced. Lost one at restaurant over the weekend. Phoned Pharmacy to confirm.

## 2022-11-14 ENCOUNTER — OFFICE VISIT (OUTPATIENT)
Dept: ENDOCRINOLOGY CLINIC | Facility: CLINIC | Age: 47
End: 2022-11-14
Payer: COMMERCIAL

## 2022-11-14 VITALS
BODY MASS INDEX: 28.06 KG/M2 | HEIGHT: 70 IN | DIASTOLIC BLOOD PRESSURE: 80 MMHG | RESPIRATION RATE: 16 BRPM | SYSTOLIC BLOOD PRESSURE: 130 MMHG | HEART RATE: 92 BPM | WEIGHT: 196 LBS

## 2022-11-14 DIAGNOSIS — I10 PRIMARY HYPERTENSION: ICD-10-CM

## 2022-11-14 DIAGNOSIS — E78.2 MIXED HYPERLIPIDEMIA: ICD-10-CM

## 2022-11-14 DIAGNOSIS — Z79.4 TYPE 2 DIABETES MELLITUS WITH HYPERGLYCEMIA, WITH LONG-TERM CURRENT USE OF INSULIN (HCC): Primary | ICD-10-CM

## 2022-11-14 DIAGNOSIS — E11.65 TYPE 2 DIABETES MELLITUS WITH HYPERGLYCEMIA, WITH LONG-TERM CURRENT USE OF INSULIN (HCC): Primary | ICD-10-CM

## 2022-11-14 LAB
GLUCOSE BLOOD: 210
TEST STRIP LOT #: NORMAL NUMERIC

## 2022-11-14 RX ORDER — LOSARTAN POTASSIUM 100 MG/1
100 TABLET ORAL DAILY
Qty: 30 TABLET | Refills: 1 | Status: SHIPPED | OUTPATIENT
Start: 2022-11-14

## 2022-11-14 RX ORDER — SEMAGLUTIDE 1.34 MG/ML
0.25 INJECTION, SOLUTION SUBCUTANEOUS
Qty: 1.5 ML | Refills: 0 | COMMUNITY
Start: 2022-11-14

## 2022-11-14 RX ORDER — ATORVASTATIN CALCIUM 20 MG/1
20 TABLET, FILM COATED ORAL NIGHTLY
Qty: 30 TABLET | Refills: 1 | Status: SHIPPED | OUTPATIENT
Start: 2022-11-14

## 2022-11-14 NOTE — PATIENT INSTRUCTIONS
We are here to support you with Diabetes but please remember that you still need your primary care doctor for your routine health maintenance. Your current A1C: 10%   This test provides us with your average blood sugar for the past 3 months. The main goal of diabetes treatment is to keep your sugar from going too high. We measure your overall blood sugar trends with a Hemoglobin A1C test. (also called an A1C)  For most people the target is less than 7.0% but sometimes we make exceptions based on age, health history and other factors. Keeping an A1C less than 7% helps prevents diabetes related health problems. If your A1C goes too high, then we need to talk about changing your current diabetes treatment. MEDICATIONS:   It is important to take all of your medications as prescribed. Please call me if you cannot get the prescriptions filled or are having issues with refills. Also, please call me if you have any issues with medication questions, side effects, dosing questions or problems with your blood sugar trends BEFORE CHANGING OR STOPPING ANY MEDICATIONS. Continue Metformin 1000mg 2x/day with breakfast and dinner  Continue Semglee 25 units injection daily  Start Ozempic 0.25mg injection ONCE WEEKLY  Increase atorvastatin to 20mg nightly  Increase losartan to 100mg daily    Blood sugar testing:   Always bring your glucose meter or blood sugar logbook to every appointment here at the diabetes center. This allows me to safely make adjustments to your diabetes plan. In order for me to determine any patterns in your blood sugars, you will need to use personal [facility] 2 CGM or test your blood sugar 2 times daily   Scan sensor a minimum of every 8 hours - when you wake up, before eating and at bedtime to prevent loss of data  Call if 2 readings below 80 mg/dl in 1 week for medication adjustment.      Blood sugar targets:  Before breakfast:   (preferably < 110)  2 hours After meals: less than 180 (preferably less than 150)   Call for persistent blood sugars < 75 or > 200. Blood sugars greater than 200 are not acceptable to reach your goal of improving diabetes      Health Maintenance:   1. LABS: It is important to monitor your kidney function (blood and urine protein levels) , liver function tests and cholesterol levels when you have diabetes. 2. FOOT EXAMS:  daily foot inspections for foot wounds or skin changes are important for foot care. Any unusual changes should be reported immediately. 3. EYE EXAMS: Checking your eyes for diabetes changes is important and you should have a dilated eye exam done by an eye doctor EVERY year since these changes occur in the BACK of the eye and not visible by you. Please let me know if you need provider list for eye doctor. Lifestyle Therapy:    1. NUTRITION: Maintain optimal weight, calorie restriction if overweight, plant-based diet    2. PHYSICAL ACTIVITY: 150 minutes per week (30 minutes a day 5 days a week) of moderate exertion such as walking, stair climbing. Include strength training 2-3 times per week. Increase as tolerated. 3. SLEEP: Try and get 7-8 hours of sleep per night    4. BEHAVIOR:  Tobacco cessation and alcohol in moderation      Reminders:  Send SmartRx message in one week if you would like order sent to pharmacy for [facility] 2 sensors    [employer], [facility], [facility]  [address], [address], [address], [address] [facility], [address]  [address]  [address], [address]  Diabetes Services at [address] [facility] [address]

## 2023-01-04 DIAGNOSIS — I10 PRIMARY HYPERTENSION: ICD-10-CM

## 2023-01-05 RX ORDER — LOSARTAN POTASSIUM 100 MG/1
TABLET ORAL
Qty: 30 TABLET | Refills: 1 | Status: SHIPPED | OUTPATIENT
Start: 2023-01-05

## 2023-01-18 DIAGNOSIS — E11.65 UNCONTROLLED TYPE 2 DIABETES MELLITUS WITH HYPERGLYCEMIA (HCC): ICD-10-CM

## 2023-01-18 RX ORDER — INSULIN GLARGINE-YFGN 100 [IU]/ML
INJECTION, SOLUTION SUBCUTANEOUS
Qty: 3 ML | Refills: 0 | Status: SHIPPED | OUTPATIENT
Start: 2023-01-18

## 2023-01-18 NOTE — TELEPHONE ENCOUNTER
LF:8/26/22  LOV:10/21/22 observe for sobriety. obtain urine toxicology if able. monitor for alcohol/bzd withdrawal

## 2023-02-22 DIAGNOSIS — E11.65 UNCONTROLLED TYPE 2 DIABETES MELLITUS WITH HYPERGLYCEMIA (HCC): ICD-10-CM

## 2023-02-23 RX ORDER — INSULIN GLARGINE-YFGN 100 [IU]/ML
INJECTION, SOLUTION SUBCUTANEOUS
Qty: 9 ML | Refills: 0 | Status: SHIPPED | OUTPATIENT
Start: 2023-02-23

## 2023-03-17 DIAGNOSIS — E11.65 UNCONTROLLED TYPE 2 DIABETES MELLITUS WITH HYPERGLYCEMIA (HCC): ICD-10-CM

## 2023-03-20 RX ORDER — FLURBIPROFEN SODIUM 0.3 MG/ML
SOLUTION/ DROPS OPHTHALMIC
Qty: 100 EACH | Refills: 0 | Status: SHIPPED | OUTPATIENT
Start: 2023-03-20

## 2023-03-21 DIAGNOSIS — E11.65 TYPE 2 DIABETES MELLITUS WITH HYPERGLYCEMIA, WITH LONG-TERM CURRENT USE OF INSULIN (HCC): Primary | ICD-10-CM

## 2023-03-21 DIAGNOSIS — I10 PRIMARY HYPERTENSION: ICD-10-CM

## 2023-03-21 DIAGNOSIS — Z79.4 TYPE 2 DIABETES MELLITUS WITH HYPERGLYCEMIA, WITH LONG-TERM CURRENT USE OF INSULIN (HCC): Primary | ICD-10-CM

## 2023-03-21 RX ORDER — LOSARTAN POTASSIUM 100 MG/1
100 TABLET ORAL DAILY
Qty: 90 TABLET | Refills: 1 | Status: SHIPPED | OUTPATIENT
Start: 2023-03-21

## 2023-04-20 DIAGNOSIS — E11.65 UNCONTROLLED TYPE 2 DIABETES MELLITUS WITH HYPERGLYCEMIA (HCC): ICD-10-CM

## 2023-04-24 RX ORDER — INSULIN GLARGINE-YFGN 100 [IU]/ML
INJECTION, SOLUTION SUBCUTANEOUS
Qty: 9 ML | Refills: 0 | Status: SHIPPED | OUTPATIENT
Start: 2023-04-24

## 2023-06-12 DIAGNOSIS — E11.65 UNCONTROLLED TYPE 2 DIABETES MELLITUS WITH HYPERGLYCEMIA (HCC): ICD-10-CM

## 2023-06-13 RX ORDER — INSULIN GLARGINE-YFGN 100 [IU]/ML
INJECTION, SOLUTION SUBCUTANEOUS
Qty: 9 ML | Refills: 0 | Status: SHIPPED | OUTPATIENT
Start: 2023-06-13 | End: 2023-06-19

## 2023-06-16 DIAGNOSIS — E11.65 UNCONTROLLED TYPE 2 DIABETES MELLITUS WITH HYPERGLYCEMIA (HCC): ICD-10-CM

## 2023-06-16 DIAGNOSIS — E78.2 MIXED HYPERLIPIDEMIA: ICD-10-CM

## 2023-06-16 RX ORDER — ATORVASTATIN CALCIUM 20 MG/1
TABLET, FILM COATED ORAL
Qty: 90 TABLET | Refills: 0 | Status: SHIPPED | OUTPATIENT
Start: 2023-06-16

## 2023-06-27 ENCOUNTER — OFFICE VISIT (OUTPATIENT)
Dept: FAMILY MEDICINE CLINIC | Facility: CLINIC | Age: 48
End: 2023-06-27
Payer: COMMERCIAL

## 2023-06-27 VITALS
HEIGHT: 70 IN | OXYGEN SATURATION: 98 % | RESPIRATION RATE: 16 BRPM | BODY MASS INDEX: 27.41 KG/M2 | DIASTOLIC BLOOD PRESSURE: 80 MMHG | HEART RATE: 83 BPM | SYSTOLIC BLOOD PRESSURE: 120 MMHG | WEIGHT: 191.5 LBS

## 2023-06-27 DIAGNOSIS — Z23 NEED FOR SHINGLES VACCINE: ICD-10-CM

## 2023-06-27 DIAGNOSIS — Z12.5 SCREENING FOR PROSTATE CANCER: ICD-10-CM

## 2023-06-27 DIAGNOSIS — E11.65 UNCONTROLLED TYPE 2 DIABETES MELLITUS WITH HYPERGLYCEMIA (HCC): ICD-10-CM

## 2023-06-27 DIAGNOSIS — E78.00 PURE HYPERCHOLESTEROLEMIA WITH TARGET LOW DENSITY LIPOPROTEIN (LDL) CHOLESTEROL LESS THAN 130 MG/DL: ICD-10-CM

## 2023-06-27 DIAGNOSIS — Z23 NEED FOR TDAP VACCINATION: ICD-10-CM

## 2023-06-27 DIAGNOSIS — J30.2 SEASONAL ALLERGIES: ICD-10-CM

## 2023-06-27 DIAGNOSIS — Z00.00 ROUTINE GENERAL MEDICAL EXAMINATION AT HEALTH CARE FACILITY: Primary | ICD-10-CM

## 2023-06-27 PROCEDURE — 3008F BODY MASS INDEX DOCD: CPT | Performed by: FAMILY MEDICINE

## 2023-06-27 PROCEDURE — 99396 PREV VISIT EST AGE 40-64: CPT | Performed by: FAMILY MEDICINE

## 2023-06-27 PROCEDURE — 90715 TDAP VACCINE 7 YRS/> IM: CPT | Performed by: FAMILY MEDICINE

## 2023-06-27 PROCEDURE — 3074F SYST BP LT 130 MM HG: CPT | Performed by: FAMILY MEDICINE

## 2023-06-27 PROCEDURE — 90750 HZV VACC RECOMBINANT IM: CPT | Performed by: FAMILY MEDICINE

## 2023-06-27 PROCEDURE — 3079F DIAST BP 80-89 MM HG: CPT | Performed by: FAMILY MEDICINE

## 2023-06-27 PROCEDURE — 90472 IMMUNIZATION ADMIN EACH ADD: CPT | Performed by: FAMILY MEDICINE

## 2023-06-27 PROCEDURE — 99214 OFFICE O/P EST MOD 30 MIN: CPT | Performed by: FAMILY MEDICINE

## 2023-06-27 PROCEDURE — 90471 IMMUNIZATION ADMIN: CPT | Performed by: FAMILY MEDICINE

## 2023-07-13 DIAGNOSIS — J30.2 SEASONAL ALLERGIES: ICD-10-CM

## 2023-07-13 RX ORDER — MONTELUKAST SODIUM 10 MG/1
10 TABLET ORAL NIGHTLY
Qty: 90 TABLET | Refills: 1 | OUTPATIENT
Start: 2023-07-13

## 2023-07-14 RX ORDER — MONTELUKAST SODIUM 10 MG/1
10 TABLET ORAL NIGHTLY
Qty: 90 TABLET | Refills: 1 | Status: SHIPPED | OUTPATIENT
Start: 2023-07-14

## 2023-07-15 DIAGNOSIS — E11.65 UNCONTROLLED TYPE 2 DIABETES MELLITUS WITH HYPERGLYCEMIA (HCC): ICD-10-CM

## 2023-07-17 ENCOUNTER — E-VISIT (OUTPATIENT)
Dept: FAMILY MEDICINE CLINIC | Facility: CLINIC | Age: 48
End: 2023-07-17

## 2023-07-17 ENCOUNTER — TELEPHONE (OUTPATIENT)
Dept: FAMILY MEDICINE CLINIC | Facility: CLINIC | Age: 48
End: 2023-07-17

## 2023-07-17 ENCOUNTER — PATIENT MESSAGE (OUTPATIENT)
Dept: FAMILY MEDICINE CLINIC | Facility: CLINIC | Age: 48
End: 2023-07-17

## 2023-07-17 DIAGNOSIS — J01.00 SUBACUTE MAXILLARY SINUSITIS: ICD-10-CM

## 2023-07-17 DIAGNOSIS — J06.9 UPPER RESPIRATORY TRACT INFECTION, UNSPECIFIED TYPE: Primary | ICD-10-CM

## 2023-07-17 DIAGNOSIS — J06.9 VIRAL UPPER RESPIRATORY TRACT INFECTION: Primary | ICD-10-CM

## 2023-07-17 RX ORDER — AZITHROMYCIN 250 MG/1
TABLET, FILM COATED ORAL
Qty: 6 TABLET | Refills: 0 | Status: SHIPPED | OUTPATIENT
Start: 2023-07-17 | End: 2023-07-21

## 2023-07-17 NOTE — TELEPHONE ENCOUNTER
Pt called in requesting a CPAC for his cold, pt has been experiencing symptoms X3 Days.     I offered to schedule him an appt to be evaluated as well as gave him the information for the walk I clinic  he stated \"I just want the message sent over\"

## 2023-07-17 NOTE — TELEPHONE ENCOUNTER
From: Rhianna Minaya  To: Joyce Fuller MD  Sent: 7/17/2023 9:21 AM CDT  Subject: Not feeling well    Hi Dr Lilly Last I haven't been feeling well the last few days I've been sneezing sore throat and coughing it feels like a summer cold probably from air conditioning .  I would like a  pac please

## 2023-07-17 NOTE — TELEPHONE ENCOUNTER
Pt requesting abx r/t:  Sneezing, slight dry, non productive cough, sore throat x4 days. Initially feverish and diaphoretic. Staying hydrated and taking Taking OTC: Equate, cold & Flu x3 days. Thinks its due to the [facility] in his room. LOV: 6/27/23. Declines OV. Or WIC. Please advise.

## 2023-07-17 NOTE — PROGRESS NOTES
Berline Cranker is a 46year old male submitting an E-Visit questionnaire:  No chief complaint on file. Subjective:   HPI     Mychart E-Visit Sinus 1       Question 7/17/2023  1:20 PM CDT - Filed by Patient    Which of the following have you been experiencing? Congested nose     Cough     Sneezing    Have you had any of the following? None of the above (Difficulty: Swallowing/Breathing/Seeing: Blurry/Double)    if available, please upload an image      Have you taken any over the counter medicines for your symptoms? Yes    If Yes, please list all of the medications you have tried: Cold and flu    Have you tested for COVID-19 since the on-set of these symptoms? No    Are you vaccinated for Coronavirus (COVID-19)? Yes    If Yes, how many doses of the vaccine have you received? 3    Please select the vaccine : Moderna    Have you been exposed to anyone with Covid within the last week? No / Unsure            History/Other:   HISTORY REVIEWED    No Further Nursing Notes to Review         Cold s/s x 4 days with fever, minimal improvement with OTC rx. Assessment & Plan:   ASSESSMENT AND PLAN   No orders of the defined types were placed in this encounter. No diagnosis found. Meds & Refills for this Visit:  Requested Prescriptions      No prescriptions requested or ordered in this encounter   1. Viral upper respiratory tract infection  -oral abx for bacterial sinus infection  - azithromycin (ZITHROMAX Z-STACI) 250 MG Oral Tab; Take 2 tablets (500 mg total) by mouth daily for 1 day, THEN 1 tablet (250 mg total) daily for 4 days. Dispense: 6 tablet; Refill: 0    2. Subacute maxillary sinusitis  -as below:  - azithromycin (ZITHROMAX Z-STACI) 250 MG Oral Tab; Take 2 tablets (500 mg total) by mouth daily for 1 day, THEN 1 tablet (250 mg total) daily for 4 days. Dispense: 6 tablet;  Refill: 0     TIME AND CHARGES      E-Visit Times and Charging  Reviewing Chart & QNR: 4 minutes  Plan: 3 minutes  Note: 4 minutes  My total time spent caring for the patient for E-Visit: 11 minutes - [id number]

## 2023-09-03 DIAGNOSIS — E78.2 MIXED HYPERLIPIDEMIA: ICD-10-CM

## 2023-09-05 NOTE — TELEPHONE ENCOUNTER
Requested Prescriptions     Pending Prescriptions Disp Refills    ATORVASTATIN 20 MG Oral Tab [Pharmacy Med Name: ATORVASTATIN 20MG TABLETS] 90 tablet 0     Sig: TAKE 1 TABLET(20 MG) BY MOUTH EVERY NIGHT     Your appointments       Date & Time Appointment Department [facility])    Oct 10, 2023  3:00 PM CDT Follow Up Visit with Kristian Yeung MD [address], [address] [facility] ([address])              [address] [place] [address] [address] [place] [address] [id number] [phone]          Last A1c value was 10% done 9/23/2022.     Refill 6/16/23  LOV 11/14/22  Adwoa CASTILLO [facility]

## 2023-09-07 RX ORDER — ATORVASTATIN CALCIUM 20 MG/1
20 TABLET, FILM COATED ORAL NIGHTLY
Qty: 90 TABLET | Refills: 0 | Status: SHIPPED | OUTPATIENT
Start: 2023-09-07

## 2023-09-07 NOTE — TELEPHONE ENCOUNTER
Future Appointments   Date Time Provider Lito Alysa   10/10/2023  3:00 PM Cari [facility]alves [facility]   11/6/2023  2:00 PM CONNOR Perez [facility]     Only next available appt for patient

## 2023-09-14 DIAGNOSIS — E11.65 UNCONTROLLED TYPE 2 DIABETES MELLITUS WITH HYPERGLYCEMIA (HCC): ICD-10-CM

## 2023-09-14 RX ORDER — INSULIN GLARGINE-YFGN 100 [IU]/ML
25 INJECTION, SOLUTION SUBCUTANEOUS NIGHTLY
Qty: 9 ML | Refills: 0 | Status: SHIPPED | OUTPATIENT
Start: 2023-09-14

## 2023-09-23 DIAGNOSIS — I10 PRIMARY HYPERTENSION: ICD-10-CM

## 2023-09-25 RX ORDER — LOSARTAN POTASSIUM 100 MG/1
100 TABLET ORAL DAILY
Qty: 90 TABLET | Refills: 0 | Status: SHIPPED | OUTPATIENT
Start: 2023-09-25

## 2023-09-25 NOTE — TELEPHONE ENCOUNTER
Requested Prescriptions     Pending Prescriptions Disp Refills    LOSARTAN 100 MG Oral Tab [Pharmacy Med Name: LOSARTAN 100MG TABLETS] 90 tablet 1     Sig: TAKE 1 TABLET(100 MG) BY MOUTH DAILY     Your appointments       Date & Time Appointment Department [facility])    Oct 10, 2023  3:00 PM CDT Follow Up Visit with Rosa Perez MD [address], [facility], [place] ([address])        Nov 06, 2023  2:00 PM CST Apn/Md Diabetic [facility] [address], [place] ([facility] [place])              [address], [address] [facility]  [address]  [facility] [id number]  [address] [facility] [address] [place]  [facility] [place]  [address] [address] [phone]          Last A1c value was 10% done 9/23/2022.     Refill 3/21/23  LOV 11/14/22

## 2023-10-30 DIAGNOSIS — E11.65 UNCONTROLLED TYPE 2 DIABETES MELLITUS WITH HYPERGLYCEMIA (HCC): ICD-10-CM

## 2023-10-30 RX ORDER — INSULIN GLARGINE-YFGN 100 [IU]/ML
25 INJECTION, SOLUTION SUBCUTANEOUS NIGHTLY
Qty: 9 ML | Refills: 0 | Status: SHIPPED | OUTPATIENT
Start: 2023-10-30

## 2023-11-13 ENCOUNTER — PATIENT MESSAGE (OUTPATIENT)
Dept: FAMILY MEDICINE CLINIC | Facility: CLINIC | Age: 48
End: 2023-11-13

## 2023-11-13 DIAGNOSIS — E11.65 UNCONTROLLED TYPE 2 DIABETES MELLITUS WITH HYPERGLYCEMIA (HCC): Primary | ICD-10-CM

## 2023-11-13 NOTE — TELEPHONE ENCOUNTER
From: Aviva Mcfadden  To:  Chencho Oswald  Sent: 11/13/2023 1:44 PM CST  Subject: Eye exam    I need a referral for my annual eye exam

## 2023-11-18 ENCOUNTER — OFFICE VISIT (OUTPATIENT)
Dept: FAMILY MEDICINE CLINIC | Facility: CLINIC | Age: 48
End: 2023-11-18
Payer: COMMERCIAL

## 2023-11-18 VITALS
DIASTOLIC BLOOD PRESSURE: 84 MMHG | RESPIRATION RATE: 18 BRPM | BODY MASS INDEX: 26.48 KG/M2 | HEIGHT: 70 IN | HEART RATE: 70 BPM | TEMPERATURE: 97 F | WEIGHT: 185 LBS | SYSTOLIC BLOOD PRESSURE: 132 MMHG | OXYGEN SATURATION: 99 %

## 2023-11-18 DIAGNOSIS — J32.9 SINOBRONCHITIS: Primary | ICD-10-CM

## 2023-11-18 DIAGNOSIS — J40 SINOBRONCHITIS: Primary | ICD-10-CM

## 2023-11-18 PROCEDURE — 3079F DIAST BP 80-89 MM HG: CPT | Performed by: NURSE PRACTITIONER

## 2023-11-18 PROCEDURE — 3075F SYST BP GE 130 - 139MM HG: CPT | Performed by: NURSE PRACTITIONER

## 2023-11-18 PROCEDURE — 99213 OFFICE O/P EST LOW 20 MIN: CPT | Performed by: NURSE PRACTITIONER

## 2023-11-18 PROCEDURE — 3008F BODY MASS INDEX DOCD: CPT | Performed by: NURSE PRACTITIONER

## 2023-11-18 RX ORDER — BENZONATATE 200 MG/1
200 CAPSULE ORAL 3 TIMES DAILY PRN
Qty: 20 CAPSULE | Refills: 0 | Status: SHIPPED | OUTPATIENT
Start: 2023-11-18 | End: 2023-11-25

## 2023-11-18 RX ORDER — AMOXICILLIN AND CLAVULANATE POTASSIUM 875; 125 MG/1; MG/1
1 TABLET, FILM COATED ORAL 2 TIMES DAILY
Qty: 20 TABLET | Refills: 0 | Status: SHIPPED | OUTPATIENT
Start: 2023-11-18 | End: 2023-11-28

## 2023-11-18 NOTE — PATIENT INSTRUCTIONS
I recommend the 4 H's for inflammation:    1. Heat (warm mist from the shower or warm liquids such as tea)  2. Honey (mixed in your tea or by the spoonful [if you are not diabetic; over the age of 1 year]--take a spoonful 3 times a day and don't eat or drink anything for 15-20 minutes)  3. Humidity--cool mist in the bedroom at night  4.  Hydration --at least 8 -10 glasses a day     Flonase daily  Benadryl at night to help dry up mucous

## 2023-12-13 DIAGNOSIS — E11.65 UNCONTROLLED TYPE 2 DIABETES MELLITUS WITH HYPERGLYCEMIA (HCC): ICD-10-CM

## 2023-12-14 ENCOUNTER — NURSE ONLY (OUTPATIENT)
Dept: FAMILY MEDICINE CLINIC | Facility: CLINIC | Age: 48
End: 2023-12-14
Payer: COMMERCIAL

## 2023-12-14 DIAGNOSIS — I10 PRIMARY HYPERTENSION: ICD-10-CM

## 2023-12-14 DIAGNOSIS — Z23 NEED FOR SHINGLES VACCINE: Primary | ICD-10-CM

## 2023-12-14 PROCEDURE — 90471 IMMUNIZATION ADMIN: CPT | Performed by: FAMILY MEDICINE

## 2023-12-14 PROCEDURE — 90750 HZV VACC RECOMBINANT IM: CPT | Performed by: FAMILY MEDICINE

## 2023-12-15 RX ORDER — LOSARTAN POTASSIUM 100 MG/1
100 TABLET ORAL DAILY
Qty: 90 TABLET | Refills: 0 | OUTPATIENT
Start: 2023-12-15

## 2023-12-15 NOTE — TELEPHONE ENCOUNTER
Requested Prescriptions     Pending Prescriptions Disp Refills    LOSARTAN 100 MG Oral Tab [Pharmacy Med Name: LOSARTAN 100MG TABLETS] 90 tablet 0     Sig: TAKE 1 TABLET(100 MG) BY MOUTH DAILY     Your appointments       Date & Time Appointment Department ([place])    Jan 04, 2024  4:00 PM CST Follow Up Visit with Lloyd Guaman MD [address] ([facility])              [address]  [address]  [facility] [id number] [phone]          Last A1c value was 10% done 9/23/2022.    Refill 9/25/23  LOV 11/14/22  No FU - pt hasn't been seen in over a year and has either cancelled/no showed last 5 appts this year

## 2023-12-18 DIAGNOSIS — I10 PRIMARY HYPERTENSION: ICD-10-CM

## 2023-12-18 DIAGNOSIS — E78.2 MIXED HYPERLIPIDEMIA: ICD-10-CM

## 2023-12-18 RX ORDER — LOSARTAN POTASSIUM 100 MG/1
100 TABLET ORAL DAILY
Qty: 90 TABLET | Refills: 0 | OUTPATIENT
Start: 2023-12-18

## 2023-12-18 RX ORDER — ATORVASTATIN CALCIUM 20 MG/1
20 TABLET, FILM COATED ORAL NIGHTLY
Qty: 90 TABLET | Refills: 0 | Status: SHIPPED | OUTPATIENT
Start: 2023-12-18 | End: 2023-12-18 | Stop reason: CLARIF

## 2023-12-18 NOTE — TELEPHONE ENCOUNTER
Requested Prescriptions     Pending Prescriptions Disp Refills    ATORVASTATIN 20 MG Oral Tab [Pharmacy Med Name: ATORVASTATIN 20MG TABLETS] 90 tablet 0     Sig: TAKE 1 TABLET(20 MG) BY MOUTH EVERY NIGHT     Your appointments       Date & Time Appointment Department [facility])    Jan 04, 2024  4:00 PM CST Follow Up Visit with Shlomo Fraire, [address], [address] [facility] ([address])              [address]  [facility] [place] [address] [phone]          Last A1c value was 10% done 9/23/2022.     Refill 9/7/23  LOV 11/14/22, RTC 4 weeks  No FU - pt hasn't been seen in over a year, please advise

## 2023-12-19 RX ORDER — LOSARTAN POTASSIUM 100 MG/1
100 TABLET ORAL DAILY
Qty: 90 TABLET | Refills: 0 | Status: SHIPPED | OUTPATIENT
Start: 2023-12-19

## 2024-01-02 ENCOUNTER — TELEPHONE (OUTPATIENT)
Dept: FAMILY MEDICINE CLINIC | Facility: CLINIC | Age: 49
End: 2024-01-02

## 2024-01-02 ENCOUNTER — OFFICE VISIT (OUTPATIENT)
Dept: FAMILY MEDICINE CLINIC | Facility: CLINIC | Age: 49
End: 2024-01-02
Payer: COMMERCIAL

## 2024-01-02 VITALS
HEIGHT: 70 IN | RESPIRATION RATE: 20 BRPM | TEMPERATURE: 98 F | DIASTOLIC BLOOD PRESSURE: 70 MMHG | SYSTOLIC BLOOD PRESSURE: 121 MMHG | HEART RATE: 80 BPM | OXYGEN SATURATION: 99 % | BODY MASS INDEX: 27.49 KG/M2 | WEIGHT: 192 LBS

## 2024-01-02 DIAGNOSIS — U07.1 COVID-19: Primary | ICD-10-CM

## 2024-01-02 LAB
OPERATOR ID: ABNORMAL
POCT LOT NUMBER: ABNORMAL
RAPID SARS-COV-2 BY PCR: DETECTED

## 2024-01-02 PROCEDURE — U0002 COVID-19 LAB TEST NON-CDC: HCPCS | Performed by: PHYSICIAN ASSISTANT

## 2024-01-02 PROCEDURE — 3074F SYST BP LT 130 MM HG: CPT | Performed by: PHYSICIAN ASSISTANT

## 2024-01-02 PROCEDURE — 99213 OFFICE O/P EST LOW 20 MIN: CPT | Performed by: PHYSICIAN ASSISTANT

## 2024-01-02 PROCEDURE — 3008F BODY MASS INDEX DOCD: CPT | Performed by: PHYSICIAN ASSISTANT

## 2024-01-02 PROCEDURE — 3078F DIAST BP <80 MM HG: CPT | Performed by: PHYSICIAN ASSISTANT

## 2024-01-02 RX ORDER — ATORVASTATIN CALCIUM 20 MG/1
TABLET, FILM COATED ORAL
COMMUNITY
Start: 2023-12-18

## 2024-01-02 NOTE — PROGRESS NOTES
CHIEF COMPLAINT:     Chief Complaint   Patient presents with    Cold     Cough, congestion, x4 days        HPI:   Farzad Romo is a 48 year old male who presents with complaints of feeling sick for 4 days.  Symptoms include cough, nasal congestion, nasal drainage, and right ear pressure.  The patient denies fever, sore throat, chest pain, shortness of breath, wheezing, abdominal pain, vomiting, diarrhea, and rash.  The patient just returned home from a trip to the [place].    Current Outpatient Medications   Medication Sig Dispense Refill    atorvastatin 20 MG Oral Tab       losartan 100 MG Oral Tab Take 1 tablet (100 mg total) by mouth daily. 90 tablet 0    metFORMIN HCl 1000 MG Oral Tab Take 1 tablet (1,000 mg total) by mouth 2 (two) times daily. 180 tablet 1    Insulin Glargine-yfgn (SEMGLEE, YFGN,) 100 UNIT/ML Subcutaneous Solution Pen-injector Inject 25 Units into the skin nightly. 9 mL 0    montelukast 10 MG Oral Tab Take 1 tablet (10 mg total) by mouth nightly. 90 tablet 1    Insulin Pen Needle (BD PEN NEEDLE MINI U/F) 31G X 5 MM Does not apply Misc USE DAILY WITH SEMGLEE. 100 each 0    Blood Glucose Monitoring Suppl (CONTOUR NEXT EZ) w/Device Does not apply Kit 1 kit by Does not apply route daily. 1 kit 0    MICROLET LANCETS Does not apply Misc TEST THREE TIMES DAILY 300 each 0    CONTOUR NEXT TEST In Vitro Strip USE THREE TIMES DAILY 300 strip 0    aspirin (ASPIR-81) 81 MG Oral Tab EC Take 1 tablet by mouth daily. 1 tablet 0    Blood Pressure Monitoring (BLOOD PRESSURE KIT) Does not apply Device 1 each daily. (Patient not taking: Reported on 1/2/2024) 1 each 2    semaglutide (OZEMPIC, 0.25 OR 0.5 MG/DOSE,) 2 MG/1.5ML Subcutaneous Solution Pen-injector Inject 0.25 mg into the skin every 7 days. (Patient not taking: Reported on 11/18/2023) 1.5 mL 0    cyanocobalamin ([employer] VITAMIN B12) 1000 MCG Oral Tab Take 1 tablet (1,000 mcg total) by mouth daily. (Patient not taking: Reported on 11/18/2023)  30 tablet 3      Past Medical History:   Diagnosis Date    Type II or unspecified type diabetes mellitus without mention of complication, not stated as uncontrolled       Social History:  Social History     Socioeconomic History    Marital status: Single   Tobacco Use    Smoking status: Never    Smokeless tobacco: Never   Vaping Use    Vaping Use: Never used   Substance and Sexual Activity    Alcohol use: Yes     Comment: rarely    Drug use: No   Other Topics Concern    Caffeine Concern No     Comment: very little    Exercise Yes     Comment: 4x per week    Seat Belt Yes    Special Diet No    Stress Concern Yes    Weight Concern No        REVIEW OF SYSTEMS:   GENERAL: Denies fever, chills,weight change, decreased appetite  SKIN: Denies rashes, skin wounds or ulcers.  EYES: Denies blurred vision or double vision  HENT: See HPI  CHEST: Denies chest pain, or palpitations  LUNGS: See HPI  GI: Denies abdominal pain, N/V/C/D.   MUSCULOSKELETAL: no arthralgia or swollen joints  LYMPH:  Denies lymphadenopathy  NEURO: Denies headaches or lightheadedness      EXAM:   /70   Pulse 80   Temp 98.1 °F (36.7 °C) (Oral)   Resp 20   Ht 5' 10\" (1.778 m)   Wt 192 lb (87.1 kg)   SpO2 99%   BMI 27.55 kg/m²   GENERAL: well developed, well nourished,in no apparent distress, cooperative   SKIN: no rashes, nosuspicious lesions, no abnormal pigmentation  HEAD: atraumatic, normocephalic  EYES: EOM intact, PERRL.  Conjunctiva normal.  Cornea clear.  Lid margins normal.  No active drainage.  EARS: Right TM normal, no bulging, no retraction, no fluid, bony landmarks normal.  Left TM normal, no bulging, no retraction, no fluid, bony landmarks normal.    NOSE: nostrils patent, + discharge, nasal mucosa pink and not inflamed.  No sinus tenderness.  THROAT: oral mucosa pink, moist and intact. No visible dental caries. Posterior pharynx without erythema or exudates.  NECK: supple, non-tender.  LUNGS: clear to auscultation bilaterally, no  wheezes or rhonchi. Breathing is non labored.  CARDIO: RRR without murmur  GI: No visible scars, or masses. BS's present x4. No palpable masses or hepatosplenomegaly.  Non tender.  No guarding or rebound tenderness  EXTREMITIES: no cyanosis, clubbing or edema.  Homans NEG.  Dorsalis Pedis 2+.  LYMPH:  No lymphadenopathy.    NEURO: A&Ox3.  CN II-XII intact.  No focal deficits.  Coordination and Gait normal.  Kernig and Brudzinski's are negative.    Rapid COVID is POSITIVE    Patient declined Paxlovid      ASSESSMENT AND PLAN:     ASSESSMENT:  Encounter Diagnosis   Name Primary?    COVID-19 Yes       PLAN:    Patient Instructions   Isolation discussed  Flonase  Claritin  Follow up with PCP   If worse seek treatment

## 2024-01-02 NOTE — TELEPHONE ENCOUNTER
Pt was at [facility] and was asking for Zpak from them. He has congestion and a bad cough. He tested positive for covid. Asking for zpak from [facility].     [id number] [place], [place]

## 2024-01-02 NOTE — TELEPHONE ENCOUNTER
Advised patient to follow the instructions of the WIC. Patient verbalized understanding. Answered all questions at this time.

## 2024-01-02 NOTE — TELEPHONE ENCOUNTER
Per WIC note,  Patient Instructions   Isolation discussed  Flonase  Claritin  Follow up with PCP   If worse seek treatment

## 2024-01-25 ENCOUNTER — OFFICE VISIT (OUTPATIENT)
Dept: FAMILY MEDICINE CLINIC | Facility: CLINIC | Age: 49
End: 2024-01-25
Payer: COMMERCIAL

## 2024-01-25 ENCOUNTER — LAB ENCOUNTER (OUTPATIENT)
Dept: LAB | Age: 49
End: 2024-01-25
Attending: FAMILY MEDICINE
Payer: COMMERCIAL

## 2024-01-25 VITALS
HEIGHT: 70 IN | WEIGHT: 187 LBS | OXYGEN SATURATION: 97 % | TEMPERATURE: 98 F | SYSTOLIC BLOOD PRESSURE: 126 MMHG | RESPIRATION RATE: 12 BRPM | HEART RATE: 77 BPM | DIASTOLIC BLOOD PRESSURE: 82 MMHG | BODY MASS INDEX: 26.77 KG/M2

## 2024-01-25 DIAGNOSIS — Z12.5 SCREENING FOR PROSTATE CANCER: ICD-10-CM

## 2024-01-25 DIAGNOSIS — Z00.00 ANNUAL PHYSICAL EXAM: Primary | ICD-10-CM

## 2024-01-25 DIAGNOSIS — Z79.4 TYPE 2 DIABETES MELLITUS WITH HYPERGLYCEMIA, WITH LONG-TERM CURRENT USE OF INSULIN (HCC): ICD-10-CM

## 2024-01-25 DIAGNOSIS — Z00.00 ROUTINE GENERAL MEDICAL EXAMINATION AT HEALTH CARE FACILITY: ICD-10-CM

## 2024-01-25 DIAGNOSIS — E78.00 PURE HYPERCHOLESTEROLEMIA WITH TARGET LOW DENSITY LIPOPROTEIN (LDL) CHOLESTEROL LESS THAN 130 MG/DL: ICD-10-CM

## 2024-01-25 DIAGNOSIS — E11.65 UNCONTROLLED TYPE 2 DIABETES MELLITUS WITH HYPERGLYCEMIA (HCC): ICD-10-CM

## 2024-01-25 DIAGNOSIS — K63.5 POLYP OF COLON, UNSPECIFIED PART OF COLON, UNSPECIFIED TYPE: ICD-10-CM

## 2024-01-25 DIAGNOSIS — J30.2 SEASONAL ALLERGIES: ICD-10-CM

## 2024-01-25 DIAGNOSIS — E11.65 TYPE 2 DIABETES MELLITUS WITH HYPERGLYCEMIA, WITH LONG-TERM CURRENT USE OF INSULIN (HCC): ICD-10-CM

## 2024-01-25 LAB
ALBUMIN SERPL-MCNC: 4.5 G/DL (ref 3.4–5)
ALBUMIN/GLOB SERPL: 1 {RATIO} (ref 1–2)
ALP LIVER SERPL-CCNC: 84 U/L
ANION GAP SERPL CALC-SCNC: 6 MMOL/L (ref 0–18)
AST SERPL-CCNC: 20 U/L (ref 15–37)
BASOPHILS # BLD AUTO: 0.05 X10(3) UL (ref 0–0.2)
BASOPHILS NFR BLD AUTO: 0.7 %
BILIRUB SERPL-MCNC: 0.9 MG/DL (ref 0.1–2)
BUN BLD-MCNC: 15 MG/DL (ref 9–23)
CALCIUM BLD-MCNC: 9.9 MG/DL (ref 8.5–10.1)
CHLORIDE SERPL-SCNC: 103 MMOL/L (ref 98–112)
CHOLEST SERPL-MCNC: 172 MG/DL (ref ?–200)
CO2 SERPL-SCNC: 29 MMOL/L (ref 21–32)
COMPLEXED PSA SERPL-MCNC: 0.44 NG/ML (ref ?–4)
CREAT BLD-MCNC: 1.04 MG/DL
CREAT UR-SCNC: 270 MG/DL
EGFRCR SERPLBLD CKD-EPI 2021: 87 ML/MIN/1.73M2 (ref 60–?)
EOSINOPHIL # BLD AUTO: 0.23 X10(3) UL (ref 0–0.7)
EOSINOPHIL NFR BLD AUTO: 3.3 %
ERYTHROCYTE [DISTWIDTH] IN BLOOD BY AUTOMATED COUNT: 12.9 %
EST. AVERAGE GLUCOSE BLD GHB EST-MCNC: 289 MG/DL (ref 68–126)
FASTING PATIENT LIPID ANSWER: YES
FASTING STATUS PATIENT QL REPORTED: YES
GLOBULIN PLAS-MCNC: 4.4 G/DL (ref 2.8–4.4)
GLUCOSE BLD-MCNC: 239 MG/DL (ref 70–99)
HBA1C MFR BLD: 11.7 % (ref ?–5.7)
HCT VFR BLD AUTO: 44.4 %
HDLC SERPL-MCNC: 46 MG/DL (ref 40–59)
HGB BLD-MCNC: 15.8 G/DL
IMM GRANULOCYTES # BLD AUTO: 0.02 X10(3) UL (ref 0–1)
IMM GRANULOCYTES NFR BLD: 0.3 %
LDLC SERPL CALC-MCNC: 106 MG/DL (ref ?–100)
LYMPHOCYTES # BLD AUTO: 1.61 X10(3) UL (ref 1–4)
LYMPHOCYTES NFR BLD AUTO: 22.8 %
MCH RBC QN AUTO: 29.9 PG (ref 26–34)
MCHC RBC AUTO-ENTMCNC: 35.6 G/DL (ref 31–37)
MCV RBC AUTO: 84.1 FL
MICROALBUMIN UR-MCNC: 75.3 MG/DL
MICROALBUMIN/CREAT 24H UR-RTO: 278.9 UG/MG (ref ?–30)
MONOCYTES # BLD AUTO: 0.38 X10(3) UL (ref 0.1–1)
MONOCYTES NFR BLD AUTO: 5.4 %
NEUTROPHILS # BLD AUTO: 4.78 X10 (3) UL (ref 1.5–7.7)
NEUTROPHILS # BLD AUTO: 4.78 X10(3) UL (ref 1.5–7.7)
NEUTROPHILS NFR BLD AUTO: 67.5 %
NONHDLC SERPL-MCNC: 126 MG/DL (ref ?–130)
OSMOLALITY SERPL CALC.SUM OF ELEC: 295 MOSM/KG (ref 275–295)
PLATELET # BLD AUTO: 204 10(3)UL (ref 150–450)
POTASSIUM SERPL-SCNC: 4.3 MMOL/L (ref 3.5–5.1)
PROT SERPL-MCNC: 8.9 G/DL (ref 6.4–8.2)
RBC # BLD AUTO: 5.28 X10(6)UL
SODIUM SERPL-SCNC: 138 MMOL/L (ref 136–145)
TRIGL SERPL-MCNC: 112 MG/DL (ref 30–149)
TSI SER-ACNC: 1.84 MIU/ML (ref 0.36–3.74)
VLDLC SERPL CALC-MCNC: 19 MG/DL (ref 0–30)
WBC # BLD AUTO: 7.1 X10(3) UL (ref 4–11)

## 2024-01-25 PROCEDURE — 3008F BODY MASS INDEX DOCD: CPT | Performed by: FAMILY MEDICINE

## 2024-01-25 PROCEDURE — 80061 LIPID PANEL: CPT

## 2024-01-25 PROCEDURE — 84443 ASSAY THYROID STIM HORMONE: CPT

## 2024-01-25 PROCEDURE — 83036 HEMOGLOBIN GLYCOSYLATED A1C: CPT

## 2024-01-25 PROCEDURE — 99396 PREV VISIT EST AGE 40-64: CPT | Performed by: FAMILY MEDICINE

## 2024-01-25 PROCEDURE — 3060F POS MICROALBUMINURIA REV: CPT | Performed by: FAMILY MEDICINE

## 2024-01-25 PROCEDURE — 3079F DIAST BP 80-89 MM HG: CPT | Performed by: FAMILY MEDICINE

## 2024-01-25 PROCEDURE — 3074F SYST BP LT 130 MM HG: CPT | Performed by: FAMILY MEDICINE

## 2024-01-25 PROCEDURE — 3046F HEMOGLOBIN A1C LEVEL >9.0%: CPT | Performed by: FAMILY MEDICINE

## 2024-01-25 PROCEDURE — 99214 OFFICE O/P EST MOD 30 MIN: CPT | Performed by: FAMILY MEDICINE

## 2024-01-25 PROCEDURE — 80053 COMPREHEN METABOLIC PANEL: CPT

## 2024-01-25 PROCEDURE — 36415 COLL VENOUS BLD VENIPUNCTURE: CPT

## 2024-01-25 PROCEDURE — 82570 ASSAY OF URINE CREATININE: CPT

## 2024-01-25 PROCEDURE — 82043 UR ALBUMIN QUANTITATIVE: CPT

## 2024-01-25 PROCEDURE — 85025 COMPLETE CBC W/AUTO DIFF WBC: CPT

## 2024-01-25 RX ORDER — MONTELUKAST SODIUM 10 MG/1
10 TABLET ORAL NIGHTLY
Qty: 90 TABLET | Refills: 3 | Status: SHIPPED | OUTPATIENT
Start: 2024-01-25

## 2024-01-31 NOTE — PROGRESS NOTES
Chief Complaint   Patient presents with    Follow - Up     Diabetic F/U        Farzad Romo is a 48 year old year old who presents for recheck of diabetes.   Patient's blood pressure is under control.   Patient's eye exam: due  Last A1C was not at goal.   Compliance to diet: no  Compliance to exercise: no  Compliance to taking all medication: yes  Patient has no history of diabetic foot ulcer.     Pt complains of mild seasonal allergies-no cough URI, no flu like symptoms.      Patient denies shortness of breath, denies chest pain and denies any recent fevers or chills.    Patient reports no urinary complaints and denies headaches or visual disturbances.   Patient denies any abdominal pain at this time. Patient has no new skin lesions.  Patient reports no acute back pain and reports no dizziness or headaches.   Patient reports no visual disturbances and reports hearing has been about the same.   Patient reports no recent injury or trauma.          Latest Ref Rng & Units 1/31/2024    12:37 AM 1/25/2024     1:41 PM 1/25/2024    10:59 AM 1/25/2024    10:57 AM 1/25/2024    10:37 AM 1/2/2024    10:16 AM 12/19/2023    12:00 AM   DIABETIC FLOWSHEET ([facility])   BMI    26.83 kg/m2   27.55 kg/m2    Hgb A1c <5.7 %     11.7      Cholesterol Total <200 mg/dL     172      Triglycerides 30 - 149 mg/dL     112      HDL 40 - 59 mg/dL     46      LDL <100 mg/dL     106      Microalbumin Urine mg/dL  75.30         Microalb/Creatinine Calc <=30.0 ug/mg  278.9         HEMOGLOBIN A1c <5.7 %     11.7      Losartan Potassium  100 mg Daily OR    100 mg Daily OR     100 mg Daily OR     100 mg Daily OR     100 mg Daily OR     100 mg Daily OR     100 mg Daily OR       Weight (enc vitals)    187 lb   192 lb    BP (enc vitals)    126/82   121/70    PHQ2 Score     0            11/18/2023    12:18 PM   DIABETIC FLOWSHEET ([facility])   BMI 26.54 kg/m2   Hgb A1c    Cholesterol Total    Triglycerides    HDL    LDL    Microalbumin Urine     Microalb/Creatinine Calc    HEMOGLOBIN A1c    Losartan Potassium 100 mg Daily OR       Weight (enc vitals) 185 lb   BP (enc vitals) 132/84   PHQ2 Score        Medication marked as long-term       Current medications:   Current Outpatient Medications:     montelukast 10 MG Oral Tab, Take 1 tablet (10 mg total) by mouth nightly., Disp: 90 tablet, Rfl: 3    atorvastatin 20 MG Oral Tab, , Disp: , Rfl:     losartan 100 MG Oral Tab, Take 1 tablet (100 mg total) by mouth daily., Disp: 90 tablet, Rfl: 0    metFORMIN HCl 1000 MG Oral Tab, Take 1 tablet (1,000 mg total) by mouth 2 (two) times daily., Disp: 180 tablet, Rfl: 1    Insulin Glargine-yfgn (SEMGLEE, YFGN,) 100 UNIT/ML Subcutaneous Solution Pen-injector, Inject 25 Units into the skin nightly., Disp: 9 mL, Rfl: 0    Blood Pressure Monitoring (BLOOD PRESSURE KIT) Does not apply Device, 1 each daily., Disp: 1 each, Rfl: 2    Insulin Pen Needle (BD PEN NEEDLE MINI U/F) 31G X 5 MM Does not apply Misc, USE DAILY WITH SEMGLEE., Disp: 100 each, Rfl: 0    semaglutide (OZEMPIC, 0.25 OR 0.5 MG/DOSE,) 2 MG/1.5ML Subcutaneous Solution Pen-injector, Inject 0.25 mg into the skin every 7 days., Disp: 1.5 mL, Rfl: 0    cyanocobalamin ([employer] VITAMIN B12) 1000 MCG Oral Tab, Take 1 tablet (1,000 mcg total) by mouth daily., Disp: 30 tablet, Rfl: 3    Blood Glucose Monitoring Suppl (CONTOUR NEXT EZ) w/Device Does not apply Kit, 1 kit by Does not apply route daily., Disp: 1 kit, Rfl: 0    MICROLET LANCETS Does not apply Misc, TEST THREE TIMES DAILY, Disp: 300 each, Rfl: 0    CONTOUR NEXT TEST In Vitro Strip, USE THREE TIMES DAILY, Disp: 300 strip, Rfl: 0    aspirin (ASPIR-81) 81 MG Oral Tab EC, Take 1 tablet by mouth daily., Disp: 1 tablet, Rfl: 0     Last documented BP: 126/82    HEMOGLOBIN A1C (%)   Date Value   01/10/2019 10.6 (A)   12/12/2016 8.5 (A)   09/26/2016 8.0 (A)     HgbA1C (%)   Date Value   01/25/2024 11.7 (H)   09/23/2022 10.0 (H)   07/19/2021 8.8 (H)     LDL Cholesterol  (mg/dL)   Date Value   01/25/2024 106 (H)   09/23/2022 106 (H)   07/19/2021 86     LDL-CHOLESTEROL (mg/dL (calc))   Date Value   12/30/2014 223 (H)     AST (U/L)   Date Value   01/25/2024 20   09/23/2022 27   07/19/2021 15   12/30/2014 18     ALT   Date Value   01/25/2024      Comment:     Due to  backorder we are temporarily unable to offer hospital-based ALT testing at [facility].   If urgently needed, please order ALT test code [id number].   The new order will need a new venipuncture and will be sent to [facility] for testing.   The expected turnaround time will be within 24 hours.    09/23/2022 40 U/L   07/19/2021 23 U/L   12/30/2014 23 U/L        reports that he has never smoked. He has never used smokeless tobacco.    Counseling given: Not Answered      Immunization History   Administered Date(s) Administered    Covid-19 Vaccine Moderna 100 mcg/0.5 ml 04/08/2021, 05/24/2021, 11/26/2021    FLULAVAL 6 months & older 0.5 ml Prefilled syringe (97529) 11/28/2017, 10/19/2020, 11/18/2021    FLUZONE 6 months and older PFS 0.5 ml (28121) 10/20/2015, 10/31/2016, 11/28/2017, 10/19/2020, 11/18/2021    Influenza 10/30/2014    Pneumococcal Conjugate PCV20 07/15/2022    TDAP 12/30/2009, 06/27/2023    Zoster Vaccine Recombinant Adjuvanted (Shingrix) 06/27/2023, 12/14/2023       Review of Systems   Constitutional: Negative for fever, chills and fatigue. No distress.  HENT: Negative for hearing loss, congestion, sore throat, neck pain and dental problem.    Eyes: Negative for pain and visual disturbance.   Respiratory: Negative for cough, chest tightness, shortness of breath and wheezing.    Cardiovascular: Negative for chest pain, palpitations and leg swelling.   Gastrointestinal: Negative for nausea, vomiting, abdominal pain, diarrhea, blood in stool and abdominal distention.   Genitourinary: Negative for dysuria, hematuria and difficulty urinating.   Musculoskeletal: Negative for myalgias, back pain, joint  swelling, arthralgias and gait problem.   Skin: Negative for color change and rash.   Neurological: Negative for dizziness, syncope, weakness, numbness, tingling and headaches.   Hematological: Negative for adenopathy. Does not bruise/bleed easily.   Psychiatric/Behavioral: The patient is not nervous/anxious. No depression.    Patient Active Problem List   Diagnosis    Pure hypercholesterolemia with target low density lipoprotein (LDL) cholesterol less than 130 mg/dL    Seasonal allergies    Uncontrolled type 2 diabetes mellitus with hyperglycemia (HCC)    Vitamin D deficiency    Polyp of colon       Current Outpatient Medications   Medication Sig Dispense Refill    montelukast 10 MG Oral Tab Take 1 tablet (10 mg total) by mouth nightly. 90 tablet 3    atorvastatin 20 MG Oral Tab       losartan 100 MG Oral Tab Take 1 tablet (100 mg total) by mouth daily. 90 tablet 0    metFORMIN HCl 1000 MG Oral Tab Take 1 tablet (1,000 mg total) by mouth 2 (two) times daily. 180 tablet 1    Insulin Glargine-yfgn (SEMGLEE, YFGN,) 100 UNIT/ML Subcutaneous Solution Pen-injector Inject 25 Units into the skin nightly. 9 mL 0    Blood Pressure Monitoring (BLOOD PRESSURE KIT) Does not apply Device 1 each daily. 1 each 2    Insulin Pen Needle (BD PEN NEEDLE MINI U/F) 31G X 5 MM Does not apply Misc USE DAILY WITH SEMGLEE. 100 each 0    semaglutide (OZEMPIC, 0.25 OR 0.5 MG/DOSE,) 2 MG/1.5ML Subcutaneous Solution Pen-injector Inject 0.25 mg into the skin every 7 days. 1.5 mL 0    cyanocobalamin ([employer] VITAMIN B12) 1000 MCG Oral Tab Take 1 tablet (1,000 mcg total) by mouth daily. 30 tablet 3    Blood Glucose Monitoring Suppl (CONTOUR NEXT EZ) w/Device Does not apply Kit 1 kit by Does not apply route daily. 1 kit 0    MICROLET LANCETS Does not apply Misc TEST THREE TIMES DAILY 300 each 0    CONTOUR NEXT TEST In Vitro Strip USE THREE TIMES DAILY 300 strip 0    aspirin (ASPIR-81) 81 MG Oral Tab EC Take 1 tablet by mouth daily. 1 tablet 0       Past  Medical History:   Diagnosis Date    Type II or unspecified type diabetes mellitus without mention of complication, not stated as uncontrolled      Past Surgical History:   Procedure Laterality Date    APPENDECTOMY  10 yrs ago     Family History   Problem Relation Age of Onset    Hypertension Father     Diabetes Father     Hypertension Mother     Diabetes Mother     Diabetes Sister     Diabetes Sister     Diabetes Brother     Dementia Maternal Aunt     Dementia Maternal Aunt     Dementia Maternal Aunt      Social History     Socioeconomic History    Marital status: Single   Tobacco Use    Smoking status: Never    Smokeless tobacco: Never   Vaping Use    Vaping Use: Never used   Substance and Sexual Activity    Alcohol use: Yes     Comment: rarely    Drug use: No   Other Topics Concern    Caffeine Concern No     Comment: very little    Exercise Yes     Comment: 4x per week    Seat Belt Yes    Special Diet No    Stress Concern Yes    Weight Concern No       Physical Exam  /82   Pulse 77   Temp 98.4 °F (36.9 °C)   Resp 12   Ht 5' 10\" (1.778 m)   Wt 187 lb (84.8 kg)   SpO2 97%   BMI 26.83 kg/m²   Constitutional: Alert and Oriented x 3.   HEENT:  Normocephalic and atraumatic. Hearing is normal. Nose normal. Oropharynx is clear and moist.   Eyes: Conjunctivae is normal.   Neck: Normal range of motion. Neck supple. Normal carotid pulses and no JVD present. No edema present. No mass and no thyromegaly present.   Cardiovascular: Normal rate, regular rhythm and intact distal pulses.  No murmur, rubs or gallops.   Pulmonary/Chest: no respiratory distress. Lungs are clear.   Abdominal: Soft. Bowel sounds are normal. Non tender  Musculoskeletal: Normal range of motion.   Neurological: Normal reflexes. No cranial nerve deficit or sensory deficit. normal muscle tone. Coordination normal.   Ext: peripheral pulses normal, no pedal edema, no clubbing or cyanosis    Skin: Skin is warm and dry  Psychiatric: Normal mood and  affect.   Bilateral barefoot skin diabetic exam is normal, visualized feet and the appearance is normal.  Bilateral monofilament/sensation of both feet is normal.  Pulsation pedal pulse exam of both lower legs/feet is normal as well.       A/P:    1. Annual physical exam  -wellness visit was done    2. Seasonal allergies  -allergy control  - montelukast 10 MG Oral Tab; Take 1 tablet (10 mg total) by mouth nightly.  Dispense: 90 tablet; Refill: 3    3. Uncontrolled type 2 diabetes mellitus with hyperglycemia (HCC)  -will check diabetic labs.   - CT CALCIUM SCORING; Future    4. Pure hypercholesterolemia with target low density lipoprotein (LDL) cholesterol less than 130 mg/dL  -will check Ca score  - CT CALCIUM SCORING; Future    5. Polyp of colon, unspecified part of colon, unspecified type  -due for c-scope.   - Surgery Referral - In Network       Diabetes Education:  Diabetes disease process, Nutritional Management, Physical Activity, Using Medications, Monitoring, Preventing Acute Complications, Preventing Chronic Complications, Behavior Change Strategies, Risk Reduction Strategies   Reducing Risk: Daily foot checks, BP Control, Lipid Control, Dilated eye exam, Skin/dental care, Urine for microalbumin, Weight Control  Medication: Take at appropriate times, Take prescribed dose  Healthy Eating: Evenly spaced carb balanced meals, No skipped meals, Increase fiber, fruits and veggies     Pt verbalized understanding and has no further questions at this time.  Over 30 minutes was spent with patient reviewing medication, reviewing labs, and medical plan.    Greater than 50% of visit spent on education and counseling.  Office Follow up visit: 3 months.

## 2024-02-09 ENCOUNTER — TELEPHONE (OUTPATIENT)
Dept: FAMILY MEDICINE CLINIC | Facility: CLINIC | Age: 49
End: 2024-02-09

## 2024-02-12 DIAGNOSIS — E11.65 UNCONTROLLED TYPE 2 DIABETES MELLITUS WITH HYPERGLYCEMIA (HCC): ICD-10-CM

## 2024-02-12 RX ORDER — INSULIN GLARGINE-YFGN 100 [IU]/ML
25 INJECTION, SOLUTION SUBCUTANEOUS NIGHTLY
Qty: 9 ML | Refills: 3 | Status: SHIPPED | OUTPATIENT
Start: 2024-02-12

## 2024-02-25 DIAGNOSIS — E11.65 UNCONTROLLED TYPE 2 DIABETES MELLITUS WITH HYPERGLYCEMIA (HCC): ICD-10-CM

## 2024-02-26 RX ORDER — FLURBIPROFEN SODIUM 0.3 MG/ML
SOLUTION/ DROPS OPHTHALMIC
Qty: 100 EACH | Refills: 2 | Status: SHIPPED | OUTPATIENT
Start: 2024-02-26

## 2024-03-05 DIAGNOSIS — E11.65 UNCONTROLLED TYPE 2 DIABETES MELLITUS WITH HYPERGLYCEMIA (HCC): ICD-10-CM

## 2024-03-07 RX ORDER — INSULIN GLARGINE-YFGN 100 [IU]/ML
25 INJECTION, SOLUTION SUBCUTANEOUS NIGHTLY
Qty: 9 ML | Refills: 3 | Status: SHIPPED | OUTPATIENT
Start: 2024-03-07

## 2024-03-12 DIAGNOSIS — I10 PRIMARY HYPERTENSION: ICD-10-CM

## 2024-03-12 RX ORDER — LOSARTAN POTASSIUM 100 MG/1
100 TABLET ORAL DAILY
Qty: 90 TABLET | Refills: 0 | Status: SHIPPED | OUTPATIENT
Start: 2024-03-12

## 2024-03-15 RX ORDER — ATORVASTATIN CALCIUM 20 MG/1
20 TABLET, FILM COATED ORAL NIGHTLY
Qty: 90 TABLET | Refills: 0 | Status: SHIPPED | OUTPATIENT
Start: 2024-03-15

## 2024-03-15 NOTE — TELEPHONE ENCOUNTER
Requested Prescriptions     Pending Prescriptions Disp Refills    ATORVASTATIN 20 MG Oral Tab [Pharmacy Med Name: ATORVASTATIN 20MG TABLETS] 90 tablet 0     Sig: TAKE 1 TABLET(20 MG) BY MOUTH EVERY NIGHT     Future Appointments   Date Time Provider Department Center   3/18/2024  1:30 PM Laly Alcantara APRN [id number]     Last A1c value was 11.7% done 1/25/2024.  Refill historical   LOV 11/14/22  Pt sees PCP now

## 2024-04-16 ENCOUNTER — TELEPHONE (OUTPATIENT)
Dept: ENDOCRINOLOGY CLINIC | Facility: CLINIC | Age: 49
End: 2024-04-16

## 2024-04-16 NOTE — TELEPHONE ENCOUNTER
Pt was a no show for appt with  today    Called pt and got rescheduled for 7/02, added to waitlist

## 2024-04-23 ENCOUNTER — TELEPHONE (OUTPATIENT)
Dept: FAMILY MEDICINE CLINIC | Facility: CLINIC | Age: 49
End: 2024-04-23

## 2024-04-23 DIAGNOSIS — E11.65 UNCONTROLLED TYPE 2 DIABETES MELLITUS WITH HYPERGLYCEMIA (HCC): Primary | ICD-10-CM

## 2024-04-23 NOTE — TELEPHONE ENCOUNTER
Patient needs a new glucometer and supplies. Please advise on how often patient needs to test. (A1C 11.7% on 1/25/2024. Injects insulin nightly.)

## 2024-04-23 NOTE — TELEPHONE ENCOUNTER
Pt called stating his blood glucose monitor stopped working yesterday. Pt wants to know if we have any Contour next ez blood glucose monitors in office for him to ?

## 2024-04-23 NOTE — TELEPHONE ENCOUNTER
Patient needs OV with diabetic clinic for that, he's been no showing for his appointments, including appointments with me.

## 2024-05-15 ENCOUNTER — HOSPITAL ENCOUNTER (EMERGENCY)
Age: 49
Discharge: HOME | End: 2024-05-15
Attending: EMERGENCY MEDICINE

## 2024-05-15 ENCOUNTER — APPOINTMENT (OUTPATIENT)
Dept: CT IMAGING | Age: 49
End: 2024-05-15
Attending: EMERGENCY MEDICINE

## 2024-05-15 ENCOUNTER — APPOINTMENT (OUTPATIENT)
Dept: GENERAL RADIOLOGY | Age: 49
End: 2024-05-15
Attending: EMERGENCY MEDICINE

## 2024-05-15 VITALS
RESPIRATION RATE: 15 BRPM | HEIGHT: 70 IN | TEMPERATURE: 98 F | DIASTOLIC BLOOD PRESSURE: 89 MMHG | OXYGEN SATURATION: 97 % | WEIGHT: 190 LBS | BODY MASS INDEX: 27.2 KG/M2 | HEART RATE: 76 BPM | SYSTOLIC BLOOD PRESSURE: 158 MMHG

## 2024-05-15 DIAGNOSIS — B97.89 VIRAL RESPIRATORY INFECTION: ICD-10-CM

## 2024-05-15 DIAGNOSIS — I10 POORLY-CONTROLLED HYPERTENSION: Primary | ICD-10-CM

## 2024-05-15 DIAGNOSIS — J98.8 VIRAL RESPIRATORY INFECTION: ICD-10-CM

## 2024-05-15 LAB
ALBUMIN SERPL-MCNC: 3.9 G/DL (ref 3.4–5)
ALBUMIN/GLOB SERPL: 0.9 {RATIO} (ref 1–2)
ALP LIVER SERPL-CCNC: 65 U/L
ALT SERPL-CCNC: 32 U/L
ANION GAP SERPL CALC-SCNC: 6 MMOL/L (ref 0–18)
AST SERPL-CCNC: 23 U/L (ref 15–37)
BASOPHILS # BLD AUTO: 0.03 X10(3) UL (ref 0–0.2)
BASOPHILS NFR BLD AUTO: 0.3 %
BILIRUB SERPL-MCNC: 0.6 MG/DL (ref 0.1–2)
BUN BLD-MCNC: 10 MG/DL (ref 9–23)
CALCIUM BLD-MCNC: 9.8 MG/DL (ref 8.5–10.1)
CHLORIDE SERPL-SCNC: 102 MMOL/L (ref 98–112)
CO2 SERPL-SCNC: 29 MMOL/L (ref 21–32)
CREAT BLD-MCNC: 1.1 MG/DL
EGFRCR SERPLBLD CKD-EPI 2021: 81 ML/MIN/1.73M2 (ref 60–?)
EOSINOPHIL # BLD AUTO: 0.16 X10(3) UL (ref 0–0.7)
EOSINOPHIL NFR BLD AUTO: 1.7 %
ERYTHROCYTE [DISTWIDTH] IN BLOOD BY AUTOMATED COUNT: 13.4 %
GLOBULIN PLAS-MCNC: 4.4 G/DL (ref 2.8–4.4)
GLUCOSE BLD-MCNC: 245 MG/DL (ref 70–99)
HCT VFR BLD AUTO: 40.6 %
HGB BLD-MCNC: 14.5 G/DL
IMM GRANULOCYTES # BLD AUTO: 0.03 X10(3) UL (ref 0–1)
IMM GRANULOCYTES NFR BLD: 0.3 %
LYMPHOCYTES # BLD AUTO: 2.14 X10(3) UL (ref 1–4)
LYMPHOCYTES NFR BLD AUTO: 23.1 %
MCH RBC QN AUTO: 30.1 PG (ref 26–34)
MCHC RBC AUTO-ENTMCNC: 35.7 G/DL (ref 31–37)
MCV RBC AUTO: 84.2 FL
MONOCYTES # BLD AUTO: 0.57 X10(3) UL (ref 0.1–1)
MONOCYTES NFR BLD AUTO: 6.2 %
NEUTROPHILS # BLD AUTO: 6.32 X10 (3) UL (ref 1.5–7.7)
NEUTROPHILS # BLD AUTO: 6.32 X10(3) UL (ref 1.5–7.7)
NEUTROPHILS NFR BLD AUTO: 68.4 %
OSMOLALITY SERPL CALC.SUM OF ELEC: 291 MOSM/KG (ref 275–295)
PLATELET # BLD AUTO: 272 10(3)UL (ref 150–450)
POCT INFLUENZA A: NEGATIVE
POCT INFLUENZA B: NEGATIVE
POTASSIUM SERPL-SCNC: 4.1 MMOL/L (ref 3.5–5.1)
PROT SERPL-MCNC: 8.3 G/DL (ref 6.4–8.2)
RBC # BLD AUTO: 4.82 X10(6)UL
SARS-COV-2 RNA RESP QL NAA+PROBE: NOT DETECTED
SODIUM SERPL-SCNC: 137 MMOL/L (ref 136–145)
WBC # BLD AUTO: 9.3 X10(3) UL (ref 4–11)

## 2024-05-15 PROCEDURE — 36415 COLL VENOUS BLD VENIPUNCTURE: CPT

## 2024-05-15 PROCEDURE — 99284 EMERGENCY DEPT VISIT MOD MDM: CPT

## 2024-05-15 PROCEDURE — 93005 ELECTROCARDIOGRAM TRACING: CPT

## 2024-05-15 PROCEDURE — 99285 EMERGENCY DEPT VISIT HI MDM: CPT

## 2024-05-15 PROCEDURE — 71045 X-RAY EXAM CHEST 1 VIEW: CPT | Performed by: EMERGENCY MEDICINE

## 2024-05-15 PROCEDURE — 70450 CT HEAD/BRAIN W/O DYE: CPT | Performed by: EMERGENCY MEDICINE

## 2024-05-15 PROCEDURE — 85025 COMPLETE CBC W/AUTO DIFF WBC: CPT | Performed by: EMERGENCY MEDICINE

## 2024-05-15 PROCEDURE — 93010 ELECTROCARDIOGRAM REPORT: CPT

## 2024-05-15 PROCEDURE — 80053 COMPREHEN METABOLIC PANEL: CPT | Performed by: EMERGENCY MEDICINE

## 2024-05-15 PROCEDURE — 87502 INFLUENZA DNA AMP PROBE: CPT | Performed by: EMERGENCY MEDICINE

## 2024-05-15 RX ORDER — AMLODIPINE BESYLATE 5 MG/1
5 TABLET ORAL ONCE
Status: COMPLETED | OUTPATIENT
Start: 2024-05-15 | End: 2024-05-15

## 2024-05-15 RX ORDER — BENZONATATE 200 MG/1
200 CAPSULE ORAL 3 TIMES DAILY PRN
Qty: 30 CAPSULE | Refills: 0 | Status: SHIPPED | OUTPATIENT
Start: 2024-05-15 | End: 2024-06-14

## 2024-05-16 ENCOUNTER — HOSPITAL ENCOUNTER (EMERGENCY)
Age: 49
Discharge: HOME | End: 2024-05-16
Attending: EMERGENCY MEDICINE

## 2024-05-16 ENCOUNTER — TELEPHONE (OUTPATIENT)
Dept: FAMILY MEDICINE CLINIC | Facility: CLINIC | Age: 49
End: 2024-05-16

## 2024-05-16 VITALS
OXYGEN SATURATION: 99 % | SYSTOLIC BLOOD PRESSURE: 146 MMHG | DIASTOLIC BLOOD PRESSURE: 97 MMHG | TEMPERATURE: 98 F | RESPIRATION RATE: 20 BRPM | HEIGHT: 71 IN | WEIGHT: 190 LBS | BODY MASS INDEX: 26.6 KG/M2 | HEART RATE: 86 BPM

## 2024-05-16 DIAGNOSIS — I10 UNCONTROLLED HYPERTENSION: Primary | ICD-10-CM

## 2024-05-16 LAB
ATRIAL RATE: 88 BPM
P AXIS: 39 DEGREES
P-R INTERVAL: 170 MS
Q-T INTERVAL: 328 MS
QRS DURATION: 72 MS
QTC CALCULATION (BEZET): 396 MS
R AXIS: -27 DEGREES
T AXIS: 30 DEGREES
VENTRICULAR RATE: 88 BPM

## 2024-05-16 PROCEDURE — 99283 EMERGENCY DEPT VISIT LOW MDM: CPT

## 2024-05-16 RX ORDER — AMLODIPINE BESYLATE 5 MG/1
5 TABLET ORAL DAILY
Qty: 30 TABLET | Refills: 0 | Status: SHIPPED | OUTPATIENT
Start: 2024-05-16 | End: 2024-05-20

## 2024-05-16 NOTE — ED PROVIDER NOTES
Patient Seen in: [facility] In [place]      History     Chief Complaint   Patient presents with    Hypertension     Stated Complaint: Hypertension. Was seen in last night for the same complaint. Needing blood pres*    Subjective:   HPI    Patient presents with hypertension.  The patient was seen here yesterday with hypertension.  I reviewed the notes.  He did have a workup regarding his symptoms of feeling hot and dizzy with his elevated blood pressure as well as some URI symptoms.  He states that he was going to follow-up with his doctor today regarding additional blood pressure medication but he cannot see him until next Friday.  The patient improved with amlodipine yesterday but then today his blood pressure spiked up to 176 systolic again.  He did feel little hot and dizzy at that time and the symptoms have improved since then but not completely resolved.  He denies any chest pain or shortness of breath.  He denies any new neurologic symptoms.  He was hoping to get additional blood pressure medication to use until he is able to see his doctor.  He states his blood sugar today was around 130.    Objective:   Past Medical History:    Essential hypertension    Type II or unspecified type diabetes mellitus without mention of complication, not stated as uncontrolled              Past Surgical History:   Procedure Laterality Date    Appendectomy  10 yrs ago                Social History     Socioeconomic History    Marital status: Single   Tobacco Use    Smoking status: Never    Smokeless tobacco: Never   Vaping Use    Vaping status: Never Used   Substance and Sexual Activity    Alcohol use: Yes     Comment: rarely    Drug use: No   Other Topics Concern    Caffeine Concern No     Comment: very little    Exercise Yes     Comment: 4x per week    Seat Belt Yes    Special Diet No    Stress Concern Yes    Weight Concern No              Review of Systems    Positive for stated complaint: Hypertension.  Was seen in last night for the same complaint. Needing blood pres*  Other systems are as noted in HPI.  Constitutional and vital signs reviewed.      All other systems reviewed and negative except as noted above.    Physical Exam     ED Triage Vitals [05/16/24 1323]   BP (!) 146/97   Pulse 86   Resp 20   Temp 97.8 °F (36.6 °C)   Temp src Temporal   SpO2 99 %   O2 Device None (Room air)       Current Vitals:   Vital Signs  BP: (!) 146/97  Pulse: 86  Resp: 20  Temp: 97.8 °F (36.6 °C)  Temp src: Temporal    Oxygen Therapy  SpO2: 99 %  O2 Device: None (Room air)            Physical Exam    General: Alert and oriented x3 in no acute distress.  HEENT: Normocephalic, atraumatic, pupils equal round and reactive to light.  Neck: Supple.  Cardiovascular: Regular rate and rhythm, no murmurs.  Respiratory: Lungs clear to auscultation.  Extremities: No CCE.  Skin: Warm and dry.  Neurologic: Nonfocal.    ED Course   Labs Reviewed - No data to display             MDM      Patient presents with elevated blood pressure reading.  The patient had a workup yesterday including labs, EKG, CT brain and chest x-ray.  He has continued to have symptomatic hypertension today but no new neurologic symptoms.  We agreed to defer repeat workup at this time.  I will give him a prescription for amlodipine which seemed to help with his blood pressure yesterday.  He was counseled to continue to document his blood pressures and follow-up with his primary as scheduled.  In the meantime if he has any new symptoms including new neurologic symptoms or chest pain, he should return to the emergency department.                           Medical Decision Making      Disposition and Plan     Clinical Impression:  1. Uncontrolled hypertension         Disposition:  Discharge  5/16/2024  2:26 pm    Follow-up:  Lloyd Guaman MD  [id number]  [facility] [id number]  [phone]    Follow up            Medications Prescribed:  Discharge Medication List as of  5/16/2024  2:34 PM        START taking these medications    Details   amLODIPine 5 MG Oral Tab Take 1 tablet (5 mg total) by mouth daily., Normal, Disp-30 tablet, R-0

## 2024-05-16 NOTE — ED INITIAL ASSESSMENT (HPI)
Patient presents with fatigue, headache, hypertension. States he is recently getting over a cold.

## 2024-05-16 NOTE — ED PROVIDER NOTES
Patient Seen in: [facility] In [place]      History     Chief Complaint   Patient presents with    Hypertension     Stated Complaint: HTN, Headache, dizziness    Subjective:   HPI    49-year-old male with past medical history as below including hypertension and diabetes presents with cough and cold symptoms that started 10 days ago along with intermittent headaches for the past few days.  He states he checked his blood pressure at [facility] and it was elevated up to 184/90 prompting ED visit.  He states cough has been improving and is occasionally productive with clear to white sputum.  He states headache is mainly right-sided along his temple area and sometimes radiates to the back of his head.  He denies any associated nausea or vomiting.  He states he did feel little lightheaded earlier today.  He has been compliant with taking his losartan for hypertension.  He states blood pressure usually well-controlled in the 130/80 range.  He denies fever or chills.  Denies chest pain or shortness of breath.  Denies leg swelling or calf pain.  Denies visual changes.      Objective:   Past Medical History:    Essential hypertension    Type II or unspecified type diabetes mellitus without mention of complication, not stated as uncontrolled              Past Surgical History:   Procedure Laterality Date    Appendectomy  10 yrs ago                Social History     Socioeconomic History    Marital status: Single   Tobacco Use    Smoking status: Never    Smokeless tobacco: Never   Vaping Use    Vaping status: Never Used   Substance and Sexual Activity    Alcohol use: Yes     Comment: rarely    Drug use: No   Other Topics Concern    Caffeine Concern No     Comment: very little    Exercise Yes     Comment: 4x per week    Seat Belt Yes    Special Diet No    Stress Concern Yes    Weight Concern No              Review of Systems    Positive for stated complaint: HTN, Headache, dizziness  Other systems are as noted  in HPI.  Constitutional and vital signs reviewed.      All other systems reviewed and negative except as noted above.    Physical Exam     ED Triage Vitals [05/15/24 2011]   BP (!) 188/99   Pulse (!) 126   Resp 20   Temp 98.1 °F (36.7 °C)   Temp src Temporal   SpO2 98 %   O2 Device None (Room air)       Current Vitals:   Vital Signs  BP: 158/89  Pulse: 76  Resp: 15  Temp: 98 °F (36.7 °C)  Temp src: Temporal    Oxygen Therapy  SpO2: 97 %  O2 Device: None (Room air)            Physical Exam  Vitals and nursing note reviewed.   Constitutional:       General: He is not in acute distress.     Appearance: He is well-developed. He is not ill-appearing.   HENT:      Head: Normocephalic and atraumatic.      Mouth/Throat:      Mouth: Mucous membranes are moist.   Eyes:      General: No scleral icterus.     Extraocular Movements: Extraocular movements intact.      Conjunctiva/sclera: Conjunctivae normal.      Pupils: Pupils are equal, round, and reactive to light.   Cardiovascular:      Rate and Rhythm: Normal rate and regular rhythm.   Pulmonary:      Effort: Pulmonary effort is normal.      Breath sounds: Normal breath sounds.   Abdominal:      Palpations: Abdomen is soft.      Tenderness: There is no abdominal tenderness.   Musculoskeletal:      Cervical back: Neck supple.      Right lower leg: No edema.      Left lower leg: No edema.   Skin:     General: Skin is warm and dry.      Capillary Refill: Capillary refill takes less than 2 seconds.   Neurological:      General: No focal deficit present.      Mental Status: He is alert and oriented to person, place, and time.      GCS: GCS eye subscore is 4. GCS verbal subscore is 5. GCS motor subscore is 6.      Cranial Nerves: No cranial nerve deficit.      Sensory: No sensory deficit.      Motor: No weakness.   Psychiatric:         Mood and Affect: Mood normal.         Behavior: Behavior normal.               ED Course     Labs Reviewed   COMP METABOLIC PANEL (14) - Abnormal;  Notable for the following components:       Result Value    Glucose 245 (*)     Total Protein 8.3 (*)     A/G Ratio 0.9 (*)     All other components within normal limits   POCT FLU TEST - Normal    Narrative:     This assay is a rapid molecular in vitro test utilizing nucleic acid amplification of influenza A and B viral RNA.   RAPID SARS-COV-2 BY PCR - Normal   CBC WITH DIFFERENTIAL WITH PLATELET    Narrative:     The following orders were created for panel order CBC With Differential With Platelet.  Procedure                               Abnormality         Status                     ---------                               -----------         ------                     CBC W/ DIFFERENTIAL[[id number]]                              Final result                 Please view results for these tests on the individual orders.   RAINBOW DRAW BLUE   CBC W/ DIFFERENTIAL     EKG    Rate, intervals and axes as noted on EKG Report.  Rate: 88  Rhythm: Sinus Rhythm  Reading: Normal sinus rhythm, minimal criteria for LVH, poor progression anteriorly                 CT BRAIN OR HEAD (08864)    Result Date: 5/15/2024  CONCLUSION:  No acute intracranial abnormality. If there is clinical concern for acute ischemia/infarction, an MRI of the brain would be recommended for further evaluation.    LOCATION:  [facility]   Dictated by (CST): Stromberg, LeRoy, MD on 5/15/2024 at 9:10 PM     Finalized by (CST): Stromberg, LeRoy, MD on 5/15/2024 at 9:14 PM       XR CHEST AP PORTABLE  (CPT=71045)    Result Date: 5/15/2024  CONCLUSION:  Minimal left retrocardiac opacity which may represent atelectasis versus infiltrates.   LOCATION:  [facility]      Dictated by (CST): Markel Fatima MD on 5/15/2024 at 9:00 PM     Finalized by (CST): Markel Faitma MD on 5/15/2024 at 9:01 PM               MDM   49-year-old male with past medical history as below including hypertension and diabetes presents with cough and cold symptoms that started 10 days ago along  with intermittent headaches for the past few days.  He states he checked his blood pressure at [facility] and it was elevated up to 184/90 prompting ED visit.      Differential includes but is not limited to COVID, influenza, other viral respiratory infection, pneumonia, sinusitis, tension headache, hypertensive urgency, ICH    EKG without acute ischemic changes.  Labs are unremarkable with normal WBC, hemoglobin, electrolytes and renal function.  Troponin is normal.    Independent interpretation of CT brain shows no evidence of bleed.  Radiology report as above noting no acute abnormality.  Independent interpretation of chest x-ray shows normal heart size.  Radiology report notes a minimal left retrocardiac opacity which may represent atelectasis versus infiltrate.  Patient is afebrile with clear lungs and normal WBC.  No clinical findings concerning for pneumonia.  He states cough is improving.  Will treat symptomatically with Tessalon.    Patient was treated with dose of amlodipine 5 mg with improvement in blood pressure to 155/89.  He states he feels well at this time.  Denies headache.  Discussed adding amlodipine to losartan he is currently taking.  Patient states he has an appointment with his PCP tomorrow for blood pressure check.  He will discuss adding additional blood pressure medication at that time.  Return precaution discussed.    Medical Decision Making  Amount and/or Complexity of Data Reviewed  Labs: ordered. Decision-making details documented in ED Course.  Radiology: ordered and independent interpretation performed. Decision-making details documented in ED Course.  ECG/medicine tests: ordered and independent interpretation performed. Decision-making details documented in ED Course.    Risk  Prescription drug management.        Disposition and Plan     Clinical Impression:  1. Poorly-controlled hypertension    2. Viral respiratory infection         Disposition:  Discharge  5/15/2024 10:09  pm    Follow-up:  Lloyd Guaman MD  [id number]  [facility] [address]  [phone]    Schedule an appointment as soon as possible for a visit in 2 day(s)            Medications Prescribed:  Current Discharge Medication List        START taking these medications    Details   benzonatate 200 MG Oral Cap Take 1 capsule (200 mg total) by mouth 3 (three) times daily as needed for cough.  Qty: 30 capsule, Refills: 0

## 2024-05-16 NOTE — TELEPHONE ENCOUNTER
Pt at  ED 5/15 r/t poorly controlled BP. Booked soonest available appt Friday 5/24 at 11:00. Pt hoping to see you sooner. Please advise if/when you may be able to accommodate.   ER precautions provided.

## 2024-05-16 NOTE — ED INITIAL ASSESSMENT (HPI)
Pt was here last noc for HTN, pt was previously on BP med due to diabetes but was controlled. Pt was given a dose of BP med here last noc, pressure went down and he was discharged. Pt states today BP is still elevated and continues to c/o headache. Pt is unable to see PCP until next week.

## 2024-05-16 NOTE — TELEPHONE ENCOUNTER
Patient went to the ER yesterday with BP issues.  He said that he's been having trouble with his BP.  He wants to follow up with Dr. Guaman but he is booked until next Friday the 24th.  Can you call patient or is there a place to squeeze patient in with Dr. Guaman?

## 2024-05-16 NOTE — TELEPHONE ENCOUNTER
Spoke to patient and he is currently in the ER again for his blood pressure.  Moved his appointment from 5/24 to 5/20 with DR Guaman to follow up.

## 2024-05-17 PROCEDURE — 36415 COLL VENOUS BLD VENIPUNCTURE: CPT

## 2024-05-17 PROCEDURE — 93010 ELECTROCARDIOGRAM REPORT: CPT

## 2024-05-17 PROCEDURE — 99284 EMERGENCY DEPT VISIT MOD MDM: CPT

## 2024-05-17 PROCEDURE — 93005 ELECTROCARDIOGRAM TRACING: CPT

## 2024-05-18 ENCOUNTER — HOSPITAL ENCOUNTER (EMERGENCY)
Age: 49
Discharge: HOME | End: 2024-05-18
Attending: EMERGENCY MEDICINE

## 2024-05-18 VITALS
BODY MASS INDEX: 26.6 KG/M2 | WEIGHT: 190 LBS | DIASTOLIC BLOOD PRESSURE: 84 MMHG | OXYGEN SATURATION: 100 % | SYSTOLIC BLOOD PRESSURE: 146 MMHG | HEART RATE: 70 BPM | RESPIRATION RATE: 16 BRPM | TEMPERATURE: 98 F | HEIGHT: 71 IN

## 2024-05-18 DIAGNOSIS — E11.65 TYPE 2 DIABETES MELLITUS WITH HYPERGLYCEMIA, UNSPECIFIED WHETHER LONG TERM INSULIN USE (HCC): ICD-10-CM

## 2024-05-18 DIAGNOSIS — I10 HYPERTENSION, UNSPECIFIED TYPE: Primary | ICD-10-CM

## 2024-05-18 LAB
ALBUMIN SERPL-MCNC: 3.7 G/DL (ref 3.4–5)
ALBUMIN/GLOB SERPL: 0.9 {RATIO} (ref 1–2)
ALP LIVER SERPL-CCNC: 57 U/L
ALT SERPL-CCNC: 27 U/L
ANION GAP SERPL CALC-SCNC: 7 MMOL/L (ref 0–18)
AST SERPL-CCNC: 19 U/L (ref 15–37)
ATRIAL RATE: 83 BPM
BASOPHILS # BLD AUTO: 0.04 X10(3) UL (ref 0–0.2)
BASOPHILS NFR BLD AUTO: 0.5 %
BILIRUB SERPL-MCNC: 0.5 MG/DL (ref 0.1–2)
BUN BLD-MCNC: 15 MG/DL (ref 9–23)
CALCIUM BLD-MCNC: 9.3 MG/DL (ref 8.5–10.1)
CHLORIDE SERPL-SCNC: 101 MMOL/L (ref 98–112)
CO2 SERPL-SCNC: 29 MMOL/L (ref 21–32)
CREAT BLD-MCNC: 0.96 MG/DL
EGFRCR SERPLBLD CKD-EPI 2021: 96 ML/MIN/1.73M2 (ref 60–?)
EOSINOPHIL # BLD AUTO: 0.23 X10(3) UL (ref 0–0.7)
EOSINOPHIL NFR BLD AUTO: 3 %
ERYTHROCYTE [DISTWIDTH] IN BLOOD BY AUTOMATED COUNT: 13.4 %
GLOBULIN PLAS-MCNC: 4 G/DL (ref 2.8–4.4)
GLUCOSE BLD-MCNC: 266 MG/DL (ref 70–99)
HCT VFR BLD AUTO: 37.6 %
HGB BLD-MCNC: 13.5 G/DL
IMM GRANULOCYTES # BLD AUTO: 0.02 X10(3) UL (ref 0–1)
IMM GRANULOCYTES NFR BLD: 0.3 %
LYMPHOCYTES # BLD AUTO: 2.55 X10(3) UL (ref 1–4)
LYMPHOCYTES NFR BLD AUTO: 33.2 %
MCH RBC QN AUTO: 30.1 PG (ref 26–34)
MCHC RBC AUTO-ENTMCNC: 35.9 G/DL (ref 31–37)
MCV RBC AUTO: 83.9 FL
MONOCYTES # BLD AUTO: 0.55 X10(3) UL (ref 0.1–1)
MONOCYTES NFR BLD AUTO: 7.2 %
NEUTROPHILS # BLD AUTO: 4.29 X10 (3) UL (ref 1.5–7.7)
NEUTROPHILS # BLD AUTO: 4.29 X10(3) UL (ref 1.5–7.7)
NEUTROPHILS NFR BLD AUTO: 55.8 %
OSMOLALITY SERPL CALC.SUM OF ELEC: 294 MOSM/KG (ref 275–295)
P AXIS: 57 DEGREES
P-R INTERVAL: 174 MS
PLATELET # BLD AUTO: 242 10(3)UL (ref 150–450)
POTASSIUM SERPL-SCNC: 3.7 MMOL/L (ref 3.5–5.1)
PROT SERPL-MCNC: 7.7 G/DL (ref 6.4–8.2)
Q-T INTERVAL: 344 MS
QRS DURATION: 84 MS
QTC CALCULATION (BEZET): 404 MS
R AXIS: -23 DEGREES
RBC # BLD AUTO: 4.48 X10(6)UL
SODIUM SERPL-SCNC: 137 MMOL/L (ref 136–145)
T AXIS: 26 DEGREES
TROPONIN I SERPL HS-MCNC: 5 NG/L
VENTRICULAR RATE: 83 BPM
WBC # BLD AUTO: 7.7 X10(3) UL (ref 4–11)

## 2024-05-18 PROCEDURE — 85025 COMPLETE CBC W/AUTO DIFF WBC: CPT | Performed by: EMERGENCY MEDICINE

## 2024-05-18 PROCEDURE — 80053 COMPREHEN METABOLIC PANEL: CPT | Performed by: EMERGENCY MEDICINE

## 2024-05-18 PROCEDURE — 85025 COMPLETE CBC W/AUTO DIFF WBC: CPT

## 2024-05-18 PROCEDURE — 80053 COMPREHEN METABOLIC PANEL: CPT

## 2024-05-18 PROCEDURE — 84484 ASSAY OF TROPONIN QUANT: CPT

## 2024-05-18 PROCEDURE — 84484 ASSAY OF TROPONIN QUANT: CPT | Performed by: EMERGENCY MEDICINE

## 2024-05-18 NOTE — ED PROVIDER NOTES
Patient Seen in: [facility] In [place]      History     Chief Complaint   Patient presents with    Hypertension     Stated Complaint: Return visit to ER for HTN (180/90) and a headache. He was here last night and *    Subjective:   HPI    Patient is a 49-year-old male presenting to the ED for repeat reevaluation of hypertension.  The history is obtained from patient who is a good historian.  Patient states he felt his blood pressure was elevated and he had it checked at the automated BP cuff at [facility].  He notes that his blood pressure at that time was 180/90.  He did have a generalized mild headache at that time which is typical when his blood pressure is elevated.  No change in mental status.  No weakness or loss of sensation.  No slurring of speech or facial droop.  Patient was initially seen in the ED and was not prescribed any medication.  At that time his plan was to follow-up with his primary care provider.  When he could not get into see his primary care provider, he returned to the ED and was prescribed amlodipine 5 mg tablets.  He did take his losartan and amlodipine yesterday.  Upon arrival to the ED, his blood pressure was 157/91.  No chest pain or shortness of breath.  Patient does have a history of underlying hypertension.  No visual disturbance or dizziness.  No lower extremity edema.  No other recent change in diet or caffeine intake.  The patient states that he was taking [place] cold and flu recently.  He did discontinue use within the week but states he believes that that may have contributed to his blood pressure spike.    Objective:   Past Medical History:    Essential hypertension    Type II or unspecified type diabetes mellitus without mention of complication, not stated as uncontrolled              Past Surgical History:   Procedure Laterality Date    Appendectomy  10 yrs ago                Social History     Socioeconomic History    Marital status: Single    Tobacco Use    Smoking status: Never     Passive exposure: Never    Smokeless tobacco: Never   Vaping Use    Vaping status: Never Used   Substance and Sexual Activity    Alcohol use: Not Currently     Comment: rarely    Drug use: No   Other Topics Concern    Caffeine Concern No     Comment: very little    Exercise Yes     Comment: 4x per week    Seat Belt Yes    Special Diet No    Stress Concern Yes    Weight Concern No              Review of Systems    Positive for stated complaint: Return visit to ER for HTN (180/90) and a headache. He was here last night and *  Other systems are as noted in HPI.  Constitutional and vital signs reviewed.      All other systems reviewed and negative except as noted above.    Physical Exam     ED Triage Vitals   BP 05/17/24 2332 (!) 157/91   Pulse 05/17/24 2332 93   Resp 05/17/24 2332 18   Temp 05/17/24 2332 98.3 °F (36.8 °C)   Temp src 05/17/24 2332 Temporal   SpO2 05/17/24 2332 100 %   O2 Device 05/18/24 0055 None (Room air)       Current Vitals:   Vital Signs  BP: 146/84  Pulse: 70  Resp: 16  Temp: 97.9 °F (36.6 °C)  Temp src: Oral    Oxygen Therapy  SpO2: 100 %  O2 Device: None (Room air)            Physical Exam  Vitals and nursing note reviewed.   Constitutional:       General: He is not in acute distress.     Appearance: Normal appearance. He is not ill-appearing.   HENT:      Head: Normocephalic and atraumatic.      Right Ear: External ear normal.      Left Ear: External ear normal.      Nose: Nose normal.      Mouth/Throat:      Mouth: Mucous membranes are moist.      Pharynx: Oropharynx is clear. No posterior oropharyngeal erythema.   Eyes:      Conjunctiva/sclera: Conjunctivae normal.   Cardiovascular:      Rate and Rhythm: Normal rate and regular rhythm.   Pulmonary:      Effort: Pulmonary effort is normal. No respiratory distress.      Breath sounds: Normal breath sounds.   Abdominal:      General: Abdomen is flat. Bowel sounds are normal. There is no distension.       Tenderness: There is no abdominal tenderness.   Musculoskeletal:      Right lower leg: No edema.      Left lower leg: No edema.   Skin:     General: Skin is warm.      Capillary Refill: Capillary refill takes less than 2 seconds.      Findings: No rash.   Neurological:      General: No focal deficit present.      Mental Status: He is alert and oriented to person, place, and time.   Psychiatric:         Mood and Affect: Mood normal.         Behavior: Behavior normal.               ED Course     Labs Reviewed   COMP METABOLIC PANEL (14) - Abnormal; Notable for the following components:       Result Value    Glucose 266 (*)     A/G Ratio 0.9 (*)     All other components within normal limits   CBC W/ DIFFERENTIAL - Abnormal; Notable for the following components:    HCT 37.6 (*)     All other components within normal limits   TROPONIN I HIGH SENSITIVITY - Normal   CBC WITH DIFFERENTIAL WITH PLATELET    Narrative:     The following orders were created for panel order CBC With Differential With Platelet.  Procedure                               Abnormality         Status                     ---------                               -----------         ------                     CBC W/ DIFFERENTIAL[[id number]]          Abnormal            Final result                 Please view results for these tests on the individual orders.   RAINBOW DRAW BLUE     EKG    Rate, intervals and axes as noted on EKG Report.  Rate: 83  Rhythm: Sinus Rhythm  Reading: Normal                          MDM      History obtained from patient.     Differential diagnosis includes uncontrolled hypertension associate with headache, headache contributing to hypertension.  Patient also recently taking [place] cold and flu medication which could be contributing to elevated blood pressure.  However this time he has discontinued use.    Previous records reviewed.  Patient was seen in the ED on the 15th for poorly controlled hypertension as well as viral  URI.  He was seen on the 16th which is when he was prescribed amlodipine 5 mg.    Testing considered and ordered includes EKG, CBC, CMP, and troponin were ordered by nursing staff.    I reviewed all results.  CBC and CMP unremarkable, other than glucose of 266.  Troponin is negative.    CT brain was considered but headache ultimately improved with improvement of blood pressure and patient states that this is typical for elevated BP.  Otherwise neurologically intact.    No intervention as BP ultimately improved.  Discussed continued use of losartan and amlodipine with home BP monitoring and maintaining BP log, discussed return precautions, as well as outpatient follow-up for reevaluation of BP.  Patient is comfortable discharge plan.                                         Medical Decision Making      Disposition and Plan     Clinical Impression:  1. Hypertension, unspecified type    2. Type 2 diabetes mellitus with hyperglycemia, unspecified whether long term insulin use (HCC)         Disposition:  Discharge  5/18/2024  4:09 am    Follow-up:  Lloyd Guaman MD  [id number]  [facility] [address]  [phone]    Schedule an appointment as soon as possible for a visit in 2 day(s)      [facility] in [place]  [address] [address]  [phone]  Follow up  IF SYMPTOMS WORSEN, PERSIST, OR NEW SYMPTOMS DEVEL          Medications Prescribed:  Discharge Medication List as of 5/18/2024  4:14 AM        START taking these medications    Details   !! Blood Pressure Monitoring Does not apply Device Monitor blood pressure daily, Print, Disp-1 each, R-0       !! - Potential duplicate medications found. Please discuss with provider.

## 2024-05-18 NOTE — DISCHARGE INSTRUCTIONS
Maintain a log of your blood pressure with an accurate blood pressure cuff.  Close follow-up with your primary care provider on Monday as scheduled for reevaluation.

## 2024-05-18 NOTE — ED INITIAL ASSESSMENT (HPI)
Return visit to ER for HTN (180/90) and a headache. He was here last night and says the medication we prescribed isn't working. Earlier he had a soreness in his left arm that's since resolved. No chest pain.

## 2024-05-20 ENCOUNTER — OFFICE VISIT (OUTPATIENT)
Dept: FAMILY MEDICINE CLINIC | Facility: CLINIC | Age: 49
End: 2024-05-20

## 2024-05-20 ENCOUNTER — LAB ENCOUNTER (OUTPATIENT)
Dept: LAB | Age: 49
End: 2024-05-20
Attending: FAMILY MEDICINE

## 2024-05-20 VITALS
RESPIRATION RATE: 16 BRPM | SYSTOLIC BLOOD PRESSURE: 134 MMHG | WEIGHT: 196 LBS | DIASTOLIC BLOOD PRESSURE: 80 MMHG | OXYGEN SATURATION: 98 % | HEART RATE: 75 BPM | BODY MASS INDEX: 27.44 KG/M2 | HEIGHT: 71 IN | TEMPERATURE: 98 F

## 2024-05-20 DIAGNOSIS — E11.65 UNCONTROLLED TYPE 2 DIABETES MELLITUS WITH HYPERGLYCEMIA (HCC): ICD-10-CM

## 2024-05-20 DIAGNOSIS — I16.0 HYPERTENSIVE URGENCY: ICD-10-CM

## 2024-05-20 DIAGNOSIS — E78.00 PURE HYPERCHOLESTEROLEMIA WITH TARGET LOW DENSITY LIPOPROTEIN (LDL) CHOLESTEROL LESS THAN 130 MG/DL: ICD-10-CM

## 2024-05-20 DIAGNOSIS — E11.65 UNCONTROLLED TYPE 2 DIABETES MELLITUS WITH HYPERGLYCEMIA (HCC): Primary | ICD-10-CM

## 2024-05-20 LAB
ALBUMIN SERPL-MCNC: 4.2 G/DL (ref 3.4–5)
ALBUMIN/GLOB SERPL: 1.1 {RATIO} (ref 1–2)
ALP LIVER SERPL-CCNC: 60 U/L
ALT SERPL-CCNC: 30 U/L
ANION GAP SERPL CALC-SCNC: 5 MMOL/L (ref 0–18)
AST SERPL-CCNC: 15 U/L (ref 15–37)
BILIRUB SERPL-MCNC: 0.7 MG/DL (ref 0.1–2)
BUN BLD-MCNC: 14 MG/DL (ref 9–23)
CALCIUM BLD-MCNC: 9.4 MG/DL (ref 8.5–10.1)
CHLORIDE SERPL-SCNC: 105 MMOL/L (ref 98–112)
CHOLEST SERPL-MCNC: 158 MG/DL (ref ?–200)
CO2 SERPL-SCNC: 29 MMOL/L (ref 21–32)
CREAT BLD-MCNC: 1.02 MG/DL
CREAT UR-SCNC: 124 MG/DL
EGFRCR SERPLBLD CKD-EPI 2021: 89 ML/MIN/1.73M2 (ref 60–?)
EST. AVERAGE GLUCOSE BLD GHB EST-MCNC: 186 MG/DL (ref 68–126)
FASTING PATIENT LIPID ANSWER: NO
FASTING STATUS PATIENT QL REPORTED: NO
GLOBULIN PLAS-MCNC: 4 G/DL (ref 2.8–4.4)
GLUCOSE BLD-MCNC: 205 MG/DL (ref 70–99)
HBA1C MFR BLD: 8.1 % (ref ?–5.7)
HDLC SERPL-MCNC: 51 MG/DL (ref 40–59)
LDLC SERPL CALC-MCNC: 94 MG/DL (ref ?–100)
MICROALBUMIN UR-MCNC: 74.4 MG/DL
MICROALBUMIN/CREAT 24H UR-RTO: 600 UG/MG (ref ?–30)
NONHDLC SERPL-MCNC: 107 MG/DL (ref ?–130)
OSMOLALITY SERPL CALC.SUM OF ELEC: 294 MOSM/KG (ref 275–295)
POTASSIUM SERPL-SCNC: 4.2 MMOL/L (ref 3.5–5.1)
PROT SERPL-MCNC: 8.2 G/DL (ref 6.4–8.2)
SODIUM SERPL-SCNC: 139 MMOL/L (ref 136–145)
TRIGL SERPL-MCNC: 63 MG/DL (ref 30–149)
VLDLC SERPL CALC-MCNC: 10 MG/DL (ref 0–30)

## 2024-05-20 PROCEDURE — 36415 COLL VENOUS BLD VENIPUNCTURE: CPT

## 2024-05-20 PROCEDURE — 3079F DIAST BP 80-89 MM HG: CPT | Performed by: FAMILY MEDICINE

## 2024-05-20 PROCEDURE — 83036 HEMOGLOBIN GLYCOSYLATED A1C: CPT

## 2024-05-20 PROCEDURE — 80061 LIPID PANEL: CPT

## 2024-05-20 PROCEDURE — 82570 ASSAY OF URINE CREATININE: CPT

## 2024-05-20 PROCEDURE — 80053 COMPREHEN METABOLIC PANEL: CPT

## 2024-05-20 PROCEDURE — 82043 UR ALBUMIN QUANTITATIVE: CPT

## 2024-05-20 PROCEDURE — 3075F SYST BP GE 130 - 139MM HG: CPT | Performed by: FAMILY MEDICINE

## 2024-05-20 PROCEDURE — 3008F BODY MASS INDEX DOCD: CPT | Performed by: FAMILY MEDICINE

## 2024-05-20 PROCEDURE — 3060F POS MICROALBUMINURIA REV: CPT | Performed by: FAMILY MEDICINE

## 2024-05-20 PROCEDURE — 99214 OFFICE O/P EST MOD 30 MIN: CPT | Performed by: FAMILY MEDICINE

## 2024-05-20 PROCEDURE — 3052F HG A1C>EQUAL 8.0%<EQUAL 9.0%: CPT | Performed by: FAMILY MEDICINE

## 2024-05-20 RX ORDER — AMLODIPINE BESYLATE 5 MG/1
5 TABLET ORAL 2 TIMES DAILY
Qty: 60 TABLET | Refills: 3 | Status: SHIPPED | OUTPATIENT
Start: 2024-05-20 | End: 2024-06-19

## 2024-05-20 RX ORDER — FLURBIPROFEN SODIUM 0.3 MG/ML
SOLUTION/ DROPS OPHTHALMIC
Qty: 100 EACH | Refills: 2 | Status: SHIPPED | OUTPATIENT
Start: 2024-05-20

## 2024-05-20 NOTE — PROGRESS NOTES
Farzad Romo is a 49 year old male who presents for   Chief Complaint   Patient presents with    Follow - Up     [facility] f/u- 05/18/24 for HTN. Started pt on Amlodipine 5 mg daily but pt has been taking BID.      HPI:   Patient's presenting for follow up from Hospital     Patient was in Hospital/ER/WIC: date(s) 5/18/24    Patient reports overall improvement.     Medication: amlodipine, with BP around 130/80 with higher range of 150/90  Patient's presenting for diabetes check.  Patient has been compliant with medication and diet.  Patient's last OPTHO exam was less than 1 year ago.  He reports no foot ulcers and reports normal sensation to lower extremities.   He reports his sugars have been running in 120's  He denies any hypoglycemic episodes and reports no urinary complaints.    Patient denies any shortness of breath, denies chest pain and denies any recent fevers or chills.  Patient reports no urinary complaints and denies headaches or visual disturbances.   Patient denies any abdominal pain at this time. Patient has no new skin lesions.    Wt Readings from Last 6 Encounters:   05/20/24 196 lb (88.9 kg)   05/17/24 190 lb (86.2 kg)   05/16/24 190 lb (86.2 kg)   05/15/24 190 lb (86.2 kg)   01/25/24 187 lb (84.8 kg)   01/02/24 192 lb (87.1 kg)     Body mass index is 27.34 kg/m².     Cholesterol, Total (mg/dL)   Date Value   01/25/2024 172   09/23/2022 164   07/19/2021 145     CHOLESTEROL, TOTAL (mg/dL)   Date Value   12/30/2014 291 (H)     HDL Cholesterol (mg/dL)   Date Value   01/25/2024 46   09/23/2022 47   07/19/2021 48     HDL CHOLESTEROL (mg/dL)   Date Value   12/30/2014 47     LDL Cholesterol (mg/dL)   Date Value   01/25/2024 106 (H)   09/23/2022 106 (H)   07/19/2021 86     LDL-CHOLESTEROL (mg/dL (calc))   Date Value   12/30/2014 223 (H)     AST (U/L)   Date Value   05/18/2024 19   05/15/2024 23   01/25/2024 20   12/30/2014 18     ALT   Date Value   05/18/2024 27 U/L   05/15/2024 32 U/L   01/25/2024       Comment:     Due to  backorder we are temporarily unable to offer hospital-based ALT testing at [facility].   If urgently needed, please order ALT test code [id number].   The new order will need a new venipuncture and will be sent to [facility] for testing.   The expected turnaround time will be within 24 hours.    12/30/2014 23 U/L      Current Outpatient Medications   Medication Sig Dispense Refill    Insulin Pen Needle (BD PEN NEEDLE MINI U/F) 31G X 5 MM Does not apply Misc USE WITH SEMGLEE DAILY AS DIRECTED 100 each 2    Blood Pressure Monitoring Does not apply Device Monitor blood pressure daily 1 each 0    amLODIPine 5 MG Oral Tab Take 1 tablet (5 mg total) by mouth daily. 30 tablet 0    benzonatate 200 MG Oral Cap Take 1 capsule (200 mg total) by mouth 3 (three) times daily as needed for cough. 30 capsule 0    atorvastatin 20 MG Oral Tab Take 1 tablet (20 mg total) by mouth nightly. 90 tablet 0    losartan 100 MG Oral Tab Take 1 tablet (100 mg total) by mouth daily. 90 tablet 0    Insulin Glargine-yfgn (SEMGLEE, YFGN,) 100 UNIT/ML Subcutaneous Solution Pen-injector Inject 25 Units into the skin nightly. 9 mL 3    montelukast 10 MG Oral Tab Take 1 tablet (10 mg total) by mouth nightly. 90 tablet 3    metFORMIN HCl 1000 MG Oral Tab Take 1 tablet (1,000 mg total) by mouth 2 (two) times daily. 180 tablet 1    Blood Pressure Monitoring (BLOOD PRESSURE KIT) Does not apply Device 1 each daily. 1 each 2    semaglutide (OZEMPIC, 0.25 OR 0.5 MG/DOSE,) 2 MG/1.5ML Subcutaneous Solution Pen-injector Inject 0.25 mg into the skin every 7 days. 1.5 mL 0    Blood Glucose Monitoring Suppl (CONTOUR NEXT EZ) w/Device Does not apply Kit 1 kit by Does not apply route daily. 1 kit 0    MICROLET LANCETS Does not apply Misc TEST THREE TIMES DAILY 300 each 0    CONTOUR NEXT TEST In Vitro Strip USE THREE TIMES DAILY 300 strip 0    aspirin (ASPIR-81) 81 MG Oral Tab EC Take 1 tablet by mouth daily. 1 tablet 0     cyanocobalamin ([facility] VITAMIN B12) 1000 MCG Oral Tab Take 1 tablet (1,000 mcg total) by mouth daily. (Patient not taking: Reported on 5/20/2024) 30 tablet 3      Past Medical History:    Essential hypertension    Type II or unspecified type diabetes mellitus without mention of complication, not stated as uncontrolled      Past Surgical History:   Procedure Laterality Date    Appendectomy  10 yrs ago      Family History   Problem Relation Age of Onset    Hypertension Father     Diabetes Father     Hypertension Mother     Diabetes Mother     Diabetes Sister     Diabetes Sister     Diabetes Brother     Dementia Maternal Aunt     Dementia Maternal Aunt     Dementia Maternal Aunt       Social History:  Social History     Socioeconomic History    Marital status: Single   Tobacco Use    Smoking status: Never     Passive exposure: Never    Smokeless tobacco: Never   Vaping Use    Vaping status: Never Used   Substance and Sexual Activity    Alcohol use: Not Currently     Comment: rarely    Drug use: No   Other Topics Concern    Caffeine Concern No     Comment: very little    Exercise Yes     Comment: 4x per week    Seat Belt Yes    Special Diet No    Stress Concern Yes    Weight Concern No           A comprehensive 10 point review of systems was completed.  Pertinent positives and negatives noted in the the HPI.    /80   Pulse 75   Temp 98.1 °F (36.7 °C)   Resp 16   Ht 5' 11\" (1.803 m)   Wt 196 lb (88.9 kg)   SpO2 98%   BMI 27.34 kg/m²   Body mass index is 27.34 kg/m².   Physical Exam  Constitutional:       Appearance: Normal appearance.   HENT:      Head: Normocephalic and atraumatic.      Nose: No congestion or rhinorrhea.   Eyes:      General:         Right eye: No discharge.         Left eye: No discharge.   Cardiovascular:      Rate and Rhythm: Normal rate.      Pulses: Normal pulses.      Heart sounds: No murmur heard.  Pulmonary:      Effort: Pulmonary effort is normal. No respiratory distress.   Abdominal:       General: Abdomen is flat. There is no distension.   Musculoskeletal:      Cervical back: No rigidity or tenderness.   Skin:     Coloration: Skin is not jaundiced or pale.   Neurological:      Mental Status: He is alert.      Cranial Nerves: No cranial nerve deficit.      Sensory: No sensory deficit.   Psychiatric:         Mood and Affect: Mood normal.         Behavior: Behavior normal.         Thought Content: Thought content normal.         Judgment: Judgment normal.             ASSESSMENT AND PLAN:   Farzad Romo is a 49 year old male who presents for hospital/ER follow up, Hospital notes, consultant notes, and labs and imaging was reviewed with patient:     1. Uncontrolled type 2 diabetes mellitus with hyperglycemia (HCC)  -due for labs. Improved.   - Microalb/Creat Ratio, Random Urine; Future  - Hemoglobin A1C; Future  - Comp Metabolic Panel (14); Future  - Lipid Panel; Future  - Insulin Pen Needle (BD PEN NEEDLE MINI U/F) 31G X 5 MM Does not apply Misc; USE WITH SEMGLEE DAILY AS DIRECTED  Dispense: 100 each; Refill: 2    2. Pure hypercholesterolemia with target low density lipoprotein (LDL) cholesterol less than 130 mg/dL  -as above.     3. Hypertensive urgency-average around 140/90-if running higher, can take two amlodipine   -monitor for now, will take amlodipine 5 mg q daily and BID based on BP parameters, given parameters to patient.       Follow up in 1-3 months, sooner prn.

## 2024-05-21 ENCOUNTER — TELEPHONE (OUTPATIENT)
Dept: CASE MANAGEMENT | Age: 49
End: 2024-05-21

## 2024-05-21 ENCOUNTER — PATIENT MESSAGE (OUTPATIENT)
Dept: FAMILY MEDICINE CLINIC | Facility: CLINIC | Age: 49
End: 2024-05-21

## 2024-05-21 DIAGNOSIS — I10 PRIMARY HYPERTENSION: ICD-10-CM

## 2024-05-21 DIAGNOSIS — R03.0 ELEVATED BLOOD PRESSURE READING: Primary | ICD-10-CM

## 2024-05-21 NOTE — TELEPHONE ENCOUNTER
Hello     Patient left message requesting a referral for a blood pressure monitor.     Please sign off if agree with plan of care.     Please advise    Thank you,  Sinclair  Referral specialist

## 2024-05-31 DIAGNOSIS — I10 PRIMARY HYPERTENSION: ICD-10-CM

## 2024-05-31 RX ORDER — LOSARTAN POTASSIUM 100 MG/1
100 TABLET ORAL DAILY
Qty: 90 TABLET | Refills: 0 | Status: SHIPPED | OUTPATIENT
Start: 2024-05-31

## 2024-06-07 ENCOUNTER — OFFICE VISIT (OUTPATIENT)
Dept: FAMILY MEDICINE CLINIC | Facility: CLINIC | Age: 49
End: 2024-06-07
Payer: COMMERCIAL

## 2024-06-07 DIAGNOSIS — J30.2 SEASONAL ALLERGIES: ICD-10-CM

## 2024-06-07 DIAGNOSIS — E11.65 UNCONTROLLED TYPE 2 DIABETES MELLITUS WITH HYPERGLYCEMIA (HCC): ICD-10-CM

## 2024-06-07 PROCEDURE — 99214 OFFICE O/P EST MOD 30 MIN: CPT | Performed by: FAMILY MEDICINE

## 2024-06-07 RX ORDER — MONTELUKAST SODIUM 10 MG/1
10 TABLET ORAL NIGHTLY
Qty: 30 TABLET | Refills: 5 | Status: SHIPPED | OUTPATIENT
Start: 2024-06-07

## 2024-06-07 RX ORDER — FLURBIPROFEN SODIUM 0.3 MG/ML
SOLUTION/ DROPS OPHTHALMIC
Qty: 100 EACH | Refills: 2 | Status: SHIPPED | OUTPATIENT
Start: 2024-06-07

## 2024-06-07 RX ORDER — ATORVASTATIN CALCIUM 20 MG/1
20 TABLET, FILM COATED ORAL NIGHTLY
Qty: 30 TABLET | Refills: 5 | Status: SHIPPED | OUTPATIENT
Start: 2024-06-07

## 2024-06-07 NOTE — PROGRESS NOTES
CC: BP follow-up    HPI:   Farzad Romo is a 48 yo M, with past medical history significant for HTN, diabetes and hypercholesterolemia, who presents to clinic today for BP follow-up.     Pt states he went to ED for hypertensive symptoms and urgency on 5/15/24 (184/90), 5/16/24 (146/97), and 5/18/24 (157/91). Pt admitted having cold symptoms and was dx and tx for URI at 5/15/24 visit, and additionally admitted headaches, feeling hot, and dizziness, constistent with symptomatic hypertension. Pt denied mental status change, weakness, numbness and tingling of extremities, slurred speech, facial droop, chest pain and syncope. Pt had already been taking losartan 100mg daily for HTN prior to these episodes. Pt was prescribed Amlodipine 5mg BID at ER d/c and f/u with PCP 5/20/24.    Presently, pt states he's feeling much better. Pt denies feeling elevated BP, HA, dizziness, photophobia, lightheaded, orthostatic changes, palpitations, chest pain, or visual disturbances. Pt states he has been compliant with his medications, taking 5mg amlodipine once in the morning and once at night, and 100mg losartan once daily. Pt states he didn't take his amlodipine this morning because he \"wanted to check his BP without taking it\" at today's visit (130/84). Pt states he doesn't believe he's experienced any side effects of amlodipine since initiating treatment. Pt additionally states he hasn't been taking/recording his BP at home due to issues with insurance covering the cuff ordered for him. Pt endorses family hx of HTN.     PMH:  Uncontrolled type 2 diabetes mellitus with hyperglycemia  Pure hypercholesterolemia with target LDL cholesterol less than 130 mg/dL  Essential Hypertension  Seasonal allergies    MEDICATION:  Amlodipine 5mg oral tab  Aspirin 81 mg  Atorvastatin 20mg  Cyanocobalamin 1000mcg  Insulin Glargine-yfgn 100 unit/mL subcutaneous solution pen-injector  Losartan 100mg  Metformin HCl 1000 mg  Montelukast 10  mg  Semaglutide 2mg/1.5mL subcutaneous solution pen-injector    ALLERGY:  Almonds  Seasonal    SHX:  Appendectomy, 10 yr ago  No hospitalizations since previous visit    FHX:  Father, HTN & diabetes  Mother, HTN & diabetes  Sister, diabetes  Sister, diabetes  Brother, diabetes  Maternal aunt, dementia    SOCIAL HX:  Tobacco use/smoking: never  Vaping/e-cig use: never  Alcohol use: not currently  Drug use: denies  Exercise: yes, weekly    ROS:  GENERAL HEALTH:  feels well; normal appetite and slightly decreased energy level  SKIN: no skin changes, growths or rashes  EYES: no complaints or change in vision  HEENT: no headache, nasal congestion, sinus pain, sore throat or hearing loss  RESPIRATORY: no shortness of breath, wheezing or cough   CARDIOVASCULAR: no chest pain or PATEL; no palpitations   GI: no nausea, vomiting, constipation, diarrhea, acid reflux or rectal bleeding  MUSCULOSKELETAL: no joint complaints upper or lower extremities  NEURO: no sensory or motor complaint  PSYCHE: denies depression or anxiety    VITAL SIGNS:  /84 / Pulse 82 / T 98.2F / RR 16 / Ht 5' 11\" / Wt 197lbs / BMI 27.48    Physical Exam:  GENERAL: well developed, well nourished, in no apparent distress  SKIN: no rashes, no suspicious lesions, or petechia  EYES: DAMEON, EOM full, sclera white, conjunctivae clear  HEENT: normocephalic; normal turbinates, throat and TM's  NECK: supple; no JVD, normal thyroid, no carotid bruits  RESPIRATORY: lungs clear to auscultation  CARDIOVASCULAR: S1, S2; regular rhythm; no murmur   ABDOMEN: normal BS+, soft, nontender, not distended; no masses  BACK: normal spine  EXTREMITIES: no cyanosis, clubbing or edema, peripheral pulses intact  NEUROLOGIC: intact; no sensorimotor deficit; reflexes normal  PSYCHIATRIC: alert and oriented x 3; affect appropriate    1. Essential Hypertension  -monitor for now, will take amlodipine 5 mg q daily and BID based on BP parameters, given parameters to patient.   -  continue taking all medication as prescribed    2. Uncontrolled type 2 diabetes mellitus with hyperglycemia (HCC)  - pt has been recording sugars at home, last recorded sugar 130 yesterday morning  - Lab order: A1C  - Glucose blood (contour next test) in vitro strip, 1 each by other route 3 (three) times daily, disp-100 strips, R-5  - Insulin pen needle (BD pen needle mini u/f) [id number]  - continue monitoring sugars at home  - continue with lifestyle management: exercising and diet of high protein and low carbs     3. Pure hypercholesterolemia with target low density lipoprotein (LDL) cholesterol less than 130 mg/dL  - stable, continue with care plan    All pt questions answered  Pt verbalized understanding of visit details and post-visit instructions  RTC in 3 months, or sooner as needed    06/07/24 NEWTON AbadS

## 2024-06-08 DIAGNOSIS — E11.65 UNCONTROLLED TYPE 2 DIABETES MELLITUS WITH HYPERGLYCEMIA (HCC): ICD-10-CM

## 2024-06-10 NOTE — PROGRESS NOTES
Chief Complaint   Patient presents with    Follow - Up     Diabetic f/u- Insurance BP monitor not having luck and has found a med equipment company that his insurance says will cover. [facility] has the DME order and will work on this.        Farzad Romo is a 49 year old year old who presents for recheck of diabetes.   Patient's blood pressure is under control.   Patient's eye exam: UTD  Last A1C was at goal, improving   Compliance to diet: improving  Compliance to exercise: yes  Compliance to taking all medication: yes  Patient has no history of diabetic foot ulcer.     Pt complains of elevated blood pressure-better with amlodipine at 5 mg twice a day, thinking of taking once a day and possibly stopping.    Last A1c value was 8.1% done 5/20/2024.     Patient denies shortness of breath, denies chest pain and denies any recent fevers or chills.    Patient reports no urinary complaints and denies headaches or visual disturbances.   Patient denies any abdominal pain at this time. Patient has no new skin lesions.  Patient reports no acute back pain and reports no dizziness or headaches.   Patient reports no visual disturbances and reports hearing has been about the same.   Patient reports no recent injury or trauma.          Latest Ref Rng & Units 6/10/2024    10:01 AM 6/7/2024    10:43 AM 5/31/2024    12:00 AM 5/20/2024     9:28 AM 5/20/2024     9:00 AM 5/18/2024     4:14 AM 5/18/2024    12:55 AM   DIABETIC FLOWSHEET ([facility])   BMI   27.48 kg/m2   27.34 kg/m2     Hgb A1c <5.7 %    8.1       Cholesterol Total <200 mg/dL    158       Triglycerides 30 - 149 mg/dL    63       HDL 40 - 59 mg/dL    51       LDL <100 mg/dL    94       Microalbumin Urine mg/dL    74.40       Microalb/Creatinine Calc <=30.0 ug/mg    600.0       HEMOGLOBIN A1c <5.7 %    8.1       Losartan Potassium  100 mg Daily OR    100 mg Daily OR     100 mg Daily OR     100 mg Daily OR     100 mg Daily OR     100 mg Daily OR     100 mg Daily OR       Weight  (enc vitals)   197 lb   196 lb     BP (enc vitals)   130/84   134/80 146/84 155/92         5/17/2024    11:32 PM   DIABETIC FLOWSHEET ([facility])   BMI 26.5 kg/m2   Hgb A1c    Cholesterol Total    Triglycerides    HDL    LDL    Microalbumin Urine    Microalb/Creatinine Calc    HEMOGLOBIN A1c    Losartan Potassium 100 mg Daily OR       Weight (enc vitals) 190 lb   BP (enc vitals) 157/91       Medication marked as long-term       Current medications:   Current Outpatient Medications:     atorvastatin 20 MG Oral Tab, Take 1 tablet (20 mg total) by mouth nightly., Disp: 30 tablet, Rfl: 5    Glucose Blood (CONTOUR NEXT TEST) In Vitro Strip, 1 each by Other route 3 (three) times daily., Disp: 100 strip, Rfl: 5    Insulin Pen Needle (BD PEN NEEDLE MINI U/F) 31G X 5 MM Does not apply Misc, USE WITH SEMGLEE DAILY AS DIRECTED, Disp: 100 each, Rfl: 2    montelukast 10 MG Oral Tab, Take 1 tablet (10 mg total) by mouth nightly., Disp: 30 tablet, Rfl: 5    LOSARTAN 100 MG Oral Tab, TAKE 1 TABLET(100 MG) BY MOUTH DAILY, Disp: 90 tablet, Rfl: 0    Blood Pressure Monitor Does not apply Device, Use to monitor blood pressure at home, Disp: 1 each, Rfl: 0    amLODIPine 5 MG Oral Tab, Take 1 tablet (5 mg total) by mouth in the morning and 1 tablet (5 mg total) before bedtime., Disp: 60 tablet, Rfl: 3    Blood Pressure Monitoring Does not apply Device, Monitor blood pressure daily, Disp: 1 each, Rfl: 0    Insulin Glargine-yfgn (SEMGLEE, YFGN,) 100 UNIT/ML Subcutaneous Solution Pen-injector, Inject 25 Units into the skin nightly., Disp: 9 mL, Rfl: 3    metFORMIN HCl 1000 MG Oral Tab, Take 1 tablet (1,000 mg total) by mouth 2 (two) times daily., Disp: 180 tablet, Rfl: 1    Blood Pressure Monitoring (BLOOD PRESSURE KIT) Does not apply Device, 1 each daily., Disp: 1 each, Rfl: 2    semaglutide (OZEMPIC, 0.25 OR 0.5 MG/DOSE,) 2 MG/1.5ML Subcutaneous Solution Pen-injector, Inject 0.25 mg into the skin every 7 days., Disp: 1.5 mL, Rfl: 0     cyanocobalamin ([employer] VITAMIN B12) 1000 MCG Oral Tab, Take 1 tablet (1,000 mcg total) by mouth daily., Disp: 30 tablet, Rfl: 3    Blood Glucose Monitoring Suppl (CONTOUR NEXT EZ) w/Device Does not apply Kit, 1 kit by Does not apply route daily., Disp: 1 kit, Rfl: 0    MICROLET LANCETS Does not apply Misc, TEST THREE TIMES DAILY, Disp: 300 each, Rfl: 0    aspirin (ASPIR-81) 81 MG Oral Tab EC, Take 1 tablet by mouth daily., Disp: 1 tablet, Rfl: 0    benzonatate 200 MG Oral Cap, Take 1 capsule (200 mg total) by mouth 3 (three) times daily as needed for cough., Disp: 30 capsule, Rfl: 0     Last documented BP: (P) 130/84    HEMOGLOBIN A1C (%)   Date Value   01/10/2019 10.6 (A)   12/12/2016 8.5 (A)   09/26/2016 8.0 (A)     HgbA1C (%)   Date Value   05/20/2024 8.1 (H)   01/25/2024 11.7 (H)   09/23/2022 10.0 (H)     LDL Cholesterol (mg/dL)   Date Value   05/20/2024 94   01/25/2024 106 (H)   09/23/2022 106 (H)     LDL-CHOLESTEROL (mg/dL (calc))   Date Value   12/30/2014 223 (H)     AST (U/L)   Date Value   05/20/2024 15   05/18/2024 19   05/15/2024 23   12/30/2014 18     ALT (U/L)   Date Value   05/20/2024 30   05/18/2024 27   05/15/2024 32   12/30/2014 23        reports that he has never smoked. He has never been exposed to tobacco smoke. He has never used smokeless tobacco.    Counseling given: Not Answered      Immunization History   Administered Date(s) Administered    Covid-19 Vaccine Moderna 100 mcg/0.5 ml 04/08/2021, 05/24/2021    Covid-19 Vaccine Moderna 50 Mcg/0.25 Ml 11/26/2021    Covid-19 Vaccine Moderna Bivalent 50mcg/0.5mL 12+ years 11/27/2022    FLULAVAL 6 months & older 0.5 ml Prefilled syringe (10103) 11/28/2017, 10/19/2020, 11/18/2021    FLUZONE 6 months and older PFS 0.5 ml (45757) 10/20/2015, 10/31/2016, 11/28/2017, 10/19/2020, 11/18/2021    Influenza 10/30/2014    Pneumococcal Conjugate PCV20 07/15/2022    TDAP 12/30/2009, 06/27/2023    Zoster Vaccine Recombinant Adjuvanted (Shingrix) 06/27/2023, 12/14/2023        Review of Systems   Constitutional: Negative for fever, chills and fatigue. No distress.  HENT: Negative for hearing loss, congestion, sore throat, neck pain and dental problem.    Eyes: Negative for pain and visual disturbance.   Respiratory: Negative for cough, chest tightness, shortness of breath and wheezing.    Cardiovascular: Negative for chest pain, palpitations and leg swelling.   Gastrointestinal: Negative for nausea, vomiting, abdominal pain, diarrhea, blood in stool and abdominal distention.   Genitourinary: Negative for dysuria, hematuria and difficulty urinating.   Musculoskeletal: Negative for myalgias, back pain, joint swelling, arthralgias and gait problem.   Skin: Negative for color change and rash.   Neurological: Negative for dizziness, syncope, weakness, numbness, tingling and headaches.   Hematological: Negative for adenopathy. Does not bruise/bleed easily.   Psychiatric/Behavioral: The patient is not nervous/anxious. No depression.    Patient Active Problem List   Diagnosis    Pure hypercholesterolemia with target low density lipoprotein (LDL) cholesterol less than 130 mg/dL    Seasonal allergies    Uncontrolled type 2 diabetes mellitus with hyperglycemia (HCC)    Vitamin D deficiency    Polyp of colon       Current Outpatient Medications   Medication Sig Dispense Refill    atorvastatin 20 MG Oral Tab Take 1 tablet (20 mg total) by mouth nightly. 30 tablet 5    Glucose Blood (CONTOUR NEXT TEST) In Vitro Strip 1 each by Other route 3 (three) times daily. 100 strip 5    Insulin Pen Needle (BD PEN NEEDLE MINI U/F) 31G X 5 MM Does not apply Misc USE WITH SEMGLEE DAILY AS DIRECTED 100 each 2    montelukast 10 MG Oral Tab Take 1 tablet (10 mg total) by mouth nightly. 30 tablet 5    LOSARTAN 100 MG Oral Tab TAKE 1 TABLET(100 MG) BY MOUTH DAILY 90 tablet 0    Blood Pressure Monitor Does not apply Device Use to monitor blood pressure at home 1 each 0    amLODIPine 5 MG Oral Tab Take 1 tablet (5 mg  total) by mouth in the morning and 1 tablet (5 mg total) before bedtime. 60 tablet 3    Blood Pressure Monitoring Does not apply Device Monitor blood pressure daily 1 each 0    Insulin Glargine-yfgn (SEMGLEE, YFGN,) 100 UNIT/ML Subcutaneous Solution Pen-injector Inject 25 Units into the skin nightly. 9 mL 3    metFORMIN HCl 1000 MG Oral Tab Take 1 tablet (1,000 mg total) by mouth 2 (two) times daily. 180 tablet 1    Blood Pressure Monitoring (BLOOD PRESSURE KIT) Does not apply Device 1 each daily. 1 each 2    semaglutide (OZEMPIC, 0.25 OR 0.5 MG/DOSE,) 2 MG/1.5ML Subcutaneous Solution Pen-injector Inject 0.25 mg into the skin every 7 days. 1.5 mL 0    cyanocobalamin ([employer]S VITAMIN B12) 1000 MCG Oral Tab Take 1 tablet (1,000 mcg total) by mouth daily. 30 tablet 3    Blood Glucose Monitoring Suppl (CONTOUR NEXT EZ) w/Device Does not apply Kit 1 kit by Does not apply route daily. 1 kit 0    MICROLET LANCETS Does not apply Misc TEST THREE TIMES DAILY 300 each 0    aspirin (ASPIR-81) 81 MG Oral Tab EC Take 1 tablet by mouth daily. 1 tablet 0    benzonatate 200 MG Oral Cap Take 1 capsule (200 mg total) by mouth 3 (three) times daily as needed for cough. 30 capsule 0       Past Medical History:    Essential hypertension    Type II or unspecified type diabetes mellitus without mention of complication, not stated as uncontrolled     Past Surgical History:   Procedure Laterality Date    Appendectomy  10 yrs ago     Family History   Problem Relation Age of Onset    Hypertension Father     Diabetes Father     Hypertension Mother     Diabetes Mother     Diabetes Sister     Diabetes Sister     Diabetes Brother     Dementia Maternal Aunt     Dementia Maternal Aunt     Dementia Maternal Aunt      Social History     Socioeconomic History    Marital status: Single   Tobacco Use    Smoking status: Never     Passive exposure: Never    Smokeless tobacco: Never   Vaping Use    Vaping status: Never Used   Substance and Sexual Activity     Alcohol use: Not Currently     Comment: rarely    Drug use: No   Other Topics Concern    Caffeine Concern No     Comment: very little    Exercise Yes     Comment: 4x per week    Seat Belt Yes    Special Diet No    Stress Concern Yes    Weight Concern No       Physical Exam  BP (P) 130/84   Pulse (P) 82   Temp (P) 98.2 °F (36.8 °C)   Resp (P) 16   Ht (P) 5' 11\" (1.803 m)   Wt (P) 197 lb (89.4 kg)   SpO2 (P) 96%   BMI (P) 27.48 kg/m²   Constitutional: Alert and Oriented x 3.   HEENT:  Normocephalic and atraumatic. Hearing is normal. Nose normal. Oropharynx is clear and moist.   Eyes: Conjunctivae is normal.   Neck: Normal range of motion. Neck supple. Normal carotid pulses and no JVD present. No edema present. No mass and no thyromegaly present.   Cardiovascular: Normal rate, regular rhythm and intact distal pulses.  No murmur, rubs or gallops.   Pulmonary/Chest: no respiratory distress. Lungs are clear.   Abdominal: Soft. Bowel sounds are normal. Non tender  Musculoskeletal: Normal range of motion.   Neurological: Normal reflexes. No cranial nerve deficit or sensory deficit. normal muscle tone. Coordination normal.   Ext: peripheral pulses normal, no pedal edema, no clubbing or cyanosis, feet normal, good pulses, normal color, temperature and sensation, monofilament sensory exam is normal in both feet, no edema, redness or tenderness in the calves or thighs    Skin: Skin is warm and dry  Psychiatric: Normal mood and affect.       A/P:    1. Uncontrolled type 2 diabetes mellitus with hyperglycemia (HCC)  -improving, CPM, will see diabetic clinic in July  - atorvastatin 20 MG Oral Tab; Take 1 tablet (20 mg total) by mouth nightly.  Dispense: 30 tablet; Refill: 5  - Glucose Blood (CONTOUR NEXT TEST) In Vitro Strip; 1 each by Other route 3 (three) times daily.  Dispense: 100 strip; Refill: 5  - Insulin Pen Needle (BD PEN NEEDLE MINI U/F) 31G X 5 MM Does not apply Misc; USE WITH SEMGLEE DAILY AS DIRECTED  Dispense:  100 each; Refill: 2  - Hemoglobin A1C [E]; Future    2. Seasonal allergies  -allergy rx.   - montelukast 10 MG Oral Tab; Take 1 tablet (10 mg total) by mouth nightly.  Dispense: 30 tablet; Refill: 5       Diabetes Education:  Diabetes disease process, Nutritional Management, Physical Activity, Using Medications, Monitoring, Preventing Acute Complications, Preventing Chronic Complications, Behavior Change Strategies, Risk Reduction Strategies   Reducing Risk: Daily foot checks, BP Control, Lipid Control, Dilated eye exam, Skin/dental care, Urine for microalbumin, Weight Control  Medication: Take at appropriate times, Take prescribed dose  Healthy Eating: Evenly spaced carb balanced meals, No skipped meals, Increase fiber, fruits and veggies     Pt verbalized understanding and has no further questions at this time.  Over 30 minutes was spent with patient reviewing medication, reviewing labs, and medical plan.    Greater than 50% of visit spent on education and counseling.  Office Follow up visit:  3-6 months.

## 2024-06-19 ENCOUNTER — TELEPHONE (OUTPATIENT)
Dept: FAMILY MEDICINE CLINIC | Facility: CLINIC | Age: 49
End: 2024-06-19

## 2024-06-19 NOTE — TELEPHONE ENCOUNTER
DME order for BP pressure monitor placed on 05/23 for [facility].   No forms in triage will discuss with Dr. Guaman's MA   Provider and MA not in clinic today

## 2024-06-19 NOTE — TELEPHONE ENCOUNTER
Patient called to check the status of a request for a BP cuff - last time patient was in to see doctor he left some forms with Dr. Guaman who said he gave the forms to the nurse.  Please call patient.

## 2024-06-20 NOTE — TELEPHONE ENCOUNTER
Orders, Referral Auth, & LOV notes faxed to [facility] at [phone], confirmation received. Will call [facility] tomorrow to f/u.

## 2024-06-25 NOTE — TELEPHONE ENCOUNTER
I attempted to contact [facility] to f/u on order for BP cuff, no answer. Message sent pt to see if he received BP cuff/machine yet.

## 2024-06-27 ENCOUNTER — TELEPHONE (OUTPATIENT)
Dept: FAMILY MEDICINE CLINIC | Facility: CLINIC | Age: 49
End: 2024-06-27

## 2024-06-27 NOTE — TELEPHONE ENCOUNTER
Rcvd incoming fax results from [facility] for Diabetic Eye exam. Will document, file and have Provider sign.

## 2024-06-27 NOTE — TELEPHONE ENCOUNTER
Spoke to [facility] and she states they do not handle blood pressure monitors.     Spoke to patient about above and he said he is frustrated and had talked to a supervisor at [facility] and how did they not know that they do not cover nor carry this product.      Patient is going to either purchase one OTC or call [facility] to discuss with them if they may know where he could go to purchase one

## 2024-06-28 NOTE — TELEPHONE ENCOUNTER
[facility] called stating they don't handle blood pressure monitors and they will fax over the same response to our request.

## 2024-07-02 ENCOUNTER — OFFICE VISIT (OUTPATIENT)
Dept: ENDOCRINOLOGY CLINIC | Facility: CLINIC | Age: 49
End: 2024-07-02
Payer: COMMERCIAL

## 2024-07-02 VITALS
DIASTOLIC BLOOD PRESSURE: 86 MMHG | HEART RATE: 84 BPM | WEIGHT: 196 LBS | OXYGEN SATURATION: 98 % | RESPIRATION RATE: 20 BRPM | BODY MASS INDEX: 27 KG/M2 | SYSTOLIC BLOOD PRESSURE: 126 MMHG

## 2024-07-02 DIAGNOSIS — E11.65 TYPE 2 DIABETES MELLITUS WITH HYPERGLYCEMIA, WITH LONG-TERM CURRENT USE OF INSULIN (HCC): Primary | ICD-10-CM

## 2024-07-02 DIAGNOSIS — E11.21 MACROALBUMINURIC DIABETIC NEPHROPATHY (HCC): ICD-10-CM

## 2024-07-02 DIAGNOSIS — Z79.4 TYPE 2 DIABETES MELLITUS WITH HYPERGLYCEMIA, WITH LONG-TERM CURRENT USE OF INSULIN (HCC): Primary | ICD-10-CM

## 2024-07-02 DIAGNOSIS — I10 PRIMARY HYPERTENSION: ICD-10-CM

## 2024-07-02 DIAGNOSIS — E78.2 MIXED HYPERLIPIDEMIA: ICD-10-CM

## 2024-07-02 PROCEDURE — 99214 OFFICE O/P EST MOD 30 MIN: CPT | Performed by: NURSE PRACTITIONER

## 2024-07-02 PROCEDURE — 3074F SYST BP LT 130 MM HG: CPT | Performed by: NURSE PRACTITIONER

## 2024-07-02 PROCEDURE — 3079F DIAST BP 80-89 MM HG: CPT | Performed by: NURSE PRACTITIONER

## 2024-07-02 RX ORDER — SEMAGLUTIDE 0.68 MG/ML
0.25 INJECTION, SOLUTION SUBCUTANEOUS WEEKLY
Qty: 3 ML | Refills: 0 | Status: SHIPPED | OUTPATIENT
Start: 2024-07-02

## 2024-07-02 RX ORDER — BLOOD-GLUCOSE SENSOR
1 EACH MISCELLANEOUS
Qty: 2 EACH | Refills: 11 | Status: SHIPPED | OUTPATIENT
Start: 2024-07-02

## 2024-07-02 NOTE — PATIENT INSTRUCTIONS
We are here to support you with Diabetes but please remember that you still need your primary care doctor for your routine health maintenance.   Your current A1C: 8.1%  This test provides us with your average blood sugar for the past 3 months.   The main goal of diabetes treatment is to keep your sugar from going too high. We measure your overall blood sugar trends with a Hemoglobin A1C test. (also called an A1C)  For most people the target is less than 7.0% but sometimes we make exceptions based on age, health history and other factors.   Keeping an A1C less than 7% helps prevents diabetes related health problems.   If your A1C goes too high, then we need to talk about changing your current diabetes treatment.    MEDICATIONS:   It is important to take all of your medications as prescribed.   Please call me if you cannot get the prescriptions filled or are having issues with refills.   Also, please call me if you have any issues with medication questions, side effects, dosing questions or problems with your blood sugar trends BEFORE CHANGING OR STOPPING ANY MEDICATIONS.   Continue Metformin 1000mg 2x/day with breakfast and dinner  Increase Semglee to 27 units injection once daily  Start Ozempic 0.25mg injection ONCE WEEKLY x 4 weeks then if tolerating increase to 0.5mg injection ONCE WEEKLY until follow up    Blood sugar testing:   Always bring your glucose meter or blood sugar logbook to every appointment here at the diabetes center.  This allows me to safely make adjustments to your diabetes plan.   In order for me to determine any patterns in your blood sugars, you will need to test your blood sugar 2 times daily   It would be best to change up the times of day that you are testing your sugar.   Always test before breakfast (fasting) and then alternate testing blood sugar 2 hours after your meals.   Call if 2 readings below 80 mg/dl in 1 week for medication adjustment.     Blood sugar targets:  Before breakfast:    (preferably < 110)  2 hours After meals: less than 180 (preferably less than 150)   Call for persistent blood sugars < 75 or > 200.   Blood sugars greater than 200 are not acceptable to reach your goal of improving diabetes      Health Maintenance:   1. LABS: It is important to monitor your kidney function (blood and urine protein levels) , liver function tests and cholesterol levels when you have diabetes.     2. FOOT EXAMS:  daily foot inspections for foot wounds or skin changes are important for foot care. Any unusual changes should be reported immediately.    3. EYE EXAMS: Checking your eyes for diabetes changes is important and you should have a dilated eye exam done by an eye doctor EVERY year since these changes occur in the BACK of the eye and not visible by you.  Please let me know if you need provider list for eye doctor.     Lifestyle Therapy:    1. NUTRITION: Maintain optimal weight, calorie restriction if overweight, plant-based diet    2. PHYSICAL ACTIVITY: 150 minutes per week (30 minutes a day 5 days a week) of moderate exertion such as walking, stair climbing.  Include strength training 2-3 times per week.  Increase as tolerated.    3. SLEEP: Try and get 7-8 hours of sleep per night    4. BEHAVIOR:  Tobacco cessation and alcohol in moderation        Laly PERAZA, BC-ADM, [facility]  [address] [place] [address]  [address], [address] [place] [address]  [address] [place] [address]  Diabetes Services at [facility]  T [phone]  F [phone]

## 2024-07-02 NOTE — PROGRESS NOTES
HPI:   Farzad Romo is a 49 year old male who presents for follow up for the management of his diabetes.  Patient has not been seen by provider since initial visit on 2022     Chief Complaint   Patient presents with    Diabetes     F/U - brought meter       Diabetes: needs improvement  Type: 2   Duration:dx   Current Meds: Metformin 1000mg po bid, Semglee 25 units injection daily  SE: denies  Long term insulin use: yes  Failed Meds/Previous Tx: Onglyza, glipizide, Lantus, Glyxambi, Tresiba, Basaglar, Ozempic - lack of follow up   Complications: Neuropathy  Proteinuria    Testing with: Contour Next   Monitoring blood glucose: inconsistent  Average glucose readin mg/dl  Highest glucose readin mg/dl    Lowest glucose readin mg/dl  Hypoglycemia frequency:  denies     Overall glucose control:   HGBA1C:    Lab Results   Component Value Date    A1C 8.1 (H) 2024    A1C 11.7 (H) 2024    A1C 10.0 (H) 2022     (H) 2024       Hypertension: stable  Blood Pressure: 126/86   Medication prescribed: losartan  SE: denies     Hyperlipidemia: stable, needs further improvement  LDL: 94   Tri  Medication prescribed: atorvastatin  SE: denies     Wt Readings from Last 3 Encounters:   24 196 lb (88.9 kg)   24 (P) 197 lb (89.4 kg)   24 196 lb (88.9 kg)     BP Readings from Last 3 Encounters:   24 126/86   24 (P) 130/84   24 134/80          Past History:   He  has a past medical history of Essential hypertension and Type II or unspecified type diabetes mellitus without mention of complication, not stated as uncontrolled.   His family history includes Dementia in his maternal aunt, maternal aunt, and maternal aunt; Diabetes in his brother, father, mother, sister, and sister; Hypertension in his father and mother.   He  reports that he has never smoked. He has never been exposed to tobacco smoke. He has never used smokeless tobacco. He  reports that he does not currently use alcohol. He reports that he does not use drugs.     He is allergic to almond, almond (diagnostic), and seasonal.     Current Outpatient Medications on File Prior to Visit   Medication Sig    METFORMIN HCL 1000 MG Oral Tab TAKE 1 TABLET(1000 MG) BY MOUTH TWICE DAILY    atorvastatin 20 MG Oral Tab Take 1 tablet (20 mg total) by mouth nightly.    Glucose Blood (CONTOUR NEXT TEST) In Vitro Strip 1 each by Other route 3 (three) times daily.    Insulin Pen Needle (BD PEN NEEDLE MINI U/F) 31G X 5 MM Does not apply Misc USE WITH SEMGLEE DAILY AS DIRECTED    montelukast 10 MG Oral Tab Take 1 tablet (10 mg total) by mouth nightly.    LOSARTAN 100 MG Oral Tab TAKE 1 TABLET(100 MG) BY MOUTH DAILY    Blood Pressure Monitor Does not apply Device Use to monitor blood pressure at home    Blood Pressure Monitoring Does not apply Device Monitor blood pressure daily    Insulin Glargine-yfgn (SEMGLEE, YFGN,) 100 UNIT/ML Subcutaneous Solution Pen-injector Inject 25 Units into the skin nightly.    Blood Pressure Monitoring (BLOOD PRESSURE KIT) Does not apply Device 1 each daily.    cyanocobalamin ([employer] VITAMIN B12) 1000 MCG Oral Tab Take 1 tablet (1,000 mcg total) by mouth daily.    Blood Glucose Monitoring Suppl (CONTOUR NEXT EZ) w/Device Does not apply Kit 1 kit by Does not apply route daily.    MICROLET LANCETS Does not apply Misc TEST THREE TIMES DAILY    aspirin (ASPIR-81) 81 MG Oral Tab EC Take 1 tablet by mouth daily.    [DISCONTINUED] semaglutide (OZEMPIC, 0.25 OR 0.5 MG/DOSE,) 2 MG/1.5ML Subcutaneous Solution Pen-injector Inject 0.25 mg into the skin every 7 days. (Patient not taking: Reported on 7/2/2024)     No current facility-administered medications on file prior to visit.       CMP  (most recent labs)   Lab Results   Component Value Date/Time     (H) 05/20/2024 09:28 AM    BUN 14 05/20/2024 09:28 AM    CREATSERUM 1.02 05/20/2024 09:28 AM    GFRNAA 112 07/19/2021 11:50 AM     GFRAA 129 07/19/2021 11:50 AM    EGFRCR 89 05/20/2024 09:28 AM    CA 9.4 05/20/2024 09:28 AM    ALKPHO 60 05/20/2024 09:28 AM    AST 15 05/20/2024 09:28 AM    ALT 30 05/20/2024 09:28 AM    BILT 0.7 05/20/2024 09:28 AM    TP 8.2 05/20/2024 09:28 AM    ALB 4.2 05/20/2024 09:28 AM     05/20/2024 09:28 AM    K 4.2 05/20/2024 09:28 AM     05/20/2024 09:28 AM    CO2 29.0 05/20/2024 09:28 AM           Lipids  (most recent labs)   Lab Results   Component Value Date/Time    CHOLEST 158 05/20/2024 09:28 AM    TRIG 63 05/20/2024 09:28 AM    HDL 51 05/20/2024 09:28 AM    LDL 94 05/20/2024 09:28 AM    NONHDLC 107 05/20/2024 09:28 AM          Diabetes  (most recent labs)   Lab Results   Component Value Date/Time    A1C 8.1 (H) 05/20/2024 09:28 AM          Microalb (most recent labs)   Lab Results   Component Value Date/Time    MICROALBUMIN 7.9 12/30/2014 09:48 AM    MICROALBCREA 600.0 (H) 05/20/2024 09:28 AM        Lab results reviewed with patient.    REVIEW OF SYSTEMS:   GENERAL HEALTH: feels well otherwise  SKIN: denies any unusual skin lesions or rashes  RESPIRATORY: denies shortness of breath with exertion  CARDIOVASCULAR: denies chest pain on exertion  GI: denies nausea, abdominal pain or heartburn  NEURO: c/o occasional cramping to bilateral feet at night, denies headaches   ENDO: denies polydipsia, polyuria or polyphagia, denies unexplained weight changes    EXAM:   /86   Pulse 84   Resp 20   Wt 196 lb (88.9 kg)   SpO2 98%   BMI (P) 27.34 kg/m²  Estimated body mass index is 27.34 kg/m² (pended) as calculated from the following:    Height as of 6/7/24: (P) 5' 11\" (1.803 m).    Weight as of this encounter: 196 lb (88.9 kg).   Physical Exam  Vitals reviewed.   Constitutional:       Appearance: Normal appearance.   Cardiovascular:      Pulses:           Dorsalis pedis pulses are 2+ on the right side and 2+ on the left side.   Pulmonary:      Effort: Pulmonary effort is normal.   Feet:      Right foot:       Protective Sensation: 5 sites tested.  5 sites sensed.      Skin integrity: Skin integrity normal.      Toenail Condition: Right toenails are abnormally thick.      Left foot:      Protective Sensation: 5 sites tested.  5 sites sensed.      Skin integrity: Skin integrity normal.      Toenail Condition: Left toenails are abnormally thick.      Comments: Diabetes foot exam performed: Vibration to dorsum to the first toe perceived bilaterally.  Foot care practices reviewed with patient.    Neurological:      Mental Status: He is alert and oriented to person, place, and time.   Psychiatric:         Mood and Affect: Mood normal.         Behavior: Behavior normal.         ASSESSMENT AND PLAN:   As for his Diabetes, it is needs further observation, needs improvement, patient poorly compliant, needs to follow diet more regularly.   Recommendations are:  Patient to continue Metformin 1000mg po bid and increase Semglee to 27 units injection daily.  Start Ozempic 0.25mg injection weekly x 4 weeks then if tolerating increase to 0.5mg injection weekly.    Discussed adding GLP-1 to current medication regimen.  Discussed action, risk vs benefit, dosing, and potential side effects.  Patient denies hx of pancreatitis, gastroparesis, or personal or family hx of medullary thyroid CA or MEN 2.     Instructed patient on Ozempic pen use, dosing with the pen, pen needles, site selection for injections, rotation and injection administration.  Handouts give for patient reference     Per ADA guidelines, in patients with type 2 diabetes and established ASCVD, incorporating agent proven to reduce major adverse CV events and CV mortality   GLP-1 Ozempic rx chosen since it not only lowers A1C, but studies have shown it can also reduce the risk of major CV events such as heart attack, stroke, or death in adults with type 2 diabetes who are currently treating their CV disease.  Patient requesting to use personal Freestyle Stanford CGM for  glucose monitoring.  Sample Freestyle Stanford 3 sensor provided and inserted, educated on use, mojgan and streaming set up.  Prescription sent to pharmacy, voucher provided to patient.  Unsure if patientis insurance will cover due to being only on basal insulin, patient aware of possibility of paying out of pocket and is amendable if denied.  Discussed self monitoring blood glucose and the importance of monitoring.  Patient agreed to monitoring bid - fasting and 2 hours postprandial of one meal per day if not using CGM.    Reinforced healthy eating in healthy portions and increasing daily physical activity.  Discussed meeting with dietitian for MNT, patient declines at this time.    Reviewed ways to improve the protein in the urine:   Keep blood sugars in target range   Keep blood pressure in target range   Watch over the counter medicines like NSAID (non steroidal anti-inflammatory drugs) these can be harmful to  kidneys (examples include: , Motrin , Advil, ibuprofen, naprosyn, Aleve)   Continue ARB therapy - renoprotective         As for his hypertension, Blood Pressure is stable, no significant medication side effects noted.   PLAN: will continue present medications, reviewed diet, exercise and weight control, continue ARB therapy.     As for his cholesterol, Lipids are stable, no significant medication side effects noted, needs further observation, needs improvement, needs to follow diet more regularly.   PLAN: will continue present medications, reviewed diet, exercise and weight control, continue statin therapy.      DM Health Maintenance  Last dilated eye exam: Last Dilated Eye Exam: 24   Exam shows retinopathy? Eye Exam shows Diabetic Retinopathy?: Yes    Last diabetic foot exam: Last Foot Exam: 24    Date of last PHQ-2 depression screen: PHQ-2 - Date of last depression screenin2024    Patient  reports that he has never smoked. He has never been exposed to tobacco smoke. He has never used  smokeless tobacco.  When is flu vaccine due? No recommendations at this time  When is pneumonia vaccine due? No recommendations at this time    The patient indicates understanding of these issues and agrees to the plan.  Refills addressed at time of office visit.    Diagnoses and all orders for this visit:    Type 2 diabetes mellitus with hyperglycemia, with long-term current use of insulin (HCC)  -     semaglutide (OZEMPIC, 0.25 OR 0.5 MG/DOSE,) 2 MG/3ML Subcutaneous Solution Pen-injector; Inject 0.25 mg into the skin once a week. X 4 weeks then if tolerating increase to 0.5mg injection once weekly  -     Sociagram.comSTYLE SUNNY 3 SENSOR Does not apply Misc; 1 each every 14 (fourteen) days.    Primary hypertension    Mixed hyperlipidemia    Macroalbuminuric diabetic nephropathy (HCC)       Return in about 5 weeks (around 8/9/2024) for 45 minute appointment, Personal CGM, Virtual Visit.    The risks and benefits of my recommendations, as well as other treatment options were discussed with the patient today. Questions were answered to the best of my knowledge.   45 min spent with patient and >50% time spent counseling and coordinating care related to their office visit.      Laly PERAZA, BC-ADM, CDCES

## 2024-07-16 ENCOUNTER — NURSE ONLY (OUTPATIENT)
Dept: ENDOCRINOLOGY CLINIC | Facility: CLINIC | Age: 49
End: 2024-07-16
Payer: COMMERCIAL

## 2024-07-16 ENCOUNTER — TELEPHONE (OUTPATIENT)
Dept: ENDOCRINOLOGY CLINIC | Facility: CLINIC | Age: 49
End: 2024-07-16

## 2024-07-16 DIAGNOSIS — E11.65 UNCONTROLLED TYPE 2 DIABETES MELLITUS WITH HYPERGLYCEMIA (HCC): Primary | ICD-10-CM

## 2024-07-16 NOTE — TELEPHONE ENCOUNTER
Patient called office- he had been given a Stanford 3 sensor in office- he has looked all over and cannot find the next one needed- the current one he is wearing is expiring today- requested a sample.     Messaged with Laly, confirmed patient can have another sample- samples in [place] or [place] office- advised patient and he would prefer [place] office around 1 pm- placed on nurse schedule and advised MA.     Future Appointments   Date Time Provider Department Center   7/16/2024  1:15 PM [facility] NURSE [facility]   8/9/2024 11:15 AM Laly Alcantara APRN [facility]

## 2024-08-05 ENCOUNTER — TELEPHONE (OUTPATIENT)
Dept: ENDOCRINOLOGY CLINIC | Facility: CLINIC | Age: 49
End: 2024-08-05

## 2024-08-09 ENCOUNTER — TELEMEDICINE (OUTPATIENT)
Dept: ENDOCRINOLOGY CLINIC | Facility: CLINIC | Age: 49
End: 2024-08-09
Payer: COMMERCIAL

## 2024-08-09 DIAGNOSIS — E11.65 TYPE 2 DIABETES MELLITUS WITH HYPERGLYCEMIA, WITH LONG-TERM CURRENT USE OF INSULIN (HCC): Primary | ICD-10-CM

## 2024-08-09 DIAGNOSIS — I10 PRIMARY HYPERTENSION: ICD-10-CM

## 2024-08-09 DIAGNOSIS — E11.21 MACROALBUMINURIC DIABETIC NEPHROPATHY (HCC): ICD-10-CM

## 2024-08-09 DIAGNOSIS — Z79.4 TYPE 2 DIABETES MELLITUS WITH HYPERGLYCEMIA, WITH LONG-TERM CURRENT USE OF INSULIN (HCC): Primary | ICD-10-CM

## 2024-08-09 DIAGNOSIS — E78.2 MIXED HYPERLIPIDEMIA: ICD-10-CM

## 2024-08-09 PROCEDURE — 99214 OFFICE O/P EST MOD 30 MIN: CPT | Performed by: NURSE PRACTITIONER

## 2024-08-09 RX ORDER — SEMAGLUTIDE 0.68 MG/ML
0.5 INJECTION, SOLUTION SUBCUTANEOUS WEEKLY
Qty: 3 ML | Refills: 0 | Status: SHIPPED | OUTPATIENT
Start: 2024-08-09

## 2024-08-13 ENCOUNTER — TELEPHONE (OUTPATIENT)
Dept: ENDOCRINOLOGY CLINIC | Facility: CLINIC | Age: 49
End: 2024-08-13

## 2024-08-13 NOTE — TELEPHONE ENCOUNTER
Pt called in about his ozempic. He usually doses Tuesday, missed last Tuesday and  informed him he can be up to 5 days late on his dose and still return to regularly scheduled day. He told dose last Friday and wanted to make sure it was okay to go back to taking on Tuesdays this week. Confirmed with  okay to dose today

## 2024-09-03 ENCOUNTER — TELEPHONE (OUTPATIENT)
Dept: ENDOCRINOLOGY CLINIC | Facility: CLINIC | Age: 49
End: 2024-09-03

## 2024-09-06 DIAGNOSIS — I10 PRIMARY HYPERTENSION: ICD-10-CM

## 2024-09-06 RX ORDER — LOSARTAN POTASSIUM 100 MG/1
100 TABLET ORAL DAILY
Qty: 90 TABLET | Refills: 0 | Status: SHIPPED | OUTPATIENT
Start: 2024-09-06

## 2024-09-06 NOTE — TELEPHONE ENCOUNTER
Your Appointments      Friday September 20, 2024 10:45 AM  Video Visit with KARMEN Renteria  [facility], [facility] [address], [place] ([facility] [place]) [address]  [facility] [address] [phone]   Please verify your telehealth insurance benefits prior to your appointment.    You must be in the [facility] during the virtual visit.     Please use the [employer]rain and launch the video visit 10 minutes prior to your scheduled appointment time to ensure your camera and microphone are working properly. Once the video visit has started you will be placed in a waiting room until the provider begins the visit.     You will receive an email confirmation with instructions.  If you have questions, call your doctor's office directly.    If you are having issues or need to use a desktop/laptop, please follow the below steps:        1.       Close out all other open apps (could be competing for audio resources)  2.       Disable Bluetooth  3.       Reboot mobile device before joining the video  4.       Come off Wi-Fi and switch over to Data    Please see our Video Visit Tip Sheet if you need additional assistance.     If you believe this is an emergency, please dial 911 immediately.

## 2024-09-10 DIAGNOSIS — Z79.4 TYPE 2 DIABETES MELLITUS WITH HYPERGLYCEMIA, WITH LONG-TERM CURRENT USE OF INSULIN (HCC): ICD-10-CM

## 2024-09-10 DIAGNOSIS — E11.65 TYPE 2 DIABETES MELLITUS WITH HYPERGLYCEMIA, WITH LONG-TERM CURRENT USE OF INSULIN (HCC): ICD-10-CM

## 2024-09-11 NOTE — TELEPHONE ENCOUNTER
Call to patient- advised most likely latest it will be sent is tomorrow as Laly is out of office on Wednesday's. No further question/concerns at this time.

## 2024-09-11 NOTE — TELEPHONE ENCOUNTER
Patient left message on nurse line- wanted to confirm that we had received re-fill request that was sent yesterday- see that it was forwarded to provider today- will call patient back later to advise request was sent to Laly- signing in later on due to being off on Wednesday

## 2024-09-11 NOTE — TELEPHONE ENCOUNTER
Requested Prescriptions     Pending Prescriptions Disp Refills    OZEMPIC, 0.25 OR 0.5 MG/DOSE, 2 MG/3ML Subcutaneous Solution Pen-injector [Pharmacy Med Name: OZEMPIC 0.25 OR 0.5MG DOS(2MG/3ML)] 3 mL 0     Sig: INJECT 0.5 MG UNDER THE SKIN ONCE A WEEK     Future Appointments   Date Time Provider Department Center   9/20/2024 10:45 AM Laly Alcantara APRN [facility]     Last A1c value was 8.1% done 5/20/2024.  Refill 8/9/24 D.Maricruz   LOV 8/9/24 D.Maricruz

## 2024-09-12 RX ORDER — SEMAGLUTIDE 0.68 MG/ML
0.5 INJECTION, SOLUTION SUBCUTANEOUS WEEKLY
Qty: 3 ML | Refills: 0 | Status: SHIPPED | OUTPATIENT
Start: 2024-09-12

## 2024-09-16 ENCOUNTER — TELEPHONE (OUTPATIENT)
Dept: ENDOCRINOLOGY CLINIC | Facility: CLINIC | Age: 49
End: 2024-09-16

## 2024-09-20 ENCOUNTER — TELEPHONE (OUTPATIENT)
Dept: ENDOCRINOLOGY CLINIC | Facility: CLINIC | Age: 49
End: 2024-09-20

## 2024-10-09 DIAGNOSIS — Z79.4 TYPE 2 DIABETES MELLITUS WITH HYPERGLYCEMIA, WITH LONG-TERM CURRENT USE OF INSULIN (HCC): ICD-10-CM

## 2024-10-09 DIAGNOSIS — E11.65 TYPE 2 DIABETES MELLITUS WITH HYPERGLYCEMIA, WITH LONG-TERM CURRENT USE OF INSULIN (HCC): ICD-10-CM

## 2024-10-09 NOTE — TELEPHONE ENCOUNTER
Requested Prescriptions     Pending Prescriptions Disp Refills    OZEMPIC, 0.25 OR 0.5 MG/DOSE, 2 MG/3ML Subcutaneous Solution Pen-injector [Pharmacy Med Name: OZEMPIC 0.25 OR 0.5MG DOS(2MG/3ML)] 3 mL 0     Sig: INJECT 0.5 MG UNDER THE SKIN ONCE A WEEK     Future Appointments   Date Time Provider Department Center   10/14/2024 11:00 AM Lloyd Guaman MD [facility] [place]   Last A1c value was 8.1% done 5/20/2024.  Refill 9/12/24 [place]  LOV 8/9/24 [place]

## 2024-10-10 ENCOUNTER — TELEPHONE (OUTPATIENT)
Dept: ENDOCRINOLOGY CLINIC | Facility: CLINIC | Age: 49
End: 2024-10-10

## 2024-10-10 RX ORDER — SEMAGLUTIDE 0.68 MG/ML
0.5 INJECTION, SOLUTION SUBCUTANEOUS WEEKLY
Qty: 3 ML | Refills: 0 | Status: SHIPPED | OUTPATIENT
Start: 2024-10-10

## 2024-10-10 NOTE — TELEPHONE ENCOUNTER
Your Appointments      Friday October 11, 2024 10:15 AM  Laboratory Services with [facility], [facility] [address] ([facility] [place]) [address]  [facility] [address]  [phone]        Monday October 14, 2024 11:00 AM  Exam - Established with Lloyd Guaman MD  [address] ([facility]) [address]  [facility] [id number] [phone]        Monday December 23, 2024 2:15 PM  Diabetes Pump follow up with KARMEN Renteria  [facility] [address] ([facility] [place]) [address]  [facility] [id number] [phone]               Per last note patient is NOT currently on Tresiba.     ASSESSMENT AND PLAN:   Diabetes:  Patient to continue Metformin 1000mg po bid and Ozempic 0.5mg injection weekly.  Remain off of Semglee at this time.    Call placed to patient to confirm. Patient states he needs Ozempic not Tresiba, does not take Tresiba.

## 2024-10-11 ENCOUNTER — LAB ENCOUNTER (OUTPATIENT)
Dept: LAB | Age: 49
End: 2024-10-11
Attending: FAMILY MEDICINE
Payer: COMMERCIAL

## 2024-10-11 DIAGNOSIS — E11.65 UNCONTROLLED TYPE 2 DIABETES MELLITUS WITH HYPERGLYCEMIA (HCC): ICD-10-CM

## 2024-10-11 LAB
EST. AVERAGE GLUCOSE BLD GHB EST-MCNC: 180 MG/DL (ref 68–126)
HBA1C MFR BLD: 7.9 % (ref ?–5.7)

## 2024-10-11 PROCEDURE — 83036 HEMOGLOBIN GLYCOSYLATED A1C: CPT

## 2024-10-11 PROCEDURE — 36415 COLL VENOUS BLD VENIPUNCTURE: CPT

## 2024-10-18 ENCOUNTER — OFFICE VISIT (OUTPATIENT)
Dept: FAMILY MEDICINE CLINIC | Facility: CLINIC | Age: 49
End: 2024-10-18
Payer: COMMERCIAL

## 2024-10-18 VITALS
RESPIRATION RATE: 14 BRPM | OXYGEN SATURATION: 99 % | DIASTOLIC BLOOD PRESSURE: 88 MMHG | HEART RATE: 81 BPM | BODY MASS INDEX: 26.32 KG/M2 | WEIGHT: 188 LBS | HEIGHT: 71 IN | SYSTOLIC BLOOD PRESSURE: 133 MMHG

## 2024-10-18 DIAGNOSIS — I10 ESSENTIAL HYPERTENSION: ICD-10-CM

## 2024-10-18 DIAGNOSIS — E78.00 PURE HYPERCHOLESTEROLEMIA WITH TARGET LOW DENSITY LIPOPROTEIN (LDL) CHOLESTEROL LESS THAN 130 MG/DL: Primary | ICD-10-CM

## 2024-10-18 DIAGNOSIS — E11.9 DIABETES MELLITUS WITHOUT COMPLICATION (HCC): ICD-10-CM

## 2024-10-18 PROCEDURE — 3051F HG A1C>EQUAL 7.0%<8.0%: CPT | Performed by: FAMILY MEDICINE

## 2024-10-18 PROCEDURE — 3008F BODY MASS INDEX DOCD: CPT | Performed by: FAMILY MEDICINE

## 2024-10-18 PROCEDURE — 3079F DIAST BP 80-89 MM HG: CPT | Performed by: FAMILY MEDICINE

## 2024-10-18 PROCEDURE — 3075F SYST BP GE 130 - 139MM HG: CPT | Performed by: FAMILY MEDICINE

## 2024-10-18 PROCEDURE — 99214 OFFICE O/P EST MOD 30 MIN: CPT | Performed by: FAMILY MEDICINE

## 2024-10-18 RX ORDER — AMLODIPINE BESYLATE 5 MG/1
5 TABLET ORAL 2 TIMES DAILY
COMMUNITY
Start: 2024-09-19 | End: 2024-10-18

## 2024-10-18 RX ORDER — FLURBIPROFEN SODIUM 0.3 MG/ML
SOLUTION/ DROPS OPHTHALMIC
COMMUNITY
Start: 2024-08-09

## 2024-10-18 RX ORDER — INSULIN GLARGINE-YFGN 100 [IU]/ML
25 INJECTION, SOLUTION SUBCUTANEOUS NIGHTLY
COMMUNITY
Start: 2024-09-30

## 2024-10-18 RX ORDER — AMLODIPINE BESYLATE 5 MG/1
5 TABLET ORAL 2 TIMES DAILY
Qty: 60 TABLET | Refills: 5 | Status: SHIPPED | OUTPATIENT
Start: 2024-10-18

## 2024-10-18 NOTE — PROGRESS NOTES
Subjective:   Farzad Romo is a 49 year old male who presents for Follow - Up (Diabetic f/u- concerns over blood pressure fluctuations. )     Patient presents for a diabetes follow up. He reports no concerns with his diabetic management and reports he has been working with the Diabetic [facility] to help him with his diet. He states that he takes Metformin when he feels like his blood sugar is higher than it should be. He currently is taking Ozempic once a week. He denies any new symptoms such as fatigue, polyuria, polydipsia, excessive thirst.    The patient has a history of hypertension and expresses concerns about his blood pressure being elevated. He expresses there is a family history of hypertension. He denies blurred vision, chest pain, dizziness or headaches.     Lab Results   Component Value Date    A1C 7.9 (H) 10/11/2024    A1C 8.1 (H) 05/20/2024    A1C 11.7 (H) 01/25/2024    A1C 10.0 (H) 09/23/2022    A1C 8.8 (H) 07/19/2021      HISTORY:  Past Medical History:    Essential hypertension    Type II or unspecified type diabetes mellitus without mention of complication, not stated as uncontrolled      Past Surgical History:   Procedure Laterality Date    Appendectomy  10 yrs ago      Family History   Problem Relation Age of Onset    Hypertension Father     Diabetes Father     Hypertension Mother     Diabetes Mother     Diabetes Sister     Diabetes Sister     Diabetes Brother     Dementia Maternal Aunt     Dementia Maternal Aunt     Dementia Maternal Aunt       Social History     Socioeconomic History    Marital status: Single   Tobacco Use    Smoking status: Never     Passive exposure: Never    Smokeless tobacco: Never   Vaping Use    Vaping status: Never Used   Substance and Sexual Activity    Alcohol use: Not Currently     Comment: rarely    Drug use: No   Other Topics Concern    Caffeine Concern No     Comment: very little    Exercise Yes     Comment: 4x per week    Seat Belt Yes    Special Diet No     Stress Concern Yes    Weight Concern No          Current Outpatient Medications   Medication Sig Dispense Refill    SEMGLEE, YFGN, 100 UNIT/ML Subcutaneous Solution Pen-injector Inject 25 Units into the skin nightly.      BD PEN NEEDLE MINI U/F 31G X 5 MM Does not apply Misc USE WITH SEMGLEE DAILY AS DIRECTED      amLODIPine 5 MG Oral Tab Take 1 tablet (5 mg total) by mouth 2 (two) times daily. Ok to take 1-2 tablets if BP average is over 150/90 60 tablet 5    OZEMPIC, 0.25 OR 0.5 MG/DOSE, 2 MG/3ML Subcutaneous Solution Pen-injector INJECT 0.5 MG UNDER THE SKIN ONCE A WEEK 3 mL 0    LOSARTAN 100 MG Oral Tab TAKE 1 TABLET(100 MG) BY MOUTH DAILY 90 tablet 0    FREESTYLE SUNNY 3 SENSOR Does not apply Misc 1 each every 14 (fourteen) days. 2 each 11    METFORMIN HCL 1000 MG Oral Tab TAKE 1 TABLET(1000 MG) BY MOUTH TWICE DAILY 180 tablet 1    atorvastatin 20 MG Oral Tab Take 1 tablet (20 mg total) by mouth nightly. 30 tablet 5    Glucose Blood (CONTOUR NEXT TEST) In Vitro Strip 1 each by Other route 3 (three) times daily. 100 strip 5    montelukast 10 MG Oral Tab Take 1 tablet (10 mg total) by mouth nightly. 30 tablet 5    Blood Pressure Monitor Does not apply Device Use to monitor blood pressure at home 1 each 0    Blood Pressure Monitoring Does not apply Device Monitor blood pressure daily 1 each 0    Blood Pressure Monitoring (BLOOD PRESSURE KIT) Does not apply Device 1 each daily. 1 each 2    cyanocobalamin ([employer]S VITAMIN B12) 1000 MCG Oral Tab Take 1 tablet (1,000 mcg total) by mouth daily. 30 tablet 3    Blood Glucose Monitoring Suppl (CONTOUR NEXT EZ) w/Device Does not apply Kit 1 kit by Does not apply route daily. 1 kit 0    MICROLET LANCETS Does not apply Misc TEST THREE TIMES DAILY 300 each 0    aspirin (ASPIR-81) 81 MG Oral Tab EC Take 1 tablet by mouth daily. 1 tablet 0     Review of Systems:  Review of Systems   Constitutional: Negative.    Respiratory: Negative.     Cardiovascular: Negative.     Gastrointestinal: Negative.    Skin: Negative.    Neurological: Negative.        Objective:   /80   Pulse 81   Resp 14   Ht 5' 11\" (1.803 m)   Wt 188 lb   SpO2 99%   BMI 26.22 kg/m²  Estimated body mass index is 26.22 kg/m² as calculated from the following:    Height as of this encounter: 5' 11\" (1.803 m).    Weight as of this encounter: 188 lb.    Physical Exam  Constitutional:       Appearance: He is well-developed.   Cardiovascular:      Rate and Rhythm: Normal rate and regular rhythm.      Heart sounds: Normal heart sounds.   Pulmonary:      Effort: Pulmonary effort is normal.      Breath sounds: Normal breath sounds.   Abdominal:      General: Bowel sounds are normal.      Palpations: Abdomen is soft.   Skin:     General: Skin is warm and dry.   Neurological:      Mental Status: He is alert and oriented to person, place, and time.      Deep Tendon Reflexes: Reflexes are normal and symmetric.       Assessment & Plan:   1. Pure hypercholesterolemia with target low density lipoprotein (LDL) cholesterol less than 130 mg/dL (Primary)  Advised patient to continue watching his cholesterol intake, as this can also decrease his blood pressure.   - CBC With Differential With Platelet; Future  - Comp Metabolic Panel (14); Future  - Lipid Panel; Future    2. Diabetes mellitus without complication (HCC)  - CBC With Differential With Platelet; Future  - Comp Metabolic Panel (14); Future  - SEMGLEE, YFGN, 100 UNIT/ML Subcutaneous Solution Pen-injector; Inject 25 Units into the skin nightly.  - BD PEN NEEDLE MINI U/F 31G X 5 MM Does not apply Misc; USE WITH SEMGLEE DAILY AS DIRECTED  - Lipase [E]; Future    Stable, continue current management  Diabetes Education:  Diabetes disease process, Nutritional Management, Physical Activity, Using Medications, Monitoring, Preventing Acute Complications, Preventing Chronic Complications, Behavior Change Strategies, Risk Reduction Strategies   Reducing Risk: Daily foot  checks, BP Control, Lipid Control, Dilated eye exam, Skin/dental care, Urine for microalbumin, Weight Control  Medication: Take at appropriate times, Take prescribed dose  Healthy Eating: Evenly spaced carb balanced meals, No skipped meals, Increase fiber, fruits and veggies    3. Essential hypertension  -     CBC With Differential With Platelet; Future; Expected date: 10/18/2024  -     Comp Metabolic Panel (14); Future; Expected date: 10/18/2024  -     Lipid Panel; Future; Expected date: 10/18/2024  -     amLODIPine Besylate; Take 1 tablet (5 mg total) by mouth 2 (two) times daily. Ok to take 1-2 tablets if BP average is over 150/90  Dispense: 60 tablet; Refill: 5    Follow up in 3 months or earlier PRN.     Vandana LECHUGA

## 2024-10-21 NOTE — PROGRESS NOTES
Chief Complaint   Patient presents with    Follow - Up     Diabetic f/u- concerns over blood pressure fluctuations.        Farzad Romo is a 49 year old year old who presents for recheck of diabetes.   Patient's blood pressure is under control.   Patient's eye exam: UTD  Last A1C was getting close to goal.   Compliance to diet: improved  Compliance to exercise: no  Compliance to taking all medication: yes  Patient has no history of diabetic foot ulcer.     Pt complains of nothing new, sugars are doing better. .    Last A1c value was 7.9% done 10/11/2024.     Patient denies shortness of breath, denies chest pain and denies any recent fevers or chills.    Patient reports no urinary complaints and denies headaches or visual disturbances.   Patient denies any abdominal pain at this time. Patient has no new skin lesions.  Patient reports no acute back pain and reports no dizziness or headaches.   Patient reports no visual disturbances and reports hearing has been about the same.   Patient reports no recent injury or trauma.          Latest Ref Rng & Units 10/20/2024    10:12 PM 10/18/2024    10:35 AM 10/18/2024    10:17 AM 10/18/2024    10:08 AM 10/11/2024    10:33 AM 9/6/2024    12:00 AM 7/2/2024     1:14 PM   DIABETIC FLOWSHEET (EE)   BMI     26.22 kg/m2      Hgb A1c <5.7 %     7.9      HEMOGLOBIN A1c <5.7 %     7.9      Losartan Potassium  100 mg Daily OR    100 mg Daily OR     100 mg Daily OR     100 mg Daily OR     100 mg Daily OR     100 mg Daily OR     100 mg Daily OR       Weight (enc vitals)     188 lb      BP (enc vitals)   133/88 134/78 168/82      Last Foot Exam        7/2/2024   PHQ2 Score           PHQ2 Score (Depression/Suicide Screening)                 7/2/2024     1:09 PM   DIABETIC FLOWSHEET ([facility])   BMI    Hgb A1c    HEMOGLOBIN A1c    Losartan Potassium 100 mg Daily OR       Weight (enc vitals)    BP (enc vitals)    Last Foot Exam    PHQ2 Score 0   PHQ2 Score (Depression/Suicide Screening) 0        Medication marked as long-term       Current medications:   Current Outpatient Medications:     SEMGLEE, YFGN, 100 UNIT/ML Subcutaneous Solution Pen-injector, Inject 25 Units into the skin nightly., Disp: , Rfl:     BD PEN NEEDLE MINI U/F 31G X 5 MM Does not apply Misc, USE WITH SEMGLEE DAILY AS DIRECTED, Disp: , Rfl:     amLODIPine 5 MG Oral Tab, Take 1 tablet (5 mg total) by mouth 2 (two) times daily. Ok to take 1-2 tablets if BP average is over 150/90, Disp: 60 tablet, Rfl: 5    OZEMPIC, 0.25 OR 0.5 MG/DOSE, 2 MG/3ML Subcutaneous Solution Pen-injector, INJECT 0.5 MG UNDER THE SKIN ONCE A WEEK, Disp: 3 mL, Rfl: 0    LOSARTAN 100 MG Oral Tab, TAKE 1 TABLET(100 MG) BY MOUTH DAILY, Disp: 90 tablet, Rfl: 0    FREESTYLE SUNNY 3 SENSOR Does not apply Misc, 1 each every 14 (fourteen) days., Disp: 2 each, Rfl: 11    METFORMIN HCL 1000 MG Oral Tab, TAKE 1 TABLET(1000 MG) BY MOUTH TWICE DAILY, Disp: 180 tablet, Rfl: 1    atorvastatin 20 MG Oral Tab, Take 1 tablet (20 mg total) by mouth nightly., Disp: 30 tablet, Rfl: 5    Glucose Blood (CONTOUR NEXT TEST) In Vitro Strip, 1 each by Other route 3 (three) times daily., Disp: 100 strip, Rfl: 5    montelukast 10 MG Oral Tab, Take 1 tablet (10 mg total) by mouth nightly., Disp: 30 tablet, Rfl: 5    Blood Pressure Monitor Does not apply Device, Use to monitor blood pressure at home, Disp: 1 each, Rfl: 0    Blood Pressure Monitoring Does not apply Device, Monitor blood pressure daily, Disp: 1 each, Rfl: 0    Blood Pressure Monitoring (BLOOD PRESSURE KIT) Does not apply Device, 1 each daily., Disp: 1 each, Rfl: 2    cyanocobalamin ([employer] VITAMIN B12) 1000 MCG Oral Tab, Take 1 tablet (1,000 mcg total) by mouth daily., Disp: 30 tablet, Rfl: 3    Blood Glucose Monitoring Suppl (CONTOUR NEXT EZ) w/Device Does not apply Kit, 1 kit by Does not apply route daily., Disp: 1 kit, Rfl: 0    MICROLET LANCETS Does not apply Misc, TEST THREE TIMES DAILY, Disp: 300 each, Rfl: 0    aspirin  (ASPIR-81) 81 MG Oral Tab EC, Take 1 tablet by mouth daily., Disp: 1 tablet, Rfl: 0     Last documented BP: 133/88    HEMOGLOBIN A1C (%)   Date Value   01/10/2019 10.6 (A)   12/12/2016 8.5 (A)   09/26/2016 8.0 (A)     HgbA1C (%)   Date Value   10/11/2024 7.9 (H)   05/20/2024 8.1 (H)   01/25/2024 11.7 (H)     LDL Cholesterol (mg/dL)   Date Value   05/20/2024 94   01/25/2024 106 (H)   09/23/2022 106 (H)     LDL-CHOLESTEROL (mg/dL (calc))   Date Value   12/30/2014 223 (H)     AST (U/L)   Date Value   05/20/2024 15   05/18/2024 19   05/15/2024 23   12/30/2014 18     ALT (U/L)   Date Value   05/20/2024 30   05/18/2024 27   05/15/2024 32   12/30/2014 23        reports that he has never smoked. He has never been exposed to tobacco smoke. He has never used smokeless tobacco.    Counseling given: Not Answered      Immunization History   Administered Date(s) Administered    Covid-19 Vaccine Moderna 100 mcg/0.5 ml 04/08/2021, 05/24/2021    Covid-19 Vaccine Moderna 50 Mcg/0.25 Ml 11/26/2021    Covid-19 Vaccine Moderna Bivalent 50mcg/0.5mL 12+ years 11/27/2022    FLULAVAL 6 months & older 0.5 ml Prefilled syringe (84544) 11/28/2017, 10/19/2020, 11/18/2021    FLUZONE 6 months and older PFS 0.5 ml (98591) 10/20/2015, 10/31/2016, 11/28/2017, 10/19/2020, 11/18/2021    Influenza 10/30/2014    Pneumococcal Conjugate PCV20 07/15/2022    TDAP 12/30/2009, 06/27/2023    Zoster Vaccine Recombinant Adjuvanted (Shingrix) 06/27/2023, 12/14/2023       Review of Systems   Constitutional: Negative for fever, chills and fatigue. No distress.  HENT: Negative for hearing loss, congestion, sore throat, neck pain and dental problem.    Eyes: Negative for pain and visual disturbance.   Respiratory: Negative for cough, chest tightness, shortness of breath and wheezing.    Cardiovascular: Negative for chest pain, palpitations and leg swelling.   Gastrointestinal: Negative for nausea, vomiting, abdominal pain, diarrhea, blood in stool and abdominal  distention.   Genitourinary: Negative for dysuria, hematuria and difficulty urinating.   Musculoskeletal: Negative for myalgias, back pain, joint swelling, arthralgias and gait problem.   Skin: Negative for color change and rash.   Neurological: Negative for dizziness, syncope, weakness, numbness, tingling and headaches.   Hematological: Negative for adenopathy. Does not bruise/bleed easily.   Psychiatric/Behavioral: The patient is not nervous/anxious. No depression.    Patient Active Problem List   Diagnosis    Pure hypercholesterolemia with target low density lipoprotein (LDL) cholesterol less than 130 mg/dL    Seasonal allergies    Uncontrolled type 2 diabetes mellitus with hyperglycemia (HCC)    Vitamin D deficiency    Polyp of colon       Current Outpatient Medications   Medication Sig Dispense Refill    SEMGLEE, YFGN, 100 UNIT/ML Subcutaneous Solution Pen-injector Inject 25 Units into the skin nightly.      BD PEN NEEDLE MINI U/F 31G X 5 MM Does not apply Misc USE WITH SEMGLEE DAILY AS DIRECTED      amLODIPine 5 MG Oral Tab Take 1 tablet (5 mg total) by mouth 2 (two) times daily. Ok to take 1-2 tablets if BP average is over 150/90 60 tablet 5    OZEMPIC, 0.25 OR 0.5 MG/DOSE, 2 MG/3ML Subcutaneous Solution Pen-injector INJECT 0.5 MG UNDER THE SKIN ONCE A WEEK 3 mL 0    LOSARTAN 100 MG Oral Tab TAKE 1 TABLET(100 MG) BY MOUTH DAILY 90 tablet 0    FREESTYLE SUNNY 3 SENSOR Does not apply Misc 1 each every 14 (fourteen) days. 2 each 11    METFORMIN HCL 1000 MG Oral Tab TAKE 1 TABLET(1000 MG) BY MOUTH TWICE DAILY 180 tablet 1    atorvastatin 20 MG Oral Tab Take 1 tablet (20 mg total) by mouth nightly. 30 tablet 5    Glucose Blood (CONTOUR NEXT TEST) In Vitro Strip 1 each by Other route 3 (three) times daily. 100 strip 5    montelukast 10 MG Oral Tab Take 1 tablet (10 mg total) by mouth nightly. 30 tablet 5    Blood Pressure Monitor Does not apply Device Use to monitor blood pressure at home 1 each 0    Blood Pressure  Monitoring Does not apply Device Monitor blood pressure daily 1 each 0    Blood Pressure Monitoring (BLOOD PRESSURE KIT) Does not apply Device 1 each daily. 1 each 2    cyanocobalamin ([employer] VITAMIN B12) 1000 MCG Oral Tab Take 1 tablet (1,000 mcg total) by mouth daily. 30 tablet 3    Blood Glucose Monitoring Suppl (CONTOUR NEXT EZ) w/Device Does not apply Kit 1 kit by Does not apply route daily. 1 kit 0    MICROLET LANCETS Does not apply Misc TEST THREE TIMES DAILY 300 each 0    aspirin (ASPIR-81) 81 MG Oral Tab EC Take 1 tablet by mouth daily. 1 tablet 0       Past Medical History:    Essential hypertension    Type II or unspecified type diabetes mellitus without mention of complication, not stated as uncontrolled     Past Surgical History:   Procedure Laterality Date    Appendectomy  10 yrs ago     Family History   Problem Relation Age of Onset    Hypertension Father     Diabetes Father     Hypertension Mother     Diabetes Mother     Diabetes Sister     Diabetes Sister     Diabetes Brother     Dementia Maternal Aunt     Dementia Maternal Aunt     Dementia Maternal Aunt      Social History     Socioeconomic History    Marital status: Single   Tobacco Use    Smoking status: Never     Passive exposure: Never    Smokeless tobacco: Never   Vaping Use    Vaping status: Never Used   Substance and Sexual Activity    Alcohol use: Not Currently     Comment: rarely    Drug use: No   Other Topics Concern    Caffeine Concern No     Comment: very little    Exercise Yes     Comment: 4x per week    Seat Belt Yes    Special Diet No    Stress Concern Yes    Weight Concern No       Physical Exam  /88   Pulse 81   Resp 14   Ht 5' 11\" (1.803 m)   Wt 188 lb (85.3 kg)   SpO2 99%   BMI 26.22 kg/m²   Constitutional: Alert and Oriented x 3.   HEENT:  Normocephalic and atraumatic. Hearing is normal. Nose normal. Oropharynx is clear and moist.   Eyes: Conjunctivae is normal.   Neck: Normal range of motion. Neck supple. Normal  carotid pulses and no JVD present. No edema present. No mass and no thyromegaly present.   Cardiovascular: Normal rate, regular rhythm and intact distal pulses.  No murmur, rubs or gallops.   Pulmonary/Chest: no respiratory distress. Lungs are clear.   Abdominal: Soft. Bowel sounds are normal. Non tender  Musculoskeletal: Normal range of motion.   Neurological: Normal reflexes. No cranial nerve deficit or sensory deficit. normal muscle tone. Coordination normal.   Ext: peripheral pulses normal, no pedal edema, no clubbing or cyanosis, feet normal, good pulses, normal color, temperature and sensation    Bilateral barefoot skin diabetic exam is normal, visualized feet and the appearance is normal.  Bilateral monofilament/sensation of both feet is normal.  Pulsation pedal pulse exam of both lower legs/feet is normal as well.   Skin: Skin is warm and dry  Psychiatric: Normal mood and affect.       A/P:    1. Pure hypercholesterolemia with target low density lipoprotein (LDL) cholesterol less than 130 mg/dL  -stable, sugars control  - CBC With Differential With Platelet; Future  - Comp Metabolic Panel (14); Future  - Lipid Panel; Future    2. Diabetes mellitus without complication (HCC)  -improved, labs in 3 months, will see diabetic clinic in 12/24  - CBC With Differential With Platelet; Future  - Comp Metabolic Panel (14); Future  - SEMGLEE, YFGN, 100 UNIT/ML Subcutaneous Solution Pen-injector; Inject 25 Units into the skin nightly.  - BD PEN NEEDLE MINI U/F 31G X 5 MM Does not apply Misc; USE WITH SEMGLEE DAILY AS DIRECTED  - Lipase [E]; Future    3. Essential hypertension  -stable, will take if BP is running over 140/90 on average.   - amLODIPine 5 MG Oral Tab; Take 1 tablet (5 mg total) by mouth 2 (two) times daily. Ok to take 1-2 tablets if BP average is over 150/90  Dispense: 60 tablet; Refill: 5       Diabetes Education:  Diabetes disease process, Nutritional Management, Physical Activity, Using Medications,  Monitoring, Preventing Acute Complications, Preventing Chronic Complications, Behavior Change Strategies, Risk Reduction Strategies   Reducing Risk: Daily foot checks, BP Control, Lipid Control, Dilated eye exam, Skin/dental care, Urine for microalbumin, Weight Control  Medication: Take at appropriate times, Take prescribed dose  Healthy Eating: Evenly spaced carb balanced meals, No skipped meals, Increase fiber, fruits and veggies     Pt verbalized understanding and has no further questions at this time.  Over 30 minutes was spent with patient reviewing medication, reviewing labs, and medical plan.    Greater than 50% of visit spent on education and counseling.  Office Follow up visit: 3-6 months, sooner prn.

## 2024-11-06 DIAGNOSIS — E11.65 TYPE 2 DIABETES MELLITUS WITH HYPERGLYCEMIA, WITH LONG-TERM CURRENT USE OF INSULIN (HCC): ICD-10-CM

## 2024-11-06 DIAGNOSIS — Z79.4 TYPE 2 DIABETES MELLITUS WITH HYPERGLYCEMIA, WITH LONG-TERM CURRENT USE OF INSULIN (HCC): ICD-10-CM

## 2024-11-06 NOTE — TELEPHONE ENCOUNTER
Requested Prescriptions     Pending Prescriptions Disp Refills    OZEMPIC, 0.25 OR 0.5 MG/DOSE, 2 MG/3ML Subcutaneous Solution Pen-injector [Pharmacy Med Name: OZEMPIC 0.25 OR 0.5MG DOS(2MG/3ML)] 3 mL 0     Sig: INJECT 0.5 MG UNDER THE SKIN ONCE A WEEK     Future Appointments   Date Time Provider Department Center   12/23/2024  2:15 PM Laly Alcantara APRN [id number]     Your appointments       Date & Time Appointment Department ([place])    Dec 23, 2024 2:15 PM CST Diabetes Pump follow up with Laly Alcantara APRN [address] ([facility] [place])              [facility] [address]  [facility] [place]  [address] [id number] [phone]          Last A1c value was 7.9% done 10/11/2024.  Last OV:08/09/2024-  Last refill: 10/10/2024-

## 2024-11-07 ENCOUNTER — APPOINTMENT (OUTPATIENT)
Dept: GENERAL RADIOLOGY | Age: 49
End: 2024-11-07
Attending: EMERGENCY MEDICINE
Payer: COMMERCIAL

## 2024-11-07 ENCOUNTER — HOSPITAL ENCOUNTER (INPATIENT)
Facility: HOSPITAL | Age: 49
LOS: 5 days | Discharge: HOME HEALTH | End: 2024-11-12
Attending: EMERGENCY MEDICINE | Admitting: STUDENT IN AN ORGANIZED HEALTH CARE EDUCATION/TRAINING PROGRAM
Payer: COMMERCIAL

## 2024-11-07 ENCOUNTER — OFFICE VISIT (OUTPATIENT)
Dept: FAMILY MEDICINE CLINIC | Facility: CLINIC | Age: 49
End: 2024-11-07
Payer: COMMERCIAL

## 2024-11-07 VITALS
HEART RATE: 109 BPM | SYSTOLIC BLOOD PRESSURE: 148 MMHG | BODY MASS INDEX: 26 KG/M2 | RESPIRATION RATE: 16 BRPM | WEIGHT: 186 LBS | TEMPERATURE: 100 F | DIASTOLIC BLOOD PRESSURE: 82 MMHG | OXYGEN SATURATION: 99 %

## 2024-11-07 DIAGNOSIS — L08.9 LEFT FOOT INFECTION: ICD-10-CM

## 2024-11-07 DIAGNOSIS — L08.9 DIABETIC INFECTION OF LEFT FOOT (HCC): Primary | ICD-10-CM

## 2024-11-07 DIAGNOSIS — E11.628 DIABETIC INFECTION OF LEFT FOOT (HCC): Primary | ICD-10-CM

## 2024-11-07 DIAGNOSIS — L03.119 CELLULITIS OF FOOT: Primary | ICD-10-CM

## 2024-11-07 PROBLEM — E78.5 HYPERLIPIDEMIA: Status: ACTIVE | Noted: 2024-11-07

## 2024-11-07 PROBLEM — L97.509 TYPE 2 DIABETES MELLITUS WITH FOOT ULCER, WITH LONG-TERM CURRENT USE OF INSULIN (HCC): Status: ACTIVE | Noted: 2024-11-07

## 2024-11-07 PROBLEM — Z79.4 TYPE 2 DIABETES MELLITUS WITH FOOT ULCER, WITH LONG-TERM CURRENT USE OF INSULIN (HCC): Status: ACTIVE | Noted: 2024-11-07

## 2024-11-07 PROBLEM — E11.621 TYPE 2 DIABETES MELLITUS WITH FOOT ULCER, WITH LONG-TERM CURRENT USE OF INSULIN (HCC): Status: ACTIVE | Noted: 2024-11-07

## 2024-11-07 PROBLEM — I10 PRIMARY HYPERTENSION: Status: ACTIVE | Noted: 2024-11-07

## 2024-11-07 LAB
ALBUMIN SERPL-MCNC: 3.6 G/DL (ref 3.4–5)
ALBUMIN/GLOB SERPL: 0.7 {RATIO} (ref 1–2)
ALP LIVER SERPL-CCNC: 106 U/L
ALT SERPL-CCNC: 19 U/L
ANION GAP SERPL CALC-SCNC: 5 MMOL/L (ref 0–18)
AST SERPL-CCNC: 10 U/L (ref 15–37)
BASOPHILS # BLD AUTO: 0.06 X10(3) UL (ref 0–0.2)
BASOPHILS NFR BLD AUTO: 0.4 %
BILIRUB SERPL-MCNC: 1 MG/DL (ref 0.1–2)
BUN BLD-MCNC: 11 MG/DL (ref 9–23)
CALCIUM BLD-MCNC: 9.7 MG/DL (ref 8.5–10.1)
CHLORIDE SERPL-SCNC: 101 MMOL/L (ref 98–112)
CO2 SERPL-SCNC: 28 MMOL/L (ref 21–32)
CREAT BLD-MCNC: 1.19 MG/DL
CRP SERPL-MCNC: 22.9 MG/DL (ref ?–0.3)
EGFRCR SERPLBLD CKD-EPI 2021: 74 ML/MIN/1.73M2 (ref 60–?)
EOSINOPHIL # BLD AUTO: 0.08 X10(3) UL (ref 0–0.7)
EOSINOPHIL NFR BLD AUTO: 0.5 %
ERYTHROCYTE [DISTWIDTH] IN BLOOD BY AUTOMATED COUNT: 12.9 %
ERYTHROCYTE [SEDIMENTATION RATE] IN BLOOD: 65 MM/HR
GLOBULIN PLAS-MCNC: 5.2 G/DL (ref 2.8–4.4)
GLUCOSE BLD-MCNC: 181 MG/DL (ref 70–99)
GLUCOSE BLD-MCNC: 200 MG/DL (ref 70–99)
GLUCOSE BLD-MCNC: 241 MG/DL (ref 70–99)
HCT VFR BLD AUTO: 37.5 %
HGB BLD-MCNC: 13.4 G/DL
IMM GRANULOCYTES # BLD AUTO: 0.07 X10(3) UL (ref 0–1)
IMM GRANULOCYTES NFR BLD: 0.5 %
LACTATE SERPL-SCNC: 1 MMOL/L (ref 0.4–2)
LYMPHOCYTES # BLD AUTO: 1.46 X10(3) UL (ref 1–4)
LYMPHOCYTES NFR BLD AUTO: 9.5 %
MCH RBC QN AUTO: 30.4 PG (ref 26–34)
MCHC RBC AUTO-ENTMCNC: 35.7 G/DL (ref 31–37)
MCV RBC AUTO: 85 FL
MONOCYTES # BLD AUTO: 0.88 X10(3) UL (ref 0.1–1)
MONOCYTES NFR BLD AUTO: 5.7 %
NEUTROPHILS # BLD AUTO: 12.77 X10 (3) UL (ref 1.5–7.7)
NEUTROPHILS # BLD AUTO: 12.77 X10(3) UL (ref 1.5–7.7)
NEUTROPHILS NFR BLD AUTO: 83.4 %
OSMOLALITY SERPL CALC.SUM OF ELEC: 283 MOSM/KG (ref 275–295)
PLATELET # BLD AUTO: 261 10(3)UL (ref 150–450)
POTASSIUM SERPL-SCNC: 3.9 MMOL/L (ref 3.5–5.1)
PROT SERPL-MCNC: 8.8 G/DL (ref 6.4–8.2)
RBC # BLD AUTO: 4.41 X10(6)UL
SODIUM SERPL-SCNC: 134 MMOL/L (ref 136–145)
WBC # BLD AUTO: 15.3 X10(3) UL (ref 4–11)

## 2024-11-07 PROCEDURE — 99215 OFFICE O/P EST HI 40 MIN: CPT | Performed by: NURSE PRACTITIONER

## 2024-11-07 PROCEDURE — 3077F SYST BP >= 140 MM HG: CPT | Performed by: NURSE PRACTITIONER

## 2024-11-07 PROCEDURE — 3079F DIAST BP 80-89 MM HG: CPT | Performed by: NURSE PRACTITIONER

## 2024-11-07 PROCEDURE — 73630 X-RAY EXAM OF FOOT: CPT | Performed by: EMERGENCY MEDICINE

## 2024-11-07 PROCEDURE — 99223 1ST HOSP IP/OBS HIGH 75: CPT | Performed by: STUDENT IN AN ORGANIZED HEALTH CARE EDUCATION/TRAINING PROGRAM

## 2024-11-07 RX ORDER — HYDROCODONE BITARTRATE AND ACETAMINOPHEN 5; 325 MG/1; MG/1
1 TABLET ORAL EVERY 4 HOURS PRN
Status: DISCONTINUED | OUTPATIENT
Start: 2024-11-07 | End: 2024-11-12

## 2024-11-07 RX ORDER — DEXTROSE MONOHYDRATE 25 G/50ML
50 INJECTION, SOLUTION INTRAVENOUS
Status: DISCONTINUED | OUTPATIENT
Start: 2024-11-07 | End: 2024-11-12

## 2024-11-07 RX ORDER — HYDROCODONE BITARTRATE AND ACETAMINOPHEN 5; 325 MG/1; MG/1
2 TABLET ORAL EVERY 4 HOURS PRN
Status: DISCONTINUED | OUTPATIENT
Start: 2024-11-07 | End: 2024-11-12

## 2024-11-07 RX ORDER — NICOTINE POLACRILEX 4 MG
30 LOZENGE BUCCAL
Status: DISCONTINUED | OUTPATIENT
Start: 2024-11-07 | End: 2024-11-12

## 2024-11-07 RX ORDER — BISACODYL 10 MG
10 SUPPOSITORY, RECTAL RECTAL
Status: DISCONTINUED | OUTPATIENT
Start: 2024-11-07 | End: 2024-11-12

## 2024-11-07 RX ORDER — HYDROMORPHONE HYDROCHLORIDE 1 MG/ML
0.4 INJECTION, SOLUTION INTRAMUSCULAR; INTRAVENOUS; SUBCUTANEOUS EVERY 2 HOUR PRN
Status: DISCONTINUED | OUTPATIENT
Start: 2024-11-07 | End: 2024-11-12

## 2024-11-07 RX ORDER — INSULIN DEGLUDEC 100 U/ML
10 INJECTION, SOLUTION SUBCUTANEOUS NIGHTLY
Status: DISCONTINUED | OUTPATIENT
Start: 2024-11-07 | End: 2024-11-10

## 2024-11-07 RX ORDER — LOSARTAN POTASSIUM 100 MG/1
100 TABLET ORAL DAILY
Status: DISCONTINUED | OUTPATIENT
Start: 2024-11-07 | End: 2024-11-12

## 2024-11-07 RX ORDER — AMLODIPINE BESYLATE 5 MG/1
10 TABLET ORAL DAILY
Status: DISCONTINUED | OUTPATIENT
Start: 2024-11-08 | End: 2024-11-12

## 2024-11-07 RX ORDER — PROCHLORPERAZINE EDISYLATE 5 MG/ML
5 INJECTION INTRAMUSCULAR; INTRAVENOUS EVERY 8 HOURS PRN
Status: DISCONTINUED | OUTPATIENT
Start: 2024-11-07 | End: 2024-11-12

## 2024-11-07 RX ORDER — ONDANSETRON 2 MG/ML
4 INJECTION INTRAMUSCULAR; INTRAVENOUS EVERY 6 HOURS PRN
Status: DISCONTINUED | OUTPATIENT
Start: 2024-11-07 | End: 2024-11-12

## 2024-11-07 RX ORDER — BENZONATATE 100 MG/1
200 CAPSULE ORAL 3 TIMES DAILY PRN
Status: DISCONTINUED | OUTPATIENT
Start: 2024-11-07 | End: 2024-11-12

## 2024-11-07 RX ORDER — HYDROMORPHONE HYDROCHLORIDE 1 MG/ML
0.8 INJECTION, SOLUTION INTRAMUSCULAR; INTRAVENOUS; SUBCUTANEOUS EVERY 2 HOUR PRN
Status: DISCONTINUED | OUTPATIENT
Start: 2024-11-07 | End: 2024-11-12

## 2024-11-07 RX ORDER — ENOXAPARIN SODIUM 100 MG/ML
40 INJECTION SUBCUTANEOUS DAILY
Status: DISCONTINUED | OUTPATIENT
Start: 2024-11-07 | End: 2024-11-12

## 2024-11-07 RX ORDER — SEMAGLUTIDE 0.68 MG/ML
0.5 INJECTION, SOLUTION SUBCUTANEOUS WEEKLY
Qty: 3 ML | Refills: 0 | Status: ON HOLD | OUTPATIENT
Start: 2024-11-07

## 2024-11-07 RX ORDER — ATORVASTATIN CALCIUM 20 MG/1
20 TABLET, FILM COATED ORAL NIGHTLY
Status: DISCONTINUED | OUTPATIENT
Start: 2024-11-07 | End: 2024-11-12

## 2024-11-07 RX ORDER — HYDROMORPHONE HYDROCHLORIDE 1 MG/ML
0.2 INJECTION, SOLUTION INTRAMUSCULAR; INTRAVENOUS; SUBCUTANEOUS EVERY 2 HOUR PRN
Status: DISCONTINUED | OUTPATIENT
Start: 2024-11-07 | End: 2024-11-12

## 2024-11-07 RX ORDER — ECHINACEA PURPUREA EXTRACT 125 MG
1 TABLET ORAL
Status: DISCONTINUED | OUTPATIENT
Start: 2024-11-07 | End: 2024-11-12

## 2024-11-07 RX ORDER — ACETAMINOPHEN 325 MG/1
650 TABLET ORAL EVERY 4 HOURS PRN
Status: DISCONTINUED | OUTPATIENT
Start: 2024-11-07 | End: 2024-11-12

## 2024-11-07 RX ORDER — SENNOSIDES 8.6 MG
17.2 TABLET ORAL NIGHTLY PRN
Status: DISCONTINUED | OUTPATIENT
Start: 2024-11-07 | End: 2024-11-12

## 2024-11-07 RX ORDER — POLYETHYLENE GLYCOL 3350 17 G/17G
17 POWDER, FOR SOLUTION ORAL DAILY PRN
Status: DISCONTINUED | OUTPATIENT
Start: 2024-11-07 | End: 2024-11-12

## 2024-11-07 RX ORDER — NICOTINE POLACRILEX 4 MG
15 LOZENGE BUCCAL
Status: DISCONTINUED | OUTPATIENT
Start: 2024-11-07 | End: 2024-11-12

## 2024-11-07 RX ORDER — SODIUM CHLORIDE 9 MG/ML
INJECTION, SOLUTION INTRAVENOUS CONTINUOUS
Status: DISCONTINUED | OUTPATIENT
Start: 2024-11-07 | End: 2024-11-10

## 2024-11-07 NOTE — PROGRESS NOTES
Chief Complaint   Patient presents with    Swelling     Did a lot of walking at recent trip, noticed bruising to R big toe, swelling to L foot and leg  X last night chills, felt faint, sweats, bilat ears closing up        HPI:    Farzad Romo is a 49 year old male presents with erythema, increased warmth abd tenderness to the palpation over left great toe and extending over entire left foot towards the ankle. Patient recently returned from the [place] And has developed left foot pain and swelling. He reports chills.   The current episode started in the past 3 days. The problem has been persisting and worsening since onset. Moderate, direct pressure makes it worse.Trigger: walking and travel to [place] Patient is a diabetic.   No prior treatments.    Current Outpatient Medications   Medication Sig Dispense Refill    OZEMPIC, 0.25 OR 0.5 MG/DOSE, 2 MG/3ML Subcutaneous Solution Pen-injector INJECT 0.5 MG UNDER THE SKIN ONCE A WEEK 3 mL 0    SEMGLEE, YFGN, 100 UNIT/ML Subcutaneous Solution Pen-injector Inject 25 Units into the skin nightly. (Patient not taking: Reported on 11/7/2024)      BD PEN NEEDLE MINI U/F 31G X 5 MM Does not apply Misc USE WITH SEMGLEE DAILY AS DIRECTED      amLODIPine 5 MG Oral Tab Take 1 tablet (5 mg total) by mouth 2 (two) times daily. Ok to take 1-2 tablets if BP average is over 150/90 60 tablet 5    LOSARTAN 100 MG Oral Tab TAKE 1 TABLET(100 MG) BY MOUTH DAILY 90 tablet 0    FREESTYLE SUNNY 3 SENSOR Does not apply Misc 1 each every 14 (fourteen) days. 2 each 11    METFORMIN HCL 1000 MG Oral Tab TAKE 1 TABLET(1000 MG) BY MOUTH TWICE DAILY (Patient not taking: Reported on 11/7/2024) 180 tablet 1    atorvastatin 20 MG Oral Tab Take 1 tablet (20 mg total) by mouth nightly. 30 tablet 5    Glucose Blood (CONTOUR NEXT TEST) In Vitro Strip 1 each by Other route 3 (three) times daily. 100 strip 5    montelukast 10 MG Oral Tab Take 1 tablet (10 mg total) by mouth nightly. 30 tablet 5    Blood  Pressure Monitor Does not apply Device Use to monitor blood pressure at home 1 each 0    Blood Pressure Monitoring Does not apply Device Monitor blood pressure daily 1 each 0    Blood Pressure Monitoring (BLOOD PRESSURE KIT) Does not apply Device 1 each daily. 1 each 2    cyanocobalamin ([employer] VITAMIN B12) 1000 MCG Oral Tab Take 1 tablet (1,000 mcg total) by mouth daily. (Patient not taking: Reported on 11/7/2024) 30 tablet 3    Blood Glucose Monitoring Suppl (CONTOUR NEXT EZ) w/Device Does not apply Kit 1 kit by Does not apply route daily. 1 kit 0    MICROLET LANCETS Does not apply Misc TEST THREE TIMES DAILY 300 each 0    aspirin (ASPIR-81) 81 MG Oral Tab EC Take 1 tablet by mouth daily. (Patient not taking: Reported on 11/7/2024) 1 tablet 0      Past Medical History:    Essential hypertension    Hyperlipidemia    Type II or unspecified type diabetes mellitus without mention of complication, not stated as uncontrolled      Past Surgical History:   Procedure Laterality Date    Appendectomy  10 yrs ago      Family History   Problem Relation Age of Onset    Hypertension Father     Diabetes Father     Hypertension Mother     Diabetes Mother     Diabetes Sister     Diabetes Sister     Diabetes Brother     Dementia Maternal Aunt     Dementia Maternal Aunt     Dementia Maternal Aunt       Social History     Socioeconomic History    Marital status: Single   Tobacco Use    Smoking status: Never     Passive exposure: Never    Smokeless tobacco: Never   Vaping Use    Vaping status: Never Used   Substance and Sexual Activity    Alcohol use: Not Currently     Comment: rarely    Drug use: No   Other Topics Concern    Caffeine Concern No     Comment: very little    Exercise Yes     Comment: 4x per week    Seat Belt Yes    Special Diet No    Stress Concern Yes    Weight Concern No         REVIEW OF SYSTEMS:   GENERAL: feels well otherwise, no fever, no chills.  SKIN: as above.No edema. No ulcerations.  CHEST: no chest pains, no  palpitations.  LUNGS: denies shortness of breath with exertion or rest.No wheezing, no cough.  LYMPH: no enlargement of the lymph nodes.  MUSC/SKEL: no joint swelling,no no joint stiffness.  CARDIOVASCULAR: denies chest pain on exertion or rest.  GI: no nausea, no vomiting or abdominal pain  NEURO: no abnormal sensation, no tingling of the skin or numbness.    EXAM:   /82   Pulse 109   Temp 100 °F (37.8 °C) (Oral)   Resp 16   Wt 186 lb (84.4 kg)   SpO2 99%   BMI 25.94 kg/m²   GENERAL: well developed, well nourished,in no apparent distress  SKIN: left foot with cellulitis  EYES:PERRLA, EOMI, normal optic disk,conjunctiva are clear  HEENT: head atraumatic, normocephalic,TM wnl tayo,no erythema of the throat.Moist oral and throat mucosa.  NOSE- normal external nose. Mucosa normal.  NECK: supple,no adenopathy  LUNGS: clear to auscultation  CARDIO: RRR without murmur  GI: good BS's,no masses, HSM or tenderness    ASSESSMENT AND PLAN:   Farzad Romo is a 49 year old male who presents with:    Encounter Diagnosis   Name Primary?    Cellulitis of foot Yes       Meds & Refills for this Visit:  Requested Prescriptions      No prescriptions requested or ordered in this encounter       Imaging & Consults:    TO  ED for evaluation and work up. Patient agrees to plan and verbalized understanding.

## 2024-11-07 NOTE — ED INITIAL ASSESSMENT (HPI)
Left lower leg and foot red/warm/swelling x 4 days. Temp 100 today[facility] Pt did return from [place] on Monday.

## 2024-11-07 NOTE — SPIRITUAL CARE NOTE
Spiritual Care Visit Note    Patient Name: Farzad Romo Date of Spiritual Care Visit: 24   : 1975 Primary Dx: Diabetic infection of left foot (HCC)       Referred By:      Spiritual Care Taxonomy:    Intended Effects: Build relationship of care and support    Methods: Offer support;Offer emotional support    Interventions: Active listening;Acknowledge current situation;Provide hospitality    Visit Type/Summary:     - Spiritual Care: Offered empathic listening and emotional support. Family expressed appreciation for  visit. Provided information regarding how to contact Spiritual Care and left a Spiritual Care information card.  remains available as needed for follow up.    Spiritual Care support can be requested via an [employer] consult. For urgent/immediate needs, please contact the On Call  at: [facility]: ext [phone]    Rev Corin Villar MA  Chaplain Resident

## 2024-11-07 NOTE — ED QUICK NOTES
Pt has a ready room.  MD is comfortable with pt going by private vehicle.  IV wrapped with coban.  Pt stable for drive to main campus.

## 2024-11-07 NOTE — H&P
[facility]     Farzad Romo Patient Status:  Inpatient    1975 MRN [id number]   Location [facility] 0SW-A Attending Ronny Mcmullen MD   Hosp Day # 0 PCP Lloyd Guaman MD     Chief Complaint: Foot infection    History of Present Illness: Farzad Romo is a 49 year old male with PMHx HTN, HLD, DM2 who presents for foot infection.     Patient notes that he was in the [place] approximately 1 week ago, had bought new shoes that he thought was memory foam however may have been tighter than usual.  States that he was walking quite a bit when he was there, notes that left foot was getting more swollen, erythematous, painful.  States that he did have ventral small ulceration prior to this however now has been noticing erythema on the dorsal aspect of the foot and great toe.  Notes associated chills, lightheadedness prior to admission.  Denies any other chest pain, shortness of breath, abdominal pain, nausea, vomiting    Past Medical History:  Past Medical History:    Essential hypertension    High blood pressure    Hyperlipidemia    Type II or unspecified type diabetes mellitus without mention of complication, not stated as uncontrolled    Visual impairment        Past Surgical History:   Past Surgical History:   Procedure Laterality Date    Appendectomy  10 yrs ago       Social History:  reports that he has never smoked. He has never been exposed to tobacco smoke. He has never used smokeless tobacco. He reports that he does not currently use alcohol. He reports that he does not use drugs.    Family History:   Family History   Problem Relation Age of Onset    Hypertension Father     Diabetes Father     Hypertension Mother     Diabetes Mother     Diabetes Sister     Diabetes Sister     Diabetes Brother     Dementia Maternal Aunt     Dementia Maternal Aunt     Dementia Maternal Aunt        Allergies: Allergies[1]    Medications:  Medications Ordered Prior to  Encounter[2]    Review of Systems:   A comprehensive 14 point review of systems was completed.    Pertinent positives and negatives noted in the HPI.    Physical Exam:    BP (!) 174/91 (BP Location: Right arm)   Pulse 103   Temp 99.5 °F (37.5 °C) (Oral)   Resp 18   Ht 5' 10\" (1.778 m)   Wt 186 lb (84.4 kg)   SpO2 99%   BMI 26.69 kg/m²   General: No acute distress. Alert and oriented x 3.  HEENT: Normocephalic atraumatic. Moist mucous membranes. EOM-I. PERRLA. Anicteric.  Neck: No lymphadenopathy. No JVD. No carotid bruits.  Respiratory: Clear to auscultation bilaterally. No wheezes. No rhonchi.  Cardiovascular: S1, S2. Regular rate and rhythm. No murmurs, rubs or gallops. Equal pulses.   Chest and Back: No tenderness or deformity.  Abdomen: Soft, nontender, nondistended.  Positive bowel sounds. No rebound, guarding or organomegaly.  Neurologic: No focal neurological deficits. CNII-XII grossly intact.  Musculoskeletal: Moves all extremities.  Extremities: Left foot with diffuse edema, dorsal erythema, ventral small ulceration with serous drainage.  Integument: No rashes or lesions.   Psychiatric: Appropriate mood and affect.    Diagnostic Data:      Labs:  Recent Labs   Lab 11/07/24  1048   WBC 15.3*   HGB 13.4   MCV 85.0   .0       Recent Labs   Lab 11/07/24  1049   *   BUN 11   CREATSERUM 1.19   CA 9.7   ALB 3.6   *   K 3.9      CO2 28.0   ALKPHO 106   AST 10*   ALT 19   BILT 1.0   TP 8.8*       Estimated Creatinine Clearance: 75.8 mL/min (based on SCr of 1.19 mg/dL).    No results for input(s): \"PTP\", \"INR\" in the last 168 hours.    No results for input(s): \"TROP\", \"CK\" in the last 168 hours.    Imaging: Imaging data reviewed in Epic.  XR FOOT, COMPLETE (MIN 3 VIEWS), LEFT (CPT=73630)    Result Date: 11/7/2024  CONCLUSION:  1. Soft tissue swelling particularly adjacent to the 1st metatarsal phalangeal joint where there are few punctate lucent foci, neither an abscess nor  osteomyelitis can be excluded, recommend MRI for further evaluation as clinically indicated.   LOCATION:  [facility]   Dictated by (CST): Jayne Pina MD on 11/07/2024 at 11:48 AM     Finalized by (CST): Jayne Pina MD on 11/07/2024 at 11:50 AM          ASSESSMENT / PLAN:     # Diabetic foot infection    - IV Abx continued, f/u Bcx   - Podiatry/ID consulted   - ESR/CPR ordered   - XR foot cannot r/o osteomyelitis; MRI L foot ordered    # DM2 - Degludec +  LDSSI; Last A1c value was 7.9% done 10/11/2024.  # Hypertension - Continue home oral antihypertensives  # Hyperlipidemia - continue home statin       Code Status: Not on file    Plan of care discussed with patient    Kirk Janelle Carbone MD  11/7/2024             [1]   Allergies  Allergen Reactions    [place] HIVES    [place] (Diagnostic) RASH    Seasonal Runny nose   [2]   No current facility-administered medications on file prior to encounter.     Current Outpatient Medications on File Prior to Encounter   Medication Sig Dispense Refill    OZEMPIC, 0.25 OR 0.5 MG/DOSE, 2 MG/3ML Subcutaneous Solution Pen-injector INJECT 0.5 MG UNDER THE SKIN ONCE A WEEK 3 mL 0    amLODIPine 5 MG Oral Tab Take 1 tablet (5 mg total) by mouth 2 (two) times daily. Ok to take 1-2 tablets if BP average is over 150/90 60 tablet 5    LOSARTAN 100 MG Oral Tab TAKE 1 TABLET(100 MG) BY MOUTH DAILY 90 tablet 0    atorvastatin 20 MG Oral Tab Take 1 tablet (20 mg total) by mouth nightly. 30 tablet 5    Glucose Blood (CONTOUR NEXT TEST) In Vitro Strip 1 each by Other route 3 (three) times daily. 100 strip 5    BD PEN NEEDLE MINI U/F 31G X 5 MM Does not apply Misc USE WITH SEMGLEE DAILY AS DIRECTED      [DISCONTINUED] OZEMPIC, 0.25 OR 0.5 MG/DOSE, 2 MG/3ML Subcutaneous Solution Pen-injector INJECT 0.5 MG UNDER THE SKIN ONCE A WEEK 3 mL 0    FREESTYLE SUNNY 3 SENSOR Does not apply Misc 1 each every 14 (fourteen) days. 2 each 11    Blood Pressure Monitor Does not apply Device Use to monitor blood pressure  at home 1 each 0    Blood Pressure Monitoring Does not apply Device Monitor blood pressure daily 1 each 0    Blood Pressure Monitoring (BLOOD PRESSURE KIT) Does not apply Device 1 each daily. 1 each 2    Blood Glucose Monitoring Suppl (CONTOUR NEXT EZ) w/Device Does not apply Kit 1 kit by Does not apply route daily. 1 kit 0    MICROLET LANCETS Does not apply Misc TEST THREE TIMES DAILY 300 each 0

## 2024-11-07 NOTE — ED PROVIDER NOTES
Patient Seen in: [facility] [place]      History     Chief Complaint   Patient presents with    Cellulitis     Stated Complaint: left foot pain and swelling for 5 days    Subjective:   HPI      49-year-old male presents for evaluation of left foot pain and redness over the past 5 days since returning from the [place] on Monday.  He has had a chronic ulceration to the bottom of his left foot for quite some time but the redness swelling and pain just started after he returned.  He is a diabetic    Objective:     Past Medical History:    Essential hypertension    Hyperlipidemia    Type II or unspecified type diabetes mellitus without mention of complication, not stated as uncontrolled              Past Surgical History:   Procedure Laterality Date    Appendectomy  10 yrs ago                Social History     Socioeconomic History    Marital status: Single   Tobacco Use    Smoking status: Never     Passive exposure: Never    Smokeless tobacco: Never   Vaping Use    Vaping status: Never Used   Substance and Sexual Activity    Alcohol use: Not Currently     Comment: rarely    Drug use: No   Other Topics Concern    Caffeine Concern No     Comment: very little    Exercise Yes     Comment: 4x per week    Seat Belt Yes    Special Diet No    Stress Concern Yes    Weight Concern No                  Physical Exam     ED Triage Vitals   BP 11/07/24 1033 145/80   Pulse 11/07/24 1033 112   Resp 11/07/24 1033 16   Temp 11/07/24 1033 98.4 °F (36.9 °C)   Temp src 11/07/24 1142 Oral   SpO2 11/07/24 1033 98 %   O2 Device 11/07/24 1033 None (Room air)       Current Vitals:   Vital Signs  BP: 153/87  Pulse: 97  Resp: 16  Temp: 99.5 °F (37.5 °C)  Temp src: Oral    Oxygen Therapy  SpO2: 99 %  O2 Device: None (Room air)        Physical Exam    STRONG DISTAL PULSES             ED Course     Labs Reviewed   COMP METABOLIC PANEL (14) - Abnormal; Notable for the following components:       Result Value     Glucose 200 (*)     Sodium 134 (*)     AST 10 (*)     Total Protein 8.8 (*)     Globulin  5.2 (*)     A/G Ratio 0.7 (*)     All other components within normal limits   CBC WITH DIFFERENTIAL WITH PLATELET - Abnormal; Notable for the following components:    WBC 15.3 (*)     HCT 37.5 (*)     Neutrophil Absolute Prelim 12.77 (*)     Neutrophil Absolute 12.77 (*)     All other components within normal limits   LACTIC ACID, PLASMA - Normal   RAINBOW DRAW LAVENDER   RAINBOW DRAW LIGHT GREEN   BLOOD CULTURE   BLOOD CULTURE                   MDM      Differential diagnosis includes gout, cellulitis, necrotizing fasciitis    Chemistry with glucose of 200.  No acidosis.  CBC with leukocytosis 15.3    XR FOOT, COMPLETE (MIN 3 VIEWS), LEFT (CPT=73630)    Result Date: 11/7/2024  PROCEDURE:  XR FOOT, COMPLETE (MIN 3 VIEWS), LEFT (CPT=73630)  TECHNIQUE:  AP, oblique, and lateral views were obtained.  COMPARISON:  None.  INDICATIONS:  left foot pain and swelling for 5 days  PATIENT STATED HISTORY: (As transcribed by Technologist)  Patient has swelling and redness to the left foot for 5 days.  He also has an open wound on the planter side of his foot.  States he started having a swelling to the 1st digit and then it spread to the dorsal side of the foot.    FINDINGS:  BONES:  Osseous structures are intact.  There is soft tissue swelling particularly adjacent to the 1st metatarsal phalangeal joint where there are few punctate lucent foci.  No dislocation.            CONCLUSION:  1. Soft tissue swelling particularly adjacent to the 1st metatarsal phalangeal joint where there are few punctate lucent foci, neither an abscess nor osteomyelitis can be excluded, recommend MRI for further evaluation as clinically indicated.   LOCATION:  [facility]   Dictated by (CST): Jayne Pina MD on 11/07/2024 at 11:48 AM     Finalized by (CST): Jayne Pina MD on 11/07/2024 at 11:50 AM        Medications   sodium chloride 0.9 % IV bolus 1,000 mL (1,000 mL  Intravenous New Bag 11/7/24 1139)   ampicillin-sulbactam (Unasyn) 3 g in sodium chloride 0.9% 100mL IVPB-ADD (0 g Intravenous Stopped 11/7/24 1211)     Spoke with Dr. Arreola podiatry.  Spoke with Dr Dhillon for admission    Medical Decision Making      Disposition and Plan     Clinical Impression:  1. Diabetic infection of left foot (HCC)         Disposition:  There is no disposition on file for this visit.  There is no disposition time on file for this visit.    Follow-up:  No follow-up provider specified.        Medications Prescribed:  Current Discharge Medication List              Supplementary Documentation:

## 2024-11-07 NOTE — ED QUICK NOTES
Orders for admission, patient is aware of plan and ready to go upstairs. Any questions, please call ED RN diallo at extension [phone].     Patient Covid vaccination status: Fully vaccinated     COVID Test Ordered in ED: None    COVID Suspicion at Admission: N/A    Running Infusions:      Mental Status/LOC at time of transport: A&Ox3    Other pertinent information:   CIWA score: N/A   NIH score:  N/A

## 2024-11-07 NOTE — PROGRESS NOTES
NURSING ADMISSION NOTE      Patient admitted via Wheelchair  Oriented to room.  Safety precautions initiated.  Bed in low position.  Call light in reach.

## 2024-11-08 ENCOUNTER — APPOINTMENT (OUTPATIENT)
Dept: MRI IMAGING | Facility: HOSPITAL | Age: 49
End: 2024-11-08
Attending: STUDENT IN AN ORGANIZED HEALTH CARE EDUCATION/TRAINING PROGRAM
Payer: COMMERCIAL

## 2024-11-08 PROBLEM — L02.612 ABSCESS OF LEFT FOOT: Status: ACTIVE | Noted: 2024-11-08

## 2024-11-08 PROBLEM — M86.9 OSTEOMYELITIS OF LEFT FOOT (HCC): Status: ACTIVE | Noted: 2024-11-08

## 2024-11-08 LAB
ALBUMIN SERPL-MCNC: 3.9 G/DL (ref 3.2–4.8)
ALBUMIN/GLOB SERPL: 1.3 {RATIO} (ref 1–2)
ALP LIVER SERPL-CCNC: 85 U/L
ALT SERPL-CCNC: 10 U/L
ANION GAP SERPL CALC-SCNC: 5 MMOL/L (ref 0–18)
AST SERPL-CCNC: 11 U/L (ref ?–34)
BASOPHILS # BLD AUTO: 0.05 X10(3) UL (ref 0–0.2)
BASOPHILS NFR BLD AUTO: 0.5 %
BILIRUB SERPL-MCNC: 0.7 MG/DL (ref 0.3–1.2)
BUN BLD-MCNC: 9 MG/DL (ref 9–23)
CALCIUM BLD-MCNC: 8.6 MG/DL (ref 8.7–10.4)
CHLORIDE SERPL-SCNC: 106 MMOL/L (ref 98–112)
CO2 SERPL-SCNC: 28 MMOL/L (ref 21–32)
CREAT BLD-MCNC: 0.9 MG/DL
EGFRCR SERPLBLD CKD-EPI 2021: 103 ML/MIN/1.73M2 (ref 60–?)
EOSINOPHIL # BLD AUTO: 0.2 X10(3) UL (ref 0–0.7)
EOSINOPHIL NFR BLD AUTO: 2 %
ERYTHROCYTE [DISTWIDTH] IN BLOOD BY AUTOMATED COUNT: 12.7 %
GLOBULIN PLAS-MCNC: 3 G/DL (ref 2–3.5)
GLUCOSE BLD-MCNC: 146 MG/DL (ref 70–99)
GLUCOSE BLD-MCNC: 151 MG/DL (ref 70–99)
GLUCOSE BLD-MCNC: 153 MG/DL (ref 70–99)
GLUCOSE BLD-MCNC: 167 MG/DL (ref 70–99)
GLUCOSE BLD-MCNC: 218 MG/DL (ref 70–99)
HCT VFR BLD AUTO: 31.4 %
HGB BLD-MCNC: 10.9 G/DL
IMM GRANULOCYTES # BLD AUTO: 0.02 X10(3) UL (ref 0–1)
IMM GRANULOCYTES NFR BLD: 0.2 %
INR BLD: 1.26 (ref 0.8–1.2)
LYMPHOCYTES # BLD AUTO: 1.33 X10(3) UL (ref 1–4)
LYMPHOCYTES NFR BLD AUTO: 13.2 %
MAGNESIUM SERPL-MCNC: 1.6 MG/DL (ref 1.6–2.6)
MCH RBC QN AUTO: 29.2 PG (ref 26–34)
MCHC RBC AUTO-ENTMCNC: 34.7 G/DL (ref 31–37)
MCV RBC AUTO: 84.2 FL
MONOCYTES # BLD AUTO: 0.7 X10(3) UL (ref 0.1–1)
MONOCYTES NFR BLD AUTO: 6.9 %
NEUTROPHILS # BLD AUTO: 7.78 X10 (3) UL (ref 1.5–7.7)
NEUTROPHILS # BLD AUTO: 7.78 X10(3) UL (ref 1.5–7.7)
NEUTROPHILS NFR BLD AUTO: 77.2 %
OSMOLALITY SERPL CALC.SUM OF ELEC: 290 MOSM/KG (ref 275–295)
PHOSPHATE SERPL-MCNC: 3 MG/DL (ref 2.4–5.1)
PLATELET # BLD AUTO: 201 10(3)UL (ref 150–450)
POTASSIUM SERPL-SCNC: 3.7 MMOL/L (ref 3.5–5.1)
PROT SERPL-MCNC: 6.9 G/DL (ref 5.7–8.2)
PROTHROMBIN TIME: 15.9 SECONDS (ref 11.6–14.8)
RBC # BLD AUTO: 3.73 X10(6)UL
SODIUM SERPL-SCNC: 139 MMOL/L (ref 136–145)
WBC # BLD AUTO: 10.1 X10(3) UL (ref 4–11)

## 2024-11-08 PROCEDURE — 73720 MRI LWR EXTREMITY W/O&W/DYE: CPT | Performed by: STUDENT IN AN ORGANIZED HEALTH CARE EDUCATION/TRAINING PROGRAM

## 2024-11-08 PROCEDURE — 99222 1ST HOSP IP/OBS MODERATE 55: CPT | Performed by: PODIATRIST

## 2024-11-08 PROCEDURE — 99232 SBSQ HOSP IP/OBS MODERATE 35: CPT | Performed by: INTERNAL MEDICINE

## 2024-11-08 RX ORDER — DIPHENHYDRAMINE HYDROCHLORIDE 50 MG/ML
20 INJECTION, SOLUTION INTRAMUSCULAR; INTRAVENOUS
Status: COMPLETED | OUTPATIENT
Start: 2024-11-08 | End: 2024-11-08

## 2024-11-08 RX ORDER — MAGNESIUM OXIDE 400 MG/1
400 TABLET ORAL ONCE
Status: COMPLETED | OUTPATIENT
Start: 2024-11-08 | End: 2024-11-08

## 2024-11-08 NOTE — CONSULTS
[facility]   part of [facility] ID CONSULT NOTE    Farzad Romo Patient Status:  Inpatient    1975 MRN [id number]   Location [facility] 0SW-A Attending Diya Ferguson, DO   Hosp Day # 1 PCP Lloyd Guaman MD       Reason for Consultation:  DFU    History of Present Illness:  Farzad Romo is a 49 year old male with a history of DM II, HTN and HLD. Patient presents with left foot pain/redness/swelling that started about 5 days prior to admission.    Patient reports that he has had an ulceration to his plantar foot for a few months (initially started as a callus) that has been stable. He went to the [place] on vacation from 10/30-. He reports that he bought new shoes for the trip that had some memory foam in them. He feels that the shoes were too tight due to the memory foam and he started to notice some bruising to his 1st toe with some bloody drainage from under his nail while on this trip. When he returned he noticed worsening pain with erythema and swelling extending to his foot. He also reports edema to his ankle and leg. He reports chills on  and states he felt hot on . Denies being on any abx prior to admission.     Patient states that he walked on the beach with shoes on but did not go in the water. Tmax here 100.1. WBC 15.3K on admission, now wnl.     Patient reports that the foot looks a little bit better today.     History:  Past Medical History:    Essential hypertension    High blood pressure    Hyperlipidemia    Type II or unspecified type diabetes mellitus without mention of complication, not stated as uncontrolled    Visual impairment     Past Surgical History:   Procedure Laterality Date    Appendectomy  10 yrs ago     Family History   Problem Relation Age of Onset    Hypertension Father     Diabetes Father     Hypertension Mother     Diabetes Mother     Diabetes Sister     Diabetes Sister     Diabetes Brother     Dementia Maternal Aunt      Dementia Maternal Aunt     Dementia Maternal Aunt       reports that he has never smoked. He has never been exposed to tobacco smoke. He has never used smokeless tobacco. He reports that he does not currently use alcohol. He reports that he does not use drugs.    Allergies:  Allergies[1]    Medications:    Current Facility-Administered Medications:     magnesium oxide (Mag-Ox) tab 400 mg, 400 mg, Oral, Once    amLODIPine (Norvasc) tab 10 mg, 10 mg, Oral, Daily    atorvastatin (Lipitor) tab 20 mg, 20 mg, Oral, Nightly    losartan (Cozaar) tab 100 mg, 100 mg, Oral, Daily    glucose (Dex4) 15 GM/59ML oral liquid 15 g, 15 g, Oral, Q15 Min PRN **OR** glucose (Glutose) 40% oral gel 15 g, 15 g, Oral, Q15 Min PRN **OR** glucose-vitamin C (Dex-4) chewable tab 4 tablet, 4 tablet, Oral, Q15 Min PRN **OR** dextrose 50% injection 50 mL, 50 mL, Intravenous, Q15 Min PRN **OR** glucose (Dex4) 15 GM/59ML oral liquid 30 g, 30 g, Oral, Q15 Min PRN **OR** glucose (Glutose) 40% oral gel 30 g, 30 g, Oral, Q15 Min PRN **OR** glucose-vitamin C (Dex-4) chewable tab 8 tablet, 8 tablet, Oral, Q15 Min PRN    insulin aspart (NovoLOG) 100 Units/mL FlexPen 1-10 Units, 1-10 Units, Subcutaneous, TID AC and HS    insulin degludec (Tresiba) 100 units/mL flextouch 10 Units, 10 Units, Subcutaneous, Nightly    ampicillin-sulbactam (Unasyn) 3 g in sodium chloride 0.9% 100mL IVPB-ADD, 3 g, Intravenous, q6h    melatonin tab 3 mg, 3 mg, Oral, Nightly PRN    polyethylene glycol (PEG 3350) (Miralax) 17 g oral packet 17 g, 17 g, Oral, Daily PRN    sennosides (Senokot) tab 17.2 mg, 17.2 mg, Oral, Nightly PRN    bisacodyl (Dulcolax) 10 MG rectal suppository 10 mg, 10 mg, Rectal, Daily PRN    ondansetron (Zofran) 4 MG/2ML injection 4 mg, 4 mg, Intravenous, Q6H PRN    prochlorperazine (Compazine) 10 MG/2ML injection 5 mg, 5 mg, Intravenous, Q8H PRN    benzonatate (Tessalon) cap 200 mg, 200 mg, Oral, TID PRN    guaiFENesin (Robitussin) 100 MG/5 ML oral liquid  200 mg, 200 mg, Oral, Q4H PRN    glycerin-hypromellose- (Artificial Tears) 0.2-0.2-1 % ophthalmic solution 1 drop, 1 drop, Both Eyes, QID PRN    sodium chloride (Saline Mist) 0.65 % nasal solution 1 spray, 1 spray, Each Nare, Q3H PRN    sodium chloride 0.9% infusion, , Intravenous, Continuous    enoxaparin (Lovenox) 40 MG/0.4ML SUBQ injection 40 mg, 40 mg, Subcutaneous, Daily    acetaminophen (Tylenol) tab 650 mg, 650 mg, Oral, Q4H PRN **OR** HYDROcodone-acetaminophen (Norco) 5-325 MG per tab 1 tablet, 1 tablet, Oral, Q4H PRN **OR** HYDROcodone-acetaminophen (Norco) 5-325 MG per tab 2 tablet, 2 tablet, Oral, Q4H PRN    HYDROmorphone (Dilaudid) 1 MG/ML injection 0.2 mg, 0.2 mg, Intravenous, Q2H PRN **OR** HYDROmorphone (Dilaudid) 1 MG/ML injection 0.4 mg, 0.4 mg, Intravenous, Q2H PRN **OR** HYDROmorphone (Dilaudid) 1 MG/ML injection 0.8 mg, 0.8 mg, Intravenous, Q2H PRN    Review of Systems:  CONSTITUTIONAL:  No weakness or fatigue.  HEENT:  Eyes:  No visual loss. Ears, Nose, Throat:  No hearing loss,  SKIN:  No rash or itching.  CARDIOVASCULAR:  No chest pain  RESPIRATORY:  No shortness of breath, cough or sputum.  GASTROINTESTINAL:  No nausea, vomiting or diarrhea. No abdominal pain  GENITOURINARY:  No Burning on urination.   NEUROLOGICAL:  No headache  MUSCULOSKELETAL: + L foot pain  HEMATOLOGIC: No anemia  ALLERGIES:  No history of asthma    Physical Exam:  Vital signs: Blood pressure 133/81, pulse 86, temperature 98.9 °F (37.2 °C), temperature source Oral, resp. rate 19, height 177.8 cm (5' 10\"), weight 186 lb 1.1 oz (84.4 kg), SpO2 97%.    General: Alert, oriented, NAD, on room air.   HEENT: Moist mucous membranes.   Neck: No lymphadenopathy.  Supple.  Cardiovascular: RRR  Respiratory: Clear to auscultation bilaterally.  No wheezes. No rhonchi.  Abdomen: Soft, nontender, nondistended.   Musculoskeletal: LLE edema.   Integument: Left foot swelling with erythema to the dorsal aspect/1st hallux. Open wound  to plantar foot that probes deep, some serosanguineous drainage noted. See pictures below.              Laboratory Data:  Recent Labs   Lab 11/08/24  0725   RBC 3.73*   HGB 10.9*   HCT 31.4*   MCV 84.2   MCH 29.2   MCHC 34.7   RDW 12.7   NEPRELIM 7.78*   WBC 10.1   .0     Recent Labs   Lab 11/07/24  1049 11/08/24  0725   * 153*   BUN 11 9   CREATSERUM 1.19 0.90   CA 9.7 8.6*   ALB 3.6 3.9   * 139   K 3.9 3.7    106   CO2 28.0 28.0   ALKPHO 106 85   AST 10* 11   ALT 19 10   BILT 1.0 0.7   TP 8.8* 6.9       Microbiology: Reviewed in EMR    Radiology: Reviewed.    PROCEDURE:  MRI FOOT (W+WO), LEFT (CPT=73720)     LOCATION:  [facility]     COMPARISON:  None.     INDICATIONS:  Ulcer to plantar surface of 1st digit.  Evaluate for infection/osteomyelitis.     TECHNIQUE:  A variety of imaging planes and parameters were utilized for visualization of suspected pathology.  Images were performed without and with intravenous gadolinium contrast.     PATIENT STATED HISTORY:(As transcribed by Technologist)  Patient presents with ulcer to the plantar surface of the first digit, pain, redness, and swelling to the left foot. Symptoms started about a week ago.      CONTRAST USED:  17 mL of Dotarem     FINDINGS:    Soft tissue ulceration noted along the plantar aspect of the 1st MTP joint.  Moderate, diffuse subcutaneous edema of the forefoot with asymmetric hyperemia along the plantar aspect of the 1st MTP joint most in keeping with cellulitis.  Heterogeneously  enhancing phlegmon or developing abscess along the plantar aspect of the 1st MTP joint measures 2.5 x 1.1 x 2.4 cm (series 15, image 23, series 13, image 13).  This encases the hallux sesamoids.       Note made of bipartite medial hallux sesamoid with marrow edema/hyperemia of the distal sesamoid fragment (series 8, image 13, series 13, image 13), additionally with prominent T1 signal hypointensity of both sesamoid fragments.  Sagittal T1  sequence  demonstrates cortical irregularity along the plantar aspect of the distal sesamoid fragment (series 10, image 21).  Imaging features raise suspicion for early changes of acute osteomyelitis involving the distal sesamoid fragment.       Minimal fluid distention of the tendon sheath of the flexor hallucis longus consistent mild tenosynovitis, possibly infectious.     The aforementioned soft tissue ulceration along the plantar aspect of the 1st MTP joint continues as a tract into the deeper subcutaneous tissues along the plantar aspect of the 1st MTP joint.  There is a small effusion of the 1st MTP joint, perhaps with   subtle marrow edema flanking the joint.  In the absence of significant osteoarthritis, findings raise the possibility of joint infection.     Edema and atrophy of the intrinsic forefoot musculature, likely sequela of chronic diabetic neuropathy.     No fracture or malalignment of the forefoot.     Impression:    CONCLUSION:       1. Skin ulceration along the plantar aspect of the 1st MTP joint with moderate regional subcutaneous edema/hyperemia most in keeping with cellulitis.  Heterogeneously enhancing phlegmon or developing abscess along the plantar aspect of the 1st MTP joint  measures 2.5 x 1.1 x 2.4 cm.     2. The previously referenced phlegmon or developing abscess along the plantar aspect of the 1st MTP joint encases the hallux sesamoids with cortical irregularity and signal alteration of the distal sesamoid fragment (of the bipartite medial hallux  sesamoid) suspicious for acute osteomyelitis.     3. The skin ulceration along the plantar aspect of the 1st MTP joint continues as a thin tract into the deeper soft tissues where there is an associated small effusion of the 1st MTP joint.  This could represent reactive or infected joint fluid.     4. Mild fluid distention of the flexor digitorum longus tendon sheath consistent with mild tenosynovitis, possibly infectious.       LOCATION:   [facility]     Dictated by (CST): Yobani Trinidad MD on 11/08/2024 at 10:48 AM      Finalized by (CST): Yobnai Trinidad MD on 11/08/2024 at 11:11 AM        PROCEDURE:  XR FOOT, COMPLETE (MIN 3 VIEWS), LEFT (CPT=73630)     TECHNIQUE:  AP, oblique, and lateral views were obtained.     COMPARISON:  None.     INDICATIONS:  left foot pain and swelling for 5 days     PATIENT STATED HISTORY: (As transcribed by Technologist)  Patient has swelling and redness to the left foot for 5 days.  He also has an open wound on the planter side of his foot.  States he started having a swelling to the 1st digit and then it spread  to the dorsal side of the foot.      FINDINGS:    BONES:  Osseous structures are intact.  There is soft tissue swelling particularly adjacent to the 1st metatarsal phalangeal joint where there are few punctate lucent foci.  No dislocation.     Impression:    CONCLUSION:    1. Soft tissue swelling particularly adjacent to the 1st metatarsal phalangeal joint where there are few punctate lucent foci, neither an abscess nor osteomyelitis can be excluded, recommend MRI for further evaluation as clinically indicated.     LOCATION:  [facility]     Dictated by (CST): Jayne Pina MD on 11/07/2024 at 11:48 AM      Finalized by (CST): Jayne Pina MD on 11/07/2024 at 11:50 AM    ASSESSMENT:  Left diabetic foot infection with cellulitis, abscess and OM  MRI c/w abscess/phlegmon along plantar aspect of 1st MTP, OM of hallux sesamoid and tenosynovitis; also with small effusion of 1st MTP joint.   Left plantar foot ulcer  Leukocytosis on admission, resolved. D/t above.   DM II, hgb A1C 7.9 (10/2024)  HTN, HLD    PLAN:  - will dc IV Unasyn and start IV Zosyn   - follow blood cultures  - culture sent from plantar foot wound  - follow temps and wbc  - await MRI of the foot  - podiatry on consult  - anticipate will need IV abx at the time of dc  - reviewed labs, micro, imaging reports, available old records    Discussed case with RN, Dr. Willingham and  patient.     Thank you for allowing us to participate in the care of this patient. Please do not hesitate to call if you have any questions.   We will continue to follow with you and will make further recommendations based on his progress.    [facility]  [phone]  11/8/2024    ID ATTENDING ADDENDUM     Pt seen an examined independently on 11/8, this is a late entry. Chart reviewed. Agree with above. Note has been reviewed by me and modified as needed.  Exam and Impression/ Recs as noted above.  Will follow  D/w staff and with pt  More than 50% of clinical time and 100% of the clinical decision making performed by me.    Sara Willingham MD         [1]   Allergies  Allergen Reactions    [place] HIVES    [place] (Diagnostic) RASH    Seasonal Runny nose

## 2024-11-08 NOTE — PROGRESS NOTES
[facility]   part of [facility]     Hospitalist Progress Note     Farzaddaniela Romo Patient Status:  Inpatient    1975 MRN [id number]   Location [facility] 0-A Attending Diya Ferguson, DO   Hosp Day # 1 PCP Lloyd Guaman MD     Chief Complaint: foot swelling and pain    Subjective:     Patient resting comfortably in bed; just returned from MRI. Pain controlled and denies any fevers/chills overnight    Objective:    Review of Systems:   A comprehensive review of systems was completed; pertinent positive and negatives stated in subjective.    Vital signs:  Temp:  [98.6 °F (37 °C)-100.1 °F (37.8 °C)] 99.1 °F (37.3 °C)  Pulse:  [81-96] 89  Resp:  [16-25] 16  BP: (133-176)/(74-91) 143/74  SpO2:  [92 %-99 %] 98 %    Physical Exam:    General: No acute distress  Respiratory: No wheezes, no rhonchi  Cardiovascular: S1, S2, regular rate and rhythm  Abdomen: Soft, Non-tender, non-distended, positive bowel sounds  Neuro: No new focal deficits.   Extremities: No edema    Diagnostic Data:    Labs:  Recent Labs   Lab 24  1048 24  0725   WBC 15.3* 10.1   HGB 13.4 10.9*   MCV 85.0 84.2   .0 201.0   INR  --  1.26*       Recent Labs   Lab 24  1049 24  0725   * 153*   BUN 11 9   CREATSERUM 1.19 0.90   CA 9.7 8.6*   ALB 3.6 3.9   * 139   K 3.9 3.7    106   CO2 28.0 28.0   ALKPHO 106 85   AST 10* 11   ALT 19 10   BILT 1.0 0.7   TP 8.8* 6.9       Estimated Creatinine Clearance: 100.3 mL/min (based on SCr of 0.9 mg/dL).    No results for input(s): \"TROP\", \"TROPHS\", \"CK\" in the last 168 hours.    Recent Labs   Lab 24  0725   PTP 15.9*   INR 1.26*        Microbiology    No results found for this visit on 24.      Imaging: Reviewed in Epic.    Medications:    piperacillin-tazobactam  3.375 g Intravenous Q8H    amLODIPine  10 mg Oral Daily    atorvastatin  20 mg Oral Nightly    losartan  100 mg Oral Daily    insulin aspart  1-10 Units Subcutaneous TID  AC and HS    insulin degludec  10 Units Subcutaneous Nightly    enoxaparin  40 mg Subcutaneous Daily       Assessment & Plan:      #Left Diabetic foot infection with abscess and possible tenosynovitis   #LLE cellulitis  - empiric zosyn  - follow cultures  - Podiatry/ID consulted  - XR foot cannot r/o osteomyelitis  - MRI L foot concerning for possible developing abscess and tenosynovitis      # DM2  - HgbA1c 7.9%  - hyperglycemia protocol    - Degludec 10u at bedtime, ICF 1:30 > adjust PRN    # HTN  - PTA amlodipine    #HLD   - statin      Diya Ferguson,     Supplementary Documentation:     Quality:  DVT Mechanical Prophylaxis:   SCDs, Early ambuation  DVT Pharmacologic Prophylaxis   Medication    enoxaparin (Lovenox) 40 MG/0.4ML SUBQ injection 40 mg                Code Status: Not on file  Banks: No urinary catheter in place  Banks Duration (in days):   Central line:    RAGHAVENDRA: 11/7/2024    Discharge is dependent on: clinical state  At this point Mr. Romo is expected to be discharge to: home     The 21st Century Cures Act makes medical notes like these available to patients in the interest of transparency. Please be advised this is a medical document. Medical documents are intended to carry relevant information, facts as evident, and the clinical opinion of the practitioner. The medical note is intended as peer to peer communication and may appear blunt or direct. It is written in medical language and may contain abbreviations or verbiage that are unfamiliar.

## 2024-11-08 NOTE — PLAN OF CARE
Assumed care of patient at 0700  Denies pain  Culture sent from plantar foot wound  IV abx modified to Zosyn  Mg replaced per protocol  MRI complete, suspicious for acute osteomyelitis  Podiatry to see patient in afternoon  Orders to transfer patient to the floor  Report given to Eryn CHOW. All questions answered  Patient's belongings gathered and at the bedside. Patient notified of room change  Patient awaiting transport to go to new room.                   Problem: Diabetes/Glucose Control  Goal: Glucose maintained within prescribed range  Description: INTERVENTIONS:  - Monitor Blood Glucose as ordered  - Assess for signs and symptoms of hyperglycemia and hypoglycemia  - Administer ordered medications to maintain glucose within target range  - Assess barriers to adequate nutritional intake and initiate nutrition consult as needed  - Instruct patient on self management of diabetes  Outcome: Progressing     Problem: Patient/Family Goals  Goal: Patient/Family Long Term Goal  Description: Patient's Long Term Goal: Stay out of hospital    Interventions:  - Medication Compliance  -Attend follow up apts   -Diabetic F/U and care   - See additional Care Plan goals for specific interventions  Outcome: Progressing  Goal: Patient/Family Short Term Goal  Description: Patient's Short Term Goal: More comfortable, less pain    Interventions:   - PRN Pain meds   -ID to see to evaluate foot   - See additional Care Plan goals for specific interventions  Outcome: Progressing

## 2024-11-08 NOTE — PLAN OF CARE
A&Ox4. Pt denies any pain currently. Pt states that pain he was experiencing on his foot has improved since he was admitted to the floor. RA, lung sounds are clear. NSR/ST on tele, HR between 's at rest. Continent of bowel and bladder, last BM 11/6/24. Left lower extremity and foot is more swollen than right, redness along foot, open wound under the left leg and toe nail bleeding and getting ready to come off. SBA..     Bed is locked and in low position. Call light and personal items within reach.  Pt updated with plan of care     Plan of care:   IVF 100ml/hr   MRI 11/8  Problem: Diabetes/Glucose Control  Goal: Glucose maintained within prescribed range  Description: INTERVENTIONS:  - Monitor Blood Glucose as ordered  - Assess for signs and symptoms of hyperglycemia and hypoglycemia  - Administer ordered medications to maintain glucose within target range  - Assess barriers to adequate nutritional intake and initiate nutrition consult as needed  - Instruct patient on self management of diabetes  Outcome: Progressing     Problem: Patient/Family Goals  Goal: Patient/Family Long Term Goal  Description: Patient's Long Term Goal: Stay out of hospital    Interventions:  - Medication Compliance  -Attend follow up apts   -Diabetic F/U and care   - See additional Care Plan goals for specific interventions  Outcome: Progressing  Goal: Patient/Family Short Term Goal  Description: Patient's Short Term Goal: More comfortable, less pain    Interventions:   - PRN Pain meds   -ID to see to evaluate foot   - See additional Care Plan goals for specific interventions  Outcome: Progressing

## 2024-11-08 NOTE — PLAN OF CARE
Assumed care of patient at 1600  Admission Navigator complete  Denies pain  Swelling and redness to L foot and LLE. Pulses per doppler.  Consult to ID and Podiatry. Both to see patient tomorrow AM  IVF and IV abx  MRI L foot, screening form complete. Likely to be completed tomorrow AM  CGM form complete and in patient's chart  Patient currently resting in bed, call light within reach        Problem: Diabetes/Glucose Control  Goal: Glucose maintained within prescribed range  Description: INTERVENTIONS:  - Monitor Blood Glucose as ordered  - Assess for signs and symptoms of hyperglycemia and hypoglycemia  - Administer ordered medications to maintain glucose within target range  - Assess barriers to adequate nutritional intake and initiate nutrition consult as needed  - Instruct patient on self management of diabetes  Outcome: Progressing     Problem: Patient/Family Goals  Goal: Patient/Family Long Term Goal  Description: Patient's Long Term Goal:     Interventions:  -   - See additional Care Plan goals for specific interventions  Outcome: Progressing  Goal: Patient/Family Short Term Goal  Description: Patient's Short Term Goal:     Interventions:   -   - See additional Care Plan goals for specific interventions  Outcome: Progressing

## 2024-11-09 ENCOUNTER — ANESTHESIA (OUTPATIENT)
Dept: SURGERY | Facility: HOSPITAL | Age: 49
End: 2024-11-09
Payer: COMMERCIAL

## 2024-11-09 ENCOUNTER — APPOINTMENT (OUTPATIENT)
Dept: GENERAL RADIOLOGY | Facility: HOSPITAL | Age: 49
End: 2024-11-09
Attending: PODIATRIST
Payer: COMMERCIAL

## 2024-11-09 ENCOUNTER — ANESTHESIA EVENT (OUTPATIENT)
Dept: SURGERY | Facility: HOSPITAL | Age: 49
End: 2024-11-09
Payer: COMMERCIAL

## 2024-11-09 LAB
GLUCOSE BLD-MCNC: 128 MG/DL (ref 70–99)
GLUCOSE BLD-MCNC: 135 MG/DL (ref 70–99)
GLUCOSE BLD-MCNC: 256 MG/DL (ref 70–99)
GLUCOSE BLD-MCNC: 269 MG/DL (ref 70–99)
MAGNESIUM SERPL-MCNC: 1.8 MG/DL (ref 1.6–2.6)
PLATELET # BLD AUTO: 222 10(3)UL (ref 150–450)

## 2024-11-09 PROCEDURE — 28122 PARTIAL REMOVAL OF FOOT BONE: CPT | Performed by: PODIATRIST

## 2024-11-09 PROCEDURE — 0QBP0Z2 EXCISION OF LEFT METATARSAL, SESAMOID BONE(S) 1ST TOE, OPEN APPROACH: ICD-10-PCS | Performed by: PODIATRIST

## 2024-11-09 PROCEDURE — 76000 FLUOROSCOPY <1 HR PHYS/QHP: CPT | Performed by: PODIATRIST

## 2024-11-09 PROCEDURE — 0J9R0ZZ DRAINAGE OF LEFT FOOT SUBCUTANEOUS TISSUE AND FASCIA, OPEN APPROACH: ICD-10-PCS | Performed by: PODIATRIST

## 2024-11-09 PROCEDURE — 28022 EXPLORATION OF FOOT JOINT: CPT | Performed by: PODIATRIST

## 2024-11-09 PROCEDURE — 99233 SBSQ HOSP IP/OBS HIGH 50: CPT | Performed by: INTERNAL MEDICINE

## 2024-11-09 PROCEDURE — 3E0T3BZ INTRODUCTION OF ANESTHETIC AGENT INTO PERIPHERAL NERVES AND PLEXI, PERCUTANEOUS APPROACH: ICD-10-PCS | Performed by: PODIATRIST

## 2024-11-09 RX ORDER — MAGNESIUM SULFATE HEPTAHYDRATE 40 MG/ML
2 INJECTION, SOLUTION INTRAVENOUS ONCE
Status: COMPLETED | OUTPATIENT
Start: 2024-11-09 | End: 2024-11-09

## 2024-11-09 RX ORDER — HYDROMORPHONE HYDROCHLORIDE 1 MG/ML
0.6 INJECTION, SOLUTION INTRAMUSCULAR; INTRAVENOUS; SUBCUTANEOUS EVERY 5 MIN PRN
Status: DISCONTINUED | OUTPATIENT
Start: 2024-11-09 | End: 2024-11-09 | Stop reason: HOSPADM

## 2024-11-09 RX ORDER — DEXAMETHASONE SODIUM PHOSPHATE 4 MG/ML
VIAL (ML) INJECTION AS NEEDED
Status: DISCONTINUED | OUTPATIENT
Start: 2024-11-09 | End: 2024-11-09 | Stop reason: SURG

## 2024-11-09 RX ORDER — KETOROLAC TROMETHAMINE 30 MG/ML
INJECTION, SOLUTION INTRAMUSCULAR; INTRAVENOUS AS NEEDED
Status: DISCONTINUED | OUTPATIENT
Start: 2024-11-09 | End: 2024-11-09 | Stop reason: SURG

## 2024-11-09 RX ORDER — SODIUM CHLORIDE, SODIUM LACTATE, POTASSIUM CHLORIDE, CALCIUM CHLORIDE 600; 310; 30; 20 MG/100ML; MG/100ML; MG/100ML; MG/100ML
INJECTION, SOLUTION INTRAVENOUS CONTINUOUS
Status: DISCONTINUED | OUTPATIENT
Start: 2024-11-09 | End: 2024-11-09 | Stop reason: HOSPADM

## 2024-11-09 RX ORDER — HYDROMORPHONE HYDROCHLORIDE 1 MG/ML
0.4 INJECTION, SOLUTION INTRAMUSCULAR; INTRAVENOUS; SUBCUTANEOUS EVERY 5 MIN PRN
Status: DISCONTINUED | OUTPATIENT
Start: 2024-11-09 | End: 2024-11-09 | Stop reason: HOSPADM

## 2024-11-09 RX ORDER — ONDANSETRON 2 MG/ML
INJECTION INTRAMUSCULAR; INTRAVENOUS AS NEEDED
Status: DISCONTINUED | OUTPATIENT
Start: 2024-11-09 | End: 2024-11-09 | Stop reason: SURG

## 2024-11-09 RX ORDER — BUPIVACAINE HYDROCHLORIDE 5 MG/ML
INJECTION, SOLUTION EPIDURAL; INTRACAUDAL AS NEEDED
Status: DISCONTINUED | OUTPATIENT
Start: 2024-11-09 | End: 2024-11-09 | Stop reason: HOSPADM

## 2024-11-09 RX ORDER — LIDOCAINE HYDROCHLORIDE 10 MG/ML
INJECTION, SOLUTION EPIDURAL; INFILTRATION; INTRACAUDAL; PERINEURAL AS NEEDED
Status: DISCONTINUED | OUTPATIENT
Start: 2024-11-09 | End: 2024-11-09 | Stop reason: SURG

## 2024-11-09 RX ORDER — HYDROMORPHONE HYDROCHLORIDE 1 MG/ML
0.2 INJECTION, SOLUTION INTRAMUSCULAR; INTRAVENOUS; SUBCUTANEOUS EVERY 5 MIN PRN
Status: DISCONTINUED | OUTPATIENT
Start: 2024-11-09 | End: 2024-11-09 | Stop reason: HOSPADM

## 2024-11-09 RX ORDER — NALOXONE HYDROCHLORIDE 0.4 MG/ML
80 INJECTION, SOLUTION INTRAMUSCULAR; INTRAVENOUS; SUBCUTANEOUS AS NEEDED
Status: DISCONTINUED | OUTPATIENT
Start: 2024-11-09 | End: 2024-11-09 | Stop reason: HOSPADM

## 2024-11-09 RX ADMIN — DEXAMETHASONE SODIUM PHOSPHATE 4 MG: 4 MG/ML VIAL (ML) INJECTION at 10:07:00

## 2024-11-09 RX ADMIN — LIDOCAINE HYDROCHLORIDE 50 MG: 10 INJECTION, SOLUTION EPIDURAL; INFILTRATION; INTRACAUDAL; PERINEURAL at 10:07:00

## 2024-11-09 RX ADMIN — ONDANSETRON 4 MG: 2 INJECTION INTRAMUSCULAR; INTRAVENOUS at 10:30:00

## 2024-11-09 RX ADMIN — KETOROLAC TROMETHAMINE 30 MG: 30 INJECTION, SOLUTION INTRAMUSCULAR; INTRAVENOUS at 10:30:00

## 2024-11-09 RX ADMIN — SODIUM CHLORIDE: 9 INJECTION, SOLUTION INTRAVENOUS at 10:07:00

## 2024-11-09 RX ADMIN — SODIUM CHLORIDE: 9 INJECTION, SOLUTION INTRAVENOUS at 11:34:00

## 2024-11-09 NOTE — ANESTHESIA PREPROCEDURE EVALUATION
PRE-OP EVALUATION    Patient Name: Farzad Romo    Admit Diagnosis: Diabetic infection of left foot (HCC) [E11.628, L08.9]    Pre-op Diagnosis: Left foot infection [L08.9]    IRRIGATION & DEBRIDEMENT AND DRAINAGE OF LEFT FOOT ABSCESS, EXCISION OF SESAMOIDS OF LEFT FOOT, BONE BIOPSIES OF LEFT FOOT    Anesthesia Procedure: IRRIGATION & DEBRIDEMENT AND DRAINAGE OF LEFT FOOT ABSCESS, EXCISION OF SESAMOIDS OF LEFT FOOT, BONE BIOPSIES OF LEFT FOOT (Left)    Surgeons and Role:     * Sharad Arreola DPM - Primary    Pre-op vitals reviewed.  Temp: 98.5 °F (36.9 °C)  Pulse: 78  Resp: 16  BP: 152/72  SpO2: 99 %  Body mass index is 26.7 kg/m².    Current medications reviewed.  Hospital Medications:  • [COMPLETED] magnesium sulfate in sterile water for injection 2 g/50mL IVPB premix 2 g  2 g Intravenous Once   • [COMPLETED] magnesium oxide (Mag-Ox) tab 400 mg  400 mg Oral Once   • [COMPLETED] gadoterate meglumine (Dotarem) 5 MMOL/10ML injection 20 mL  20 mL Intravenous ONCE PRN   • piperacillin-tazobactam (Zosyn) 3.375 g in dextrose 5% 100 mL IVPB-ADDV  3.375 g Intravenous Q8H   • [COMPLETED] sodium chloride 0.9 % IV bolus 1,000 mL  1,000 mL Intravenous Once   • [COMPLETED] ampicillin-sulbactam (Unasyn) 3 g in sodium chloride 0.9% 100mL IVPB-ADD  3 g Intravenous Once   • [Transfer Hold] amLODIPine (Norvasc) tab 10 mg  10 mg Oral Daily   • [Transfer Hold] atorvastatin (Lipitor) tab 20 mg  20 mg Oral Nightly   • [Transfer Hold] losartan (Cozaar) tab 100 mg  100 mg Oral Daily   • [Transfer Hold] glucose (Dex4) 15 GM/59ML oral liquid 15 g  15 g Oral Q15 Min PRN    Or   • [Transfer Hold] glucose (Glutose) 40% oral gel 15 g  15 g Oral Q15 Min PRN    Or   • [Transfer Hold] glucose-vitamin C (Dex-4) chewable tab 4 tablet  4 tablet Oral Q15 Min PRN    Or   • [Transfer Hold] dextrose 50% injection 50 mL  50 mL Intravenous Q15 Min PRN    Or   • [Transfer Hold] glucose (Dex4) 15 GM/59ML oral liquid 30 g  30 g Oral Q15 Min PRN    Or   •  [Transfer Hold] glucose (Glutose) 40% oral gel 30 g  30 g Oral Q15 Min PRN    Or   • [Transfer Hold] glucose-vitamin C (Dex-4) chewable tab 8 tablet  8 tablet Oral Q15 Min PRN   • [Transfer Hold] insulin aspart (NovoLOG) 100 Units/mL FlexPen 1-10 Units  1-10 Units Subcutaneous TID AC and HS   • [Transfer Hold] insulin degludec (Tresiba) 100 units/mL flextouch 10 Units  10 Units Subcutaneous Nightly   • [Transfer Hold] melatonin tab 3 mg  3 mg Oral Nightly PRN   • [Transfer Hold] polyethylene glycol (PEG 3350) (Miralax) 17 g oral packet 17 g  17 g Oral Daily PRN   • [Transfer Hold] sennosides (Senokot) tab 17.2 mg  17.2 mg Oral Nightly PRN   • [Transfer Hold] bisacodyl (Dulcolax) 10 MG rectal suppository 10 mg  10 mg Rectal Daily PRN   • [Transfer Hold] ondansetron (Zofran) 4 MG/2ML injection 4 mg  4 mg Intravenous Q6H PRN   • [Transfer Hold] prochlorperazine (Compazine) 10 MG/2ML injection 5 mg  5 mg Intravenous Q8H PRN   • [Transfer Hold] benzonatate (Tessalon) cap 200 mg  200 mg Oral TID PRN   • [Transfer Hold] guaiFENesin (Robitussin) 100 MG/5 ML oral liquid 200 mg  200 mg Oral Q4H PRN   • [Transfer Hold] glycerin-hypromellose- (Artificial Tears) 0.2-0.2-1 % ophthalmic solution 1 drop  1 drop Both Eyes QID PRN   • [Transfer Hold] sodium chloride (Saline Mist) 0.65 % nasal solution 1 spray  1 spray Each Nare Q3H PRN   • sodium chloride 0.9% infusion   Intravenous Continuous   • [Transfer Hold] enoxaparin (Lovenox) 40 MG/0.4ML SUBQ injection 40 mg  40 mg Subcutaneous Daily   • [Transfer Hold] acetaminophen (Tylenol) tab 650 mg  650 mg Oral Q4H PRN    Or   • [Transfer Hold] HYDROcodone-acetaminophen (Norco) 5-325 MG per tab 1 tablet  1 tablet Oral Q4H PRN    Or   • [Transfer Hold] HYDROcodone-acetaminophen (Norco) 5-325 MG per tab 2 tablet  2 tablet Oral Q4H PRN   • [Transfer Hold] HYDROmorphone (Dilaudid) 1 MG/ML injection 0.2 mg  0.2 mg Intravenous Q2H PRN    Or   • [Transfer Hold] HYDROmorphone (Dilaudid)  1 MG/ML injection 0.4 mg  0.4 mg Intravenous Q2H PRN    Or   • [Transfer Hold] HYDROmorphone (Dilaudid) 1 MG/ML injection 0.8 mg  0.8 mg Intravenous Q2H PRN       Outpatient Medications:   Prescriptions Prior to Admission[1]    Allergies: Milliken, Milliken (diagnostic), and Seasonal      Anesthesia Evaluation    Patient summary reviewed.    Anesthetic Complications  (-) history of anesthetic complications         GI/Hepatic/Renal    Negative GI/hepatic/renal ROS.                             Cardiovascular  Comment: Normal sinus rhythm   Normal ECG   When compared with ECG of 15-MAY-2024 20:22,   Criteria for Anterior infarct are no longer Present   Confirmed by LAUREN KNAPP ([id number]) on 5/18/2024 8:39:10 AM       ECG reviewed.  Exercise tolerance: good     MET: >4      (+) hypertension   (+) hyperlipidemia                    (-) angina     (-) PATEL  (-) orthopnea       Endo/Other      (+) diabetes  type 2,                          Pulmonary    Negative pulmonary ROS.                       Neuro/Psych                              Osteomyelitis of left foot      Past Surgical History:   Procedure Laterality Date   • Appendectomy  10 yrs ago     Social History     Socioeconomic History   • Marital status: Single   Tobacco Use   • Smoking status: Never     Passive exposure: Never   • Smokeless tobacco: Never   Vaping Use   • Vaping status: Never Used   Substance and Sexual Activity   • Alcohol use: Not Currently     Comment: rarely   • Drug use: No   Other Topics Concern   • Caffeine Concern No     Comment: very little   • Exercise Yes     Comment: 4x per week   • Seat Belt Yes   • Special Diet No   • Stress Concern Yes   • Weight Concern No     History   Drug Use No     Available pre-op labs reviewed.  Lab Results   Component Value Date    WBC 10.1 11/08/2024    RBC 3.73 (L) 11/08/2024    HGB 10.9 (L) 11/08/2024    HCT 31.4 (L) 11/08/2024    MCV 84.2 11/08/2024    MCH 29.2 11/08/2024    MCHC 34.7 11/08/2024    RDW 12.7  11/08/2024    .0 11/09/2024     Lab Results   Component Value Date     11/08/2024    K 3.7 11/08/2024     11/08/2024    CO2 28.0 11/08/2024    BUN 9 11/08/2024    CREATSERUM 0.90 11/08/2024     (H) 11/08/2024    CA 8.6 (L) 11/08/2024     Lab Results   Component Value Date    INR 1.26 (H) 11/08/2024         Airway      Mallampati: II  Mouth opening: >3 FB  TM distance: > 6 cm  Neck ROM: full Cardiovascular    Cardiovascular exam normal.  Rhythm: regular  Rate: normal  (-) murmur   Dental    Dentition appears grossly intact         Pulmonary    Pulmonary exam normal.  Breath sounds clear to auscultation bilaterally.               Other findings        ASA: 2   Plan: general  NPO status verified and patient meets guidelines.    Post-procedure pain management plan discussed with surgeon and patient.    Comment:     Plan/risks discussed with: patient              Present on Admission:  **None**             [1]   Medications Prior to Admission   Medication Sig Dispense Refill Last Dose/Taking   • OZEMPIC, 0.25 OR 0.5 MG/DOSE, 2 MG/3ML Subcutaneous Solution Pen-injector INJECT 0.5 MG UNDER THE SKIN ONCE A WEEK 3 mL 0 Taking   • amLODIPine 5 MG Oral Tab Take 1 tablet (5 mg total) by mouth 2 (two) times daily. Ok to take 1-2 tablets if BP average is over 150/90 60 tablet 5 11/6/2024   • LOSARTAN 100 MG Oral Tab TAKE 1 TABLET(100 MG) BY MOUTH DAILY 90 tablet 0 11/6/2024   • atorvastatin 20 MG Oral Tab Take 1 tablet (20 mg total) by mouth nightly. 30 tablet 5 11/6/2024   • Glucose Blood (CONTOUR NEXT TEST) In Vitro Strip 1 each by Other route 3 (three) times daily. 100 strip 5 Taking   • BD PEN NEEDLE MINI U/F 31G X 5 MM Does not apply Misc USE WITH SEMGLEE DAILY AS DIRECTED      • FREESTYLE SUNNY 3 SENSOR Does not apply Misc 1 each every 14 (fourteen) days. 2 each 11    • Blood Pressure Monitor Does not apply Device Use to monitor blood pressure at home 1 each 0    • Blood Pressure Monitoring Does  not apply Device Monitor blood pressure daily 1 each 0    • Blood Pressure Monitoring (BLOOD PRESSURE KIT) Does not apply Device 1 each daily. 1 each 2    • Blood Glucose Monitoring Suppl (CONTOUR NEXT EZ) w/Device Does not apply Kit 1 kit by Does not apply route daily. 1 kit 0    • MICROLET LANCETS Does not apply Misc TEST THREE TIMES DAILY 300 each 0

## 2024-11-09 NOTE — CONSULTS
[facility]   part of [facility]    Report of Consultation    Farzad Romo Patient Status:  Inpatient    1975 MRN [id number]   Location [facility] 3SW-A Attending Diya Ferguson, DO   Hosp Day # 1 PCP Lloyd Guaman MD     Date of Admission:  2024  Date of Consult: 2024    Reason for Consultation:  Consulted by Dr. Love for diabetic foot infection    History of Present Illness:  Farzad Romo is a a(n) 49 year old male with DM2 with diabetic peripheral neuropathy, HTN, and HLD who is seen this evening resting comfortably in his hospital bed.  Patient has a history of a left foot ulceration, which she began noticing pain, redness, and constitutional symptoms beginning this week.  Patient reports having an ulceration to the plantar aspect of the foot for a few months, which started as a callus.  He does state that he recently was in the [place] and returned on .  He states that he has been placing coconut oil to his feet to help moisturize them.  He recently had new sketchers with memory foam and believes that he did too much walking while in the [place].  He noticed swelling and pain starting, which worsened over the course of a week in which she began having fevers and chills.  Patient did not go in the ocean and was utilizing shoes on the beach.  No other concerns.    History:  Past Medical History:    Essential hypertension    High blood pressure    Hyperlipidemia    Type II or unspecified type diabetes mellitus without mention of complication, not stated as uncontrolled    Visual impairment     Past Surgical History:   Procedure Laterality Date    Appendectomy  10 yrs ago     Family History   Problem Relation Age of Onset    Hypertension Father     Diabetes Father     Hypertension Mother     Diabetes Mother     Diabetes Sister     Diabetes Sister     Diabetes Brother     Dementia Maternal Aunt     Dementia Maternal Aunt     Dementia  Maternal Aunt       reports that he has never smoked. He has never been exposed to tobacco smoke. He has never used smokeless tobacco. He reports that he does not currently use alcohol. He reports that he does not use drugs.    Allergies:  Allergies[1]    Medications:    Current Facility-Administered Medications:     piperacillin-tazobactam (Zosyn) 3.375 g in dextrose 5% 100 mL IVPB-ADDV, 3.375 g, Intravenous, Q8H    amLODIPine (Norvasc) tab 10 mg, 10 mg, Oral, Daily    atorvastatin (Lipitor) tab 20 mg, 20 mg, Oral, Nightly    losartan (Cozaar) tab 100 mg, 100 mg, Oral, Daily    glucose (Dex4) 15 GM/59ML oral liquid 15 g, 15 g, Oral, Q15 Min PRN **OR** glucose (Glutose) 40% oral gel 15 g, 15 g, Oral, Q15 Min PRN **OR** glucose-vitamin C (Dex-4) chewable tab 4 tablet, 4 tablet, Oral, Q15 Min PRN **OR** dextrose 50% injection 50 mL, 50 mL, Intravenous, Q15 Min PRN **OR** glucose (Dex4) 15 GM/59ML oral liquid 30 g, 30 g, Oral, Q15 Min PRN **OR** glucose (Glutose) 40% oral gel 30 g, 30 g, Oral, Q15 Min PRN **OR** glucose-vitamin C (Dex-4) chewable tab 8 tablet, 8 tablet, Oral, Q15 Min PRN    insulin aspart (NovoLOG) 100 Units/mL FlexPen 1-10 Units, 1-10 Units, Subcutaneous, TID AC and HS    insulin degludec (Tresiba) 100 units/mL flextouch 10 Units, 10 Units, Subcutaneous, Nightly    melatonin tab 3 mg, 3 mg, Oral, Nightly PRN    polyethylene glycol (PEG 3350) (Miralax) 17 g oral packet 17 g, 17 g, Oral, Daily PRN    sennosides (Senokot) tab 17.2 mg, 17.2 mg, Oral, Nightly PRN    bisacodyl (Dulcolax) 10 MG rectal suppository 10 mg, 10 mg, Rectal, Daily PRN    ondansetron (Zofran) 4 MG/2ML injection 4 mg, 4 mg, Intravenous, Q6H PRN    prochlorperazine (Compazine) 10 MG/2ML injection 5 mg, 5 mg, Intravenous, Q8H PRN    benzonatate (Tessalon) cap 200 mg, 200 mg, Oral, TID PRN    guaiFENesin (Robitussin) 100 MG/5 ML oral liquid 200 mg, 200 mg, Oral, Q4H PRN    glycerin-hypromellose- (Artificial Tears) 0.2-0.2-1 %  ophthalmic solution 1 drop, 1 drop, Both Eyes, QID PRN    sodium chloride (Saline Mist) 0.65 % nasal solution 1 spray, 1 spray, Each Nare, Q3H PRN    sodium chloride 0.9% infusion, , Intravenous, Continuous    enoxaparin (Lovenox) 40 MG/0.4ML SUBQ injection 40 mg, 40 mg, Subcutaneous, Daily    acetaminophen (Tylenol) tab 650 mg, 650 mg, Oral, Q4H PRN **OR** HYDROcodone-acetaminophen (Norco) 5-325 MG per tab 1 tablet, 1 tablet, Oral, Q4H PRN **OR** HYDROcodone-acetaminophen (Norco) 5-325 MG per tab 2 tablet, 2 tablet, Oral, Q4H PRN    HYDROmorphone (Dilaudid) 1 MG/ML injection 0.2 mg, 0.2 mg, Intravenous, Q2H PRN **OR** HYDROmorphone (Dilaudid) 1 MG/ML injection 0.4 mg, 0.4 mg, Intravenous, Q2H PRN **OR** HYDROmorphone (Dilaudid) 1 MG/ML injection 0.8 mg, 0.8 mg, Intravenous, Q2H PRN    Review of Systems:  10 point ROS completed and was negative, except for pertinent positive and negatives stated in subjective.        11/8/2024     3:20 PM 11/8/2024     8:00 PM   Vitals History   /74 151/77   BP Location Right arm Right arm   Pulse 89 91   Resp 16 16   Temp 99.1 °F (37.3 °C) 99.1 °F (37.3 °C)   SpO2 98 % 98 %        Physical Exam:  GENERAL: well developed, well nourished, in no apparent distress  EXTREMITIES:   1. Integument: Full-thickness ulceration to the plantar aspect of left first metatarsal head, which probes deep.  There is active purulent drainage noted with cellulitic changes to the foot.  There is also dried sanguinous drainage coming from left hallux toenail, which is stable and intact to the nailbed.  See picture below  2. Vascular: Dorsalis pedis 2/4 and posterior tibial pulse 2/4.  Edema noted to left foot   3. Musculoskeletal: Discomfort with palpation ulceration site.   4. Neurological: Protective sensation diminished              Imaging:  MRI FOOT (W+WO), LEFT (CPT=73720)    Result Date: 11/8/2024  CONCLUSION:   1. Skin ulceration along the plantar aspect of the 1st MTP joint with moderate  regional subcutaneous edema/hyperemia most in keeping with cellulitis.  Heterogeneously enhancing phlegmon or developing abscess along the plantar aspect of the 1st MTP joint measures 2.5 x 1.1 x 2.4 cm.  2. The previously referenced phlegmon or developing abscess along the plantar aspect of the 1st MTP joint encases the hallux sesamoids with cortical irregularity and signal alteration of the distal sesamoid fragment (of the bipartite medial hallux sesamoid) suspicious for acute osteomyelitis.  3. The skin ulceration along the plantar aspect of the 1st MTP joint continues as a thin tract into the deeper soft tissues where there is an associated small effusion of the 1st MTP joint.  This could represent reactive or infected joint fluid.  4. Mild fluid distention of the flexor digitorum longus tendon sheath consistent with mild tenosynovitis, possibly infectious.    LOCATION:  [facility]   Dictated by (CST): Yobani Trinidad MD on 11/08/2024 at 10:48 AM     Finalized by (CST): Yobani Trinidad MD on 11/08/2024 at 11:11 AM       XR FOOT, COMPLETE (MIN 3 VIEWS), LEFT (CPT=73630)    Result Date: 11/7/2024  CONCLUSION:  1. Soft tissue swelling particularly adjacent to the 1st metatarsal phalangeal joint where there are few punctate lucent foci, neither an abscess nor osteomyelitis can be excluded, recommend MRI for further evaluation as clinically indicated.   LOCATION:  [facility]   Dictated by (CST): Jayne Pina MD on 11/07/2024 at 11:48 AM     Finalized by (CST): Jayne Pina MD on 11/07/2024 at 11:50 AM         Laboratory Data:  Recent Labs   Lab 11/08/24  0725   RBC 3.73*   HGB 10.9*   HCT 31.4*   MCV 84.2   MCH 29.2   MCHC 34.7   RDW 12.7   NEPRELIM 7.78*   WBC 10.1   .0         Impression and Plan:  Patient Active Problem List   Diagnosis    Pure hypercholesterolemia with target low density lipoprotein (LDL) cholesterol less than 130 mg/dL    Seasonal allergies    Uncontrolled type 2 diabetes mellitus with hyperglycemia (HCC)     Vitamin D deficiency    Polyp of colon    Diabetic infection of left foot (HCC)    Type 2 diabetes mellitus with foot ulcer, with long-term current use of insulin (HCC)    Primary hypertension    Hyperlipidemia    Abscess of left foot    Osteomyelitis of left foot (HCC)         Plan:  #Diabetic foot infection  #Diabetic ulceration to the left foot  #Left foot abscess  #Osteomyelitis of left foot    Patient seen and evaluated this evening resting comfortably in his hospital bed.  Chart history reviewed.  WBC was at 15 on admission, which has decreased to 10.1.  ESR: 65, CRP: 22.9    MRI shows phlegmon versus developing abscess along plantar first MPJ with concerns of acute osteomyelitis to the sesamoids with tracking to first MPJ consistent with possible septic joint    Discussed surgical intervention for infection control.  Recommending incision and drainage with excision of sesamoids and bone biopsies of left foot as needed.  Discussed procedure in detail with patient and all his questions and concerns were addressed.  Patient is at high risk of wound complications secondary to his ongoing diabetes with peripheral neuropathy    Wound cultures have been obtained.  Patient will continue with antibiotics per infectious disease.      We will plan for surgery tomorrow morning.  Patient will be n.p.o. at midnight.  Consent order sent.    The patient has been advised of the approximate disability involved for these procedures. In addition, the patient has been advised as to the alternatives of care, including continued conservative care as well as surgical procedures. The patient has failed all conservative treatment at this point. The patient understands that if surgical procedures are performed, there are risks and complications that could occur, including but not limited to: hematoma formation, damage to the nervous system, seroma formation, development of a DVT or phlebitis, infection, painful scar tissue formation,  stiffness, limited motion, impaired healing, increased chance of swelling, continued pain, loss of limb, loss of life, and the possibility that future surgery may need to be performed. The patient was given the opportunity to ask questions which were answered. The patient voiced no concerns and will consider all these options. Patient desires surgical intervention. No guarantees were given or implied. Pt consented freely to surgical intervention.  I spent approximately 30 minutes discussing the surgical procedures at length. I reviewed in detail the procedure to be performed, postoperative course, disability to be expected, healing time, prognosis and the importance of their following the recommended postoperative care. Possible complications discussed included but not limited to postoperative pain, swelling, stiffness, rejection of the implant if utilized, return to surgery, infection, residual pain, possible blood clot formation, bleeding, etc. Possible complications relating to over activity and limitations during the postoperative period were reviewed. The patient has responsibilities to help insure successful outcome of the surgery. Postoperative oral and written instructions were reviewed and postoperative course of treatment discussed in detail. Management of postoperative pain was discussed. All questions related to preoperative, operative and postoperative course of treatment were answered to their understanding and satisfaction. RTO and follow up after surgery is necessary and expected and agreed to by the patient. The patient acknowledged understanding of the above and agrees to follow all instructions and protocols. No guarantee or warranty was given or implied as to the results of the surgery.       Thank you for the opportunity to participate in patient's care.    Denny Arreola DPM   11/8/2024  10:31 PM         [1]   Allergies  Allergen Reactions    [place] HIVES    [place] (Diagnostic) RASH    Seasonal  Runny nose

## 2024-11-09 NOTE — BRIEF OP NOTE
Pre-Operative Diagnosis:   Diabetic foot infection, left  Diabetic foot ulcer to the level of bone with evidence of necrosis, left  Osteomyelitis of sesamoid, left foot  Left foot abscess  Septic first metatarsophalangeal joint, left foot     Post-Operative Diagnosis:   Same     Procedure Performed:   Incision and drainage of left foot abscess  Excision of tibial sesamoid, left foot    Surgeons and Role:     * Sharad Arreola DPM - Primary    Assistant(s):  None     Surgical Findings: Upon incision to left foot, roughly 3cc of purulence expressed.  Tibial sesamoid appeared dusky and necrotic and was soft in nature.  Fibular sesamoid appeared of normal morphology.  There was approximately another 2-3cc of purulence expressed from 1st metatarsophalangeal joint upon plantar capsular incision.  No further infectious drainage noted upon irrigation.     Specimen:   Left foot ulcer drainage -- micro  Left foot ulcer -- micro  Left tibial sesamoid -- micro  Left tibial sesamoid -- pathology     Estimated Blood Loss: Blood Output: 10 mL (11/9/2024 11:03 AM)    Dictation Number:  See Josh Arreola DPM  11/9/2024  11:36 AM

## 2024-11-09 NOTE — PLAN OF CARE
Pt a&O. On room air. Encouraged use of incentive spirometer and ankle pump exercises every hour while awake. Scds to BLE. Tele and  monitoring maintained. Tolerating diet with good appetite. Blood sugars monitored and insulin given. Last BM 11/8/24. Voiding w/o difficulty. Denies pain. NWB to LLE. Post op shoe at bedside. Left foot ace wrap drsg C/D/I. Elevated on pillows. Pt had surgery today. Tolerating IV atbx as ordered. Pt plans to be dc'd home when cleared. Pt understands he will most likely need IV atbx at discharge. ID following. Pt lives alone. Mg+ replaced today.

## 2024-11-09 NOTE — PROGRESS NOTES
Pt sent to OR. Consent signed and on chart. Pt requesting new IV during surgery that would not be in his AC. Note left on chart.

## 2024-11-09 NOTE — ANESTHESIA PROCEDURE NOTES
Airway  Date/Time: 11/9/2024 10:07 AM  Urgency: elective    Airway not difficult    General Information and Staff    Patient location during procedure: OR  Anesthesiologist: Perfecto Mendoza MD  Performed: anesthesiologist   Performed by: Perfecto Mendoza MD  Authorized by: Perfecto Mendoza MD      Indications and Patient Condition  Indications for airway management: anesthesia  Spontaneous ventilation: present  Sedation level: deep  Preoxygenated: yes  Patient position: sniffing  Mask difficulty assessment: 1 - vent by mask  Planned trial extubation    Final Airway Details  Final airway type: supraglottic airway      Successful airway: classic  Size 3       Number of attempts at approach: 1

## 2024-11-09 NOTE — OPERATIVE REPORT
[facility]   part of [facility]     OPERATIVE NOTE     Farzad Romo Patient Status:  Inpatient    1975 MRN [id number]   Location [facility] 3SW-A Attending Diya Ferguson,    Hosp Day # 2 PCP Lloyd Guaman MD     Date of Surgery:  2024     Preoperative Diagnosis:   Diabetic foot infection, left  Diabetic foot ulcer to the level of bone with evidence of necrosis, left  Osteomyelitis of sesamoid, left foot  Left foot abscess  Septic first metatarsophalangeal joint, left foot    Postoperative Diagnosis:   Same    Primary Surgeon: Denny Arreola DPM    Assistant: None    Procedures:   Incision and drainage of left foot abscess  Excision of tibial sesamoid, left foot    Surgical Findings: Upon incision to left foot, roughly 3cc of purulence expressed. Tibial sesamoid appeared dusky and necrotic and was soft in nature. Fibular sesamoid appeared of normal morphology. There was approximately another 2-3cc of purulence expressed from 1st metatarsophalangeal joint upon plantar capsular incision. No further infectious drainage noted upon irrigation.     Anesthesia: General    Complications: none    Estimated Blood Loss: Blood Output: 10 mL (2024 11:03 AM)      Implants: * No implants in log *    Specimen:   ID Type Source Tests Collected by Time Destination   1 : left foot tibial sesamoid Tissue Foot,left SURGICAL PATHOLOGY TISSUE Sharad Arreola DPM 2024 10:48 AM    A : left foot ulcer Abscess Foot,left AEROBIC BACTERIAL CULTURE, ANAEROBIC CULTURE Sharad Arreola DPM 2024 10:16 AM    B : left foot ulcer Tissue Foot,left ANAEROBIC CULTURE, TISSUE AEROBIC CULTURE Sharad Arreola DPM 2024 10:20 AM    C : left tibial sesamoid Tissue Foot,left ANAEROBIC CULTURE, TISSUE AEROBIC CULTURE Sharad Arreola DPM 2024 10:46 AM        Drains: None    Condition: Stable      Preoperative history/indications:  Podiatry service was consulted to see patient due to left diabetic foot  infection.  No purulent drainage coming from wound and an MRI revealed concerns of abscess to plantar first MPJ with osteomyelitis to tibial sesamoid.  Recommending incision and drainage with excision of tibial sesamoid of the left foot.  The procedure was discussed with patient in detail.  All of their questions and concerns were answered and the patient would like to proceed with surgical intervention.    Informed Consent:  All treatment options have been discussed with the patient of both conservative and surgical attempt at correction including the potential risks and complications of surgical intervention including but not exhaustive of death, loss of limb, post op pain, swelling, infection, bleeding, extended healing, and the possibility of further and future surgery.  No guarantees have been made to the pt and the informed consent has been signed.    Procedure in detail:  The patient was brought into the operating room and placed on the operating table in the supine position.  Patient had just received scheduled antibiotics prior to surgery.  A pneumatic tourniquet was placed about the patient's left.  A formal timeout was performed and the OR staff were all in agreement. Following IV sedation, the left lower extremity was then scrubbed, prepped, and draped in the usual aseptic manner.  Local anesthesia was then obtained about the left first ray utilizing 10 cc's of 0.5% marcaine plain.    Attention was directed to the plantar aspect of the left first MPJ where a linear incision was placed encompassing the full-thickness ulceration.  Upon incision, roughly 3 cc of purulence was expressed from ulceration site.  A culture swab was utilized to obtain a sample of drainage to be sent to microbiology for further evaluation.  The ulceration itself did probe down to the tibial sesamoid.  All infectious material encompassing ulceration site was sharply debrided.  Tissue from the ulceration was also sent to  microbiology for further evaluation.  The incision site was deepened down to the tibial sesamoid where it was excised under fluoroscopic guidance.  At this time, the ankle tourniquet was inflated to 250 mmHg.  Care was taken to retract and avoid damaging the flexor hallucis longus tendon.  The tendon itself appeared healthy in nature with no damage.  The tibial sesamoid was removed from the foot in its entirety and examined.  It appeared dusky and necrotic and was soft in nature.  A portion of the tibial sesamoid was sent to microbiology and the remaining tibial sesamoid was sent to pathology for further evaluation.  Next, the fibular sesamoid was evaluated and noted to appear of normal morphology and noninfected.  Attention was then directed to the plantar aspect of the first MPJ where the capsule was incised, revealing another 2-3 cc of purulence coming from the first metatarsal phalangeal joint itself.  Utilizing a [place], the joint was carefully inspected and the surrounding osseous structures and cartilage did not appear infected or damage.  The surgical site and first MPJ was then copiously irrigated with 450 mL of Irrisept.  No further infectious drainage noted.  The flexor tendon and deep tissues were then reapproximated utilizing 2-0 Vicryl and the incision was closed in layered fashion utilizing 4-0 Monocryl for subdermal closure and 2-0 nylon for skin reapproximation.    Upon completion of the procedure, the pneumatic tourniquet was deflated and a prompt hyperemic response was noted to the surgical extremity.  A postoperative block consisting of 10 cc's of 0.5% Marcaine plain was injected along the incision site.  The incision was dressed with Betadine soaked Adaptic and covered with sterile compressive dressings consisting of 4 x 4 gauze, ABD pad, Kerlix, and Ace bandage.  The pt tolerated the procedure and anesthesia well.  They were transferred to the recovery room with VSS and vascular status intact to  surgical extremity.  Following a period of postoperative monitoring, the patient will be discharged back to the hospital floor on the following written and oral postop instructions: keep dressings C/D/I, remain nonweightbearing to surgical extremity in postop shoe, ice and elevate surgical extremity when at rest.      Denny Arreoal DPM   11/9/2024

## 2024-11-09 NOTE — ANESTHESIA POSTPROCEDURE EVALUATION
[facility]    Farzad Romo Patient Status:  Inpatient   Age/Gender 49 year old male MRN [id number]   Location [facility] SURGERY Attending Diya Ferguson,    Hosp Day # 2 PCP Lloyd Guaman MD       Anesthesia Post-op Note    INCISION AND DRAINAGE LEFT FOOT ABSCESS, EXCISION OF TIBIAL  SESAMOID OF LEFT FOOT    Procedure Summary       Date: 11/09/24 Room / Location:  MAIN OR 05 / [facility] MAIN OR    Anesthesia Start: 0959 Anesthesia Stop: 1134    Procedure: INCISION AND DRAINAGE LEFT FOOT ABSCESS, EXCISION OF TIBIAL  SESAMOID OF LEFT FOOT (Left: Foot) Diagnosis:       Left foot infection      (Diabetic Foot Infection,Foot Ulcer)    Surgeons: Sharad Arreola DPM Anesthesiologist: Perfecto Mnedoza MD    Anesthesia Type: general ASA Status: 2            Anesthesia Type: general    Vitals Value Taken Time   /70 11/09/24 1135   Temp 97.8 °F (36.6 °C) 11/09/24 1135   Pulse 73 11/09/24 1135   Resp 16 11/09/24 1135   SpO2 92 % 11/09/24 1135       Patient Location: PACU    Anesthesia Type: general    Airway Patency: patent    Postop Pain Control: adequate    Mental Status: mildly sedated but able to meaningfully participate in the post-anesthesia evaluation    Nausea/Vomiting: none    Cardiopulmonary/Hydration status: stable euvolemic    Complications: no apparent anesthesia related complications    Postop vital signs: stable    Dental Exam: Unchanged from Preop    Patient to be discharged from PACU when criteria met.

## 2024-11-09 NOTE — PROGRESS NOTES
[facility]   part of [facility]     Hospitalist Progress Note     Farzad Romo Patient Status:  Inpatient    1975 MRN [id number]   Location [facility] 0-A Attending Diya Ferguson, DO   Hosp Day # 2 PCP Lloyd Guaman MD     Chief Complaint: foot swelling and pain    Subjective:     Resting in bed, awaiting surgery later this morning.. Feels foot pain is improved     Objective:    Review of Systems:   A comprehensive review of systems was completed; pertinent positive and negatives stated in subjective.    Vital signs:  Temp:  [98.6 °F (37 °C)-99.1 °F (37.3 °C)] 99 °F (37.2 °C)  Pulse:  [79-91] 79  Resp:  [16-21] 18  BP: (139-176)/(73-91) 139/80  SpO2:  [92 %-100 %] 100 %    Physical Exam:    General: No acute distress  Respiratory: No wheezes, no rhonchi  Cardiovascular: S1, S2, regular rate and rhythm  Abdomen: Soft, Non-tender, non-distended, positive bowel sounds  Neuro: No new focal deficits.   Extremities: L foot with erythema, edema    Diagnostic Data:    Labs:  Recent Labs   Lab 24  1048 24  0725 24  0452   WBC 15.3* 10.1  --    HGB 13.4 10.9*  --    MCV 85.0 84.2  --    .0 201.0 222.0   INR  --  1.26*  --        Recent Labs   Lab 24  1049 24  0725   * 153*   BUN 11 9   CREATSERUM 1.19 0.90   CA 9.7 8.6*   ALB 3.6 3.9   * 139   K 3.9 3.7    106   CO2 28.0 28.0   ALKPHO 106 85   AST 10* 11   ALT 19 10   BILT 1.0 0.7   TP 8.8* 6.9       Estimated Creatinine Clearance: 100.3 mL/min (based on SCr of 0.9 mg/dL).    No results for input(s): \"TROP\", \"TROPHS\", \"CK\" in the last 168 hours.    Recent Labs   Lab 24  0725   PTP 15.9*   INR 1.26*        Microbiology    Hospital Encounter on 24   1. Blood Culture     Status: None (Preliminary result)    Collection Time: 24 11:37 AM    Specimen: Blood,peripheral   Result Value Ref Range    Blood Culture Result No Growth 1 Day N/A         Imaging: Reviewed in  Epic.    Medications:    magnesium sulfate  2 g Intravenous Once    piperacillin-tazobactam  3.375 g Intravenous Q8H    amLODIPine  10 mg Oral Daily    atorvastatin  20 mg Oral Nightly    losartan  100 mg Oral Daily    insulin aspart  1-10 Units Subcutaneous TID AC and HS    insulin degludec  10 Units Subcutaneous Nightly    enoxaparin  40 mg Subcutaneous Daily       Assessment & Plan:      #Left Diabetic foot infection with abscess and possible tenosynovitis   #LLE cellulitis  - empiric zosyn  - follow cultures  - Podiatry/ID consulted  - XR foot cannot r/o osteomyelitis  - MRI L foot concerning for possible developing abscess and tenosynovitis   - plan for surgical intervention later this morning     # DM2  - HgbA1c 7.9%  - hyperglycemia protocol    - Degludec 10u at bedtime, ICF 1:30 > adjust PRN    # HTN  - PTA amlodipine    #HLD   - statin      Diya Ferguson,     Supplementary Documentation:     Quality:  DVT Mechanical Prophylaxis:   SCDs, Early ambuation  DVT Pharmacologic Prophylaxis   Medication    enoxaparin (Lovenox) 40 MG/0.4ML SUBQ injection 40 mg                Code Status: Not on file  Banks: No urinary catheter in place  Banks Duration (in days):   Central line:    RAGHAVENDRA:     Discharge is dependent on: clinical state  At this point Mr. Romo is expected to be discharge to: home     The 21st Century Cures Act makes medical notes like these available to patients in the interest of transparency. Please be advised this is a medical document. Medical documents are intended to carry relevant information, facts as evident, and the clinical opinion of the practitioner. The medical note is intended as peer to peer communication and may appear blunt or direct. It is written in medical language and may contain abbreviations or verbiage that are unfamiliar.

## 2024-11-09 NOTE — PROGRESS NOTES
[facility]   part of [facility] ID PROGRESS NOTE    Farzad Romo Patient Status:  Inpatient    1975 MRN [id number]   Location [facility] 0SW-A Attending Diya Ferguson, DO   Hosp Day # 2 PCP Lloyd Guaman MD     Abx: IV Zosyn D#1, s/p IV Unasyn    Subjective: Patient seen and examined today. S/p OR earlier this am. No pain at the present time. Afebrile. On room air.     Patient reports that he has a cat at home.     Allergies:  Allergies[1]    Medications:    Current Facility-Administered Medications:     piperacillin-tazobactam (Zosyn) 3.375 g in dextrose 5% 100 mL IVPB-ADDV, 3.375 g, Intravenous, Q8H    amLODIPine (Norvasc) tab 10 mg, 10 mg, Oral, Daily    atorvastatin (Lipitor) tab 20 mg, 20 mg, Oral, Nightly    losartan (Cozaar) tab 100 mg, 100 mg, Oral, Daily    glucose (Dex4) 15 GM/59ML oral liquid 15 g, 15 g, Oral, Q15 Min PRN **OR** glucose (Glutose) 40% oral gel 15 g, 15 g, Oral, Q15 Min PRN **OR** glucose-vitamin C (Dex-4) chewable tab 4 tablet, 4 tablet, Oral, Q15 Min PRN **OR** dextrose 50% injection 50 mL, 50 mL, Intravenous, Q15 Min PRN **OR** glucose (Dex4) 15 GM/59ML oral liquid 30 g, 30 g, Oral, Q15 Min PRN **OR** glucose (Glutose) 40% oral gel 30 g, 30 g, Oral, Q15 Min PRN **OR** glucose-vitamin C (Dex-4) chewable tab 8 tablet, 8 tablet, Oral, Q15 Min PRN    insulin aspart (NovoLOG) 100 Units/mL FlexPen 1-10 Units, 1-10 Units, Subcutaneous, TID AC and HS    insulin degludec (Tresiba) 100 units/mL flextouch 10 Units, 10 Units, Subcutaneous, Nightly    melatonin tab 3 mg, 3 mg, Oral, Nightly PRN    polyethylene glycol (PEG 3350) (Miralax) 17 g oral packet 17 g, 17 g, Oral, Daily PRN    sennosides (Senokot) tab 17.2 mg, 17.2 mg, Oral, Nightly PRN    bisacodyl (Dulcolax) 10 MG rectal suppository 10 mg, 10 mg, Rectal, Daily PRN    ondansetron (Zofran) 4 MG/2ML injection 4 mg, 4 mg, Intravenous, Q6H PRN    prochlorperazine (Compazine) 10 MG/2ML injection 5 mg, 5  mg, Intravenous, Q8H PRN    benzonatate (Tessalon) cap 200 mg, 200 mg, Oral, TID PRN    guaiFENesin (Robitussin) 100 MG/5 ML oral liquid 200 mg, 200 mg, Oral, Q4H PRN    glycerin-hypromellose- (Artificial Tears) 0.2-0.2-1 % ophthalmic solution 1 drop, 1 drop, Both Eyes, QID PRN    sodium chloride (Saline Mist) 0.65 % nasal solution 1 spray, 1 spray, Each Nare, Q3H PRN    sodium chloride 0.9% infusion, , Intravenous, Continuous    enoxaparin (Lovenox) 40 MG/0.4ML SUBQ injection 40 mg, 40 mg, Subcutaneous, Daily    acetaminophen (Tylenol) tab 650 mg, 650 mg, Oral, Q4H PRN **OR** HYDROcodone-acetaminophen (Norco) 5-325 MG per tab 1 tablet, 1 tablet, Oral, Q4H PRN **OR** HYDROcodone-acetaminophen (Norco) 5-325 MG per tab 2 tablet, 2 tablet, Oral, Q4H PRN    HYDROmorphone (Dilaudid) 1 MG/ML injection 0.2 mg, 0.2 mg, Intravenous, Q2H PRN **OR** HYDROmorphone (Dilaudid) 1 MG/ML injection 0.4 mg, 0.4 mg, Intravenous, Q2H PRN **OR** HYDROmorphone (Dilaudid) 1 MG/ML injection 0.8 mg, 0.8 mg, Intravenous, Q2H PRN    Review of Systems:  Completed. See pertinent positives and negatives above.     Physical Exam:  Vital signs: Blood pressure 140/81, pulse 75, temperature 97.7 °F (36.5 °C), temperature source Oral, resp. rate 16, height 177.8 cm (5' 10\"), weight 186 lb 1.1 oz (84.4 kg), SpO2 95%.    General: Alert, oriented, NAD, on room air.   HEENT: Moist mucous membranes.   Neck: No lymphadenopathy.  Supple.  Respiratory: Non-labored breathing.  Abdomen: Nondistended.   Musculoskeletal: LLE edema.   Integument: Dressing over L foot- clean/dry/intact.    Laboratory Data:  Recent Labs   Lab 11/08/24  0725 11/09/24  0452   RBC 3.73*  --    HGB 10.9*  --    HCT 31.4*  --    MCV 84.2  --    MCH 29.2  --    MCHC 34.7  --    RDW 12.7  --    NEPRELIM 7.78*  --    WBC 10.1  --    .0 222.0     Recent Labs   Lab 11/07/24  1049 11/08/24  0725   * 153*   BUN 11 9   CREATSERUM 1.19 0.90   CA 9.7 8.6*   ALB 3.6 3.9   NA  134* 139   K 3.9 3.7    106   CO2 28.0 28.0   ALKPHO 106 85   AST 10* 11   ALT 19 10   BILT 1.0 0.7   TP 8.8* 6.9       Microbiology: Reviewed in EMR    Radiology: Reviewed.    PROCEDURE:  MRI FOOT (W+WO), LEFT (CPT=73720)     LOCATION:  [place]     COMPARISON:  None.     INDICATIONS:  Ulcer to plantar surface of 1st digit.  Evaluate for infection/osteomyelitis.     TECHNIQUE:  A variety of imaging planes and parameters were utilized for visualization of suspected pathology.  Images were performed without and with intravenous gadolinium contrast.     PATIENT STATED HISTORY:(As transcribed by Technologist)  Patient presents with ulcer to the plantar surface of the first digit, pain, redness, and swelling to the left foot. Symptoms started about a week ago.      CONTRAST USED:  17 mL of Dotarem     FINDINGS:    Soft tissue ulceration noted along the plantar aspect of the 1st MTP joint.  Moderate, diffuse subcutaneous edema of the forefoot with asymmetric hyperemia along the plantar aspect of the 1st MTP joint most in keeping with cellulitis.  Heterogeneously  enhancing phlegmon or developing abscess along the plantar aspect of the 1st MTP joint measures 2.5 x 1.1 x 2.4 cm (series 15, image 23, series 13, image 13).  This encases the hallux sesamoids.       Note made of bipartite medial hallux sesamoid with marrow edema/hyperemia of the distal sesamoid fragment (series 8, image 13, series 13, image 13), additionally with prominent T1 signal hypointensity of both sesamoid fragments.  Sagittal T1 sequence  demonstrates cortical irregularity along the plantar aspect of the distal sesamoid fragment (series 10, image 21).  Imaging features raise suspicion for early changes of acute osteomyelitis involving the distal sesamoid fragment.       Minimal fluid distention of the tendon sheath of the flexor hallucis longus consistent mild tenosynovitis, possibly infectious.     The aforementioned soft tissue ulceration along  the plantar aspect of the 1st MTP joint continues as a tract into the deeper subcutaneous tissues along the plantar aspect of the 1st MTP joint.  There is a small effusion of the 1st MTP joint, perhaps with   subtle marrow edema flanking the joint.  In the absence of significant osteoarthritis, findings raise the possibility of joint infection.     Edema and atrophy of the intrinsic forefoot musculature, likely sequela of chronic diabetic neuropathy.     No fracture or malalignment of the forefoot.     Impression:    CONCLUSION:       1. Skin ulceration along the plantar aspect of the 1st MTP joint with moderate regional subcutaneous edema/hyperemia most in keeping with cellulitis.  Heterogeneously enhancing phlegmon or developing abscess along the plantar aspect of the 1st MTP joint  measures 2.5 x 1.1 x 2.4 cm.     2. The previously referenced phlegmon or developing abscess along the plantar aspect of the 1st MTP joint encases the hallux sesamoids with cortical irregularity and signal alteration of the distal sesamoid fragment (of the bipartite medial hallux  sesamoid) suspicious for acute osteomyelitis.     3. The skin ulceration along the plantar aspect of the 1st MTP joint continues as a thin tract into the deeper soft tissues where there is an associated small effusion of the 1st MTP joint.  This could represent reactive or infected joint fluid.     4. Mild fluid distention of the flexor digitorum longus tendon sheath consistent with mild tenosynovitis, possibly infectious.       LOCATION:  [facility]     Dictated by (CST): Yobani Trinidad MD on 11/08/2024 at 10:48 AM      Finalized by (CST): Yobani Trinidad MD on 11/08/2024 at 11:11 AM        PROCEDURE:  XR FOOT, COMPLETE (MIN 3 VIEWS), LEFT (CPT=73630)     TECHNIQUE:  AP, oblique, and lateral views were obtained.     COMPARISON:  None.     INDICATIONS:  left foot pain and swelling for 5 days     PATIENT STATED HISTORY: (As transcribed by Technologist)  Patient has  swelling and redness to the left foot for 5 days.  He also has an open wound on the planter side of his foot.  States he started having a swelling to the 1st digit and then it spread  to the dorsal side of the foot.      FINDINGS:    BONES:  Osseous structures are intact.  There is soft tissue swelling particularly adjacent to the 1st metatarsal phalangeal joint where there are few punctate lucent foci.  No dislocation.     Impression:    CONCLUSION:    1. Soft tissue swelling particularly adjacent to the 1st metatarsal phalangeal joint where there are few punctate lucent foci, neither an abscess nor osteomyelitis can be excluded, recommend MRI for further evaluation as clinically indicated.     LOCATION:  [facility]     Dictated by (CST): Jayne Pina MD on 11/07/2024 at 11:48 AM      Finalized by (CST): Jayne Pina MD on 11/07/2024 at 11:50 AM    ASSESSMENT:  Left diabetic foot infection with cellulitis, abscess, septic 1st MTP jt and OM  MRI c/w abscess/phlegmon along plantar aspect of 1st MTP, OM of hallux sesamoid and tenosynovitis; also with small effusion of 1st MTP joint.   Superficial culture with pasteurella and GBS  S/p I&D of left foot abscess with excision of tibial sesamoid 11/9.   Left plantar foot ulcer  Leukocytosis on admission, resolved. D/t above.   DM II, hgb A1C 7.9 (10/2024)  HTN, HLD    PLAN:  - narrow Zosyn down to Unasyn based on cx  - follow blood cultures- ngtd  - follow OR cultures and pathology  - follow temps and wbc  - anticipate will need IV abx at the time of dc    Discussed case with RN and patient.     [facility]  [phone]    ID ATTENDING ADDENDUM     Pt seen an examined independently. Chart reviewed. Agree with above. Note has been reviewed by me and modified as needed.  Exam and Impression/ Recs as noted above.  Will follow  D/w staff and with pt  More than 50% of clinical time and 100% of the clinical decision making performed  by me.    Sara Willingham MD         [1]   Allergies  Allergen Reactions    Mayking HIVES    Mayking (Diagnostic) RASH    Seasonal Runny nose

## 2024-11-09 NOTE — ANESTHESIA PROCEDURE NOTES
Peripheral IV  Date/Time: 11/9/2024 10:29 AM  Inserted by: Perfecto Mendoza MD    Placement  Needle size: 20 G  Laterality: left  Location: hand  Local anesthetic: none  Site prep: alcohol  Technique: anatomical landmarks  Attempts: 1

## 2024-11-09 NOTE — PLAN OF CARE
Alert and Oriented x4. On RA. VSS. Tele-NS. Left Foot open to air, no drainage. Denies pain at this time. Denies N/T. Voiding freely. Tolerating diet. Denies N/V. Call light within reach at this time.    Plan: NPO @ [facility] for I&D in AM w/ Dr. Arreola, Bcx/wound cx pending

## 2024-11-10 LAB
ANION GAP SERPL CALC-SCNC: 0 MMOL/L (ref 0–18)
BASOPHILS # BLD AUTO: 0.05 X10(3) UL (ref 0–0.2)
BASOPHILS NFR BLD AUTO: 0.5 %
BUN BLD-MCNC: 7 MG/DL (ref 9–23)
CALCIUM BLD-MCNC: 8.9 MG/DL (ref 8.7–10.4)
CHLORIDE SERPL-SCNC: 106 MMOL/L (ref 98–112)
CO2 SERPL-SCNC: 32 MMOL/L (ref 21–32)
CREAT BLD-MCNC: 0.91 MG/DL
EGFRCR SERPLBLD CKD-EPI 2021: 102 ML/MIN/1.73M2 (ref 60–?)
EOSINOPHIL # BLD AUTO: 0.18 X10(3) UL (ref 0–0.7)
EOSINOPHIL NFR BLD AUTO: 1.9 %
ERYTHROCYTE [DISTWIDTH] IN BLOOD BY AUTOMATED COUNT: 12.4 %
GLUCOSE BLD-MCNC: 152 MG/DL (ref 70–99)
GLUCOSE BLD-MCNC: 180 MG/DL (ref 70–99)
GLUCOSE BLD-MCNC: 194 MG/DL (ref 70–99)
GLUCOSE BLD-MCNC: 207 MG/DL (ref 70–99)
GLUCOSE BLD-MCNC: 239 MG/DL (ref 70–99)
HCT VFR BLD AUTO: 31.6 %
HGB BLD-MCNC: 11.3 G/DL
IMM GRANULOCYTES # BLD AUTO: 0.03 X10(3) UL (ref 0–1)
IMM GRANULOCYTES NFR BLD: 0.3 %
LYMPHOCYTES # BLD AUTO: 1.97 X10(3) UL (ref 1–4)
LYMPHOCYTES NFR BLD AUTO: 20.4 %
MCH RBC QN AUTO: 29.7 PG (ref 26–34)
MCHC RBC AUTO-ENTMCNC: 35.8 G/DL (ref 31–37)
MCV RBC AUTO: 82.9 FL
MONOCYTES # BLD AUTO: 0.58 X10(3) UL (ref 0.1–1)
MONOCYTES NFR BLD AUTO: 6 %
NEUTROPHILS # BLD AUTO: 6.86 X10 (3) UL (ref 1.5–7.7)
NEUTROPHILS # BLD AUTO: 6.86 X10(3) UL (ref 1.5–7.7)
NEUTROPHILS NFR BLD AUTO: 70.9 %
OSMOLALITY SERPL CALC.SUM OF ELEC: 289 MOSM/KG (ref 275–295)
PLATELET # BLD AUTO: 287 10(3)UL (ref 150–450)
POTASSIUM SERPL-SCNC: 3.7 MMOL/L (ref 3.5–5.1)
RBC # BLD AUTO: 3.81 X10(6)UL
SODIUM SERPL-SCNC: 138 MMOL/L (ref 136–145)
WBC # BLD AUTO: 9.7 X10(3) UL (ref 4–11)

## 2024-11-10 PROCEDURE — 99232 SBSQ HOSP IP/OBS MODERATE 35: CPT | Performed by: INTERNAL MEDICINE

## 2024-11-10 RX ORDER — INSULIN DEGLUDEC 100 U/ML
12 INJECTION, SOLUTION SUBCUTANEOUS NIGHTLY
Status: DISCONTINUED | OUTPATIENT
Start: 2024-11-10 | End: 2024-11-12

## 2024-11-10 RX ORDER — METRONIDAZOLE 500 MG/100ML
500 INJECTION, SOLUTION INTRAVENOUS EVERY 12 HOURS
Status: DISCONTINUED | OUTPATIENT
Start: 2024-11-10 | End: 2024-11-11

## 2024-11-10 NOTE — PROGRESS NOTES
[facility]   part of [facility]     Hospitalist Progress Note     Farzad Romo Patient Status:  Inpatient    1975 MRN [id number]   Location [facility] 0-A Attending Diya Ferguson, DO   Hosp Day # 3 PCP Lloyd Guaman MD     Chief Complaint: foot swelling and pain    Subjective:     Sitting in recliner, feeling much better, appetite is returning, foot pain well controlled     Objective:    Review of Systems:   A comprehensive review of systems was completed; pertinent positive and negatives stated in subjective.    Vital signs:  Temp:  [97.7 °F (36.5 °C)-98.5 °F (36.9 °C)] 98.3 °F (36.8 °C)  Pulse:  [70-84] 70  Resp:  [16-18] 18  BP: (127-154)/(74-84) 149/84  SpO2:  [95 %-98 %] 95 %    Physical Exam:    General: No acute distress  Respiratory: No wheezes, no rhonchi  Cardiovascular: S1, S2, regular rate and rhythm  Abdomen: Soft, Non-tender, non-distended, positive bowel sounds  Neuro: No new focal deficits.   Extremities: L foot with erythema, edema; bandage c/d/i    Diagnostic Data:    Labs:  Recent Labs   Lab 24  1048 24  0725 24  0452   WBC 15.3* 10.1  --    HGB 13.4 10.9*  --    MCV 85.0 84.2  --    .0 201.0 222.0   INR  --  1.26*  --        Recent Labs   Lab 24  1049 24  0725   * 153*   BUN 11 9   CREATSERUM 1.19 0.90   CA 9.7 8.6*   ALB 3.6 3.9   * 139   K 3.9 3.7    106   CO2 28.0 28.0   ALKPHO 106 85   AST 10* 11   ALT 19 10   BILT 1.0 0.7   TP 8.8* 6.9       Estimated Creatinine Clearance: 100.3 mL/min (based on SCr of 0.9 mg/dL).    No results for input(s): \"TROP\", \"TROPHS\", \"CK\" in the last 168 hours.    Recent Labs   Lab 24  0725   PTP 15.9*   INR 1.26*        Microbiology    Hospital Encounter on 24   1. Tissue Aerobic Culture     Status: Abnormal (Preliminary result)    Collection Time: 24 10:46 AM    Specimen: Foot,left; Tissue   Result Value Ref Range    Tissue Culture Result (A) N/A     4+  growth Streptococcus agalactiae (Group B beta strep)    Tissue Smear 1+ WBCs seen (A) N/A    Tissue Smear 1+ Gram Positive Cocci (A) N/A   2. Anaerobic Culture     Status: None (Preliminary result)    Collection Time: 11/09/24 10:46 AM    Specimen: Foot,left; Tissue   Result Value Ref Range    Anaerobic Culture Pending N/A   3. Aerobic Bacterial Culture     Status: Abnormal (Preliminary result)    Collection Time: 11/09/24 10:16 AM    Specimen: Foot,left; Abscess   Result Value Ref Range    Aerobic Culture Result 4+ growth Citrobacter koseri (A) N/A    Aerobic Culture Result (A) N/A     3+ growth Streptococcus agalactiae (Group B beta strep)    Aerobic Smear 1+ WBCs seen N/A    Aerobic Smear 1+ Gram Positive Cocci N/A   4. Blood Culture     Status: None (Preliminary result)    Collection Time: 11/07/24 11:37 AM    Specimen: Blood,peripheral   Result Value Ref Range    Blood Culture Result No Growth 2 Days N/A         Imaging: Reviewed in Epic.    Medications:    insulin degludec  12 Units Subcutaneous Nightly    ampicillin-sulbactam  3 g Intravenous q6h    amLODIPine  10 mg Oral Daily    atorvastatin  20 mg Oral Nightly    losartan  100 mg Oral Daily    insulin aspart  1-10 Units Subcutaneous TID AC and HS    enoxaparin  40 mg Subcutaneous Daily       Assessment & Plan:      #Left Diabetic foot infection with abscess and possible tenosynovitis   #Septic joint (L 1st MTP)  #LLE cellulitis  - s/p I&D of L foot abscess and tibial sesamoid exision on 11/9  - empiric zosyn  - follow cultures  - Podiatry/ID following  - will need PICC for long term IV abx     # DM2  - HgbA1c 7.9%  - hyperglycemia protocol   - increase degludec to 12u at bedtime, ICF 1:30 > adjust PRN    # HTN  - PTA amlodipine    #HLD   - statin      Diya Ferguson DO    Supplementary Documentation:     Quality:  DVT Mechanical Prophylaxis:   SCDs, Early ambuation  DVT Pharmacologic Prophylaxis   Medication    enoxaparin (Lovenox) 40 MG/0.4ML SUBQ  injection 40 mg                Code Status: Not on file  Banks: No urinary catheter in place  Banks Duration (in days):   Central line:    RAGHAVENDRA:     Discharge is dependent on: clinical state  At this point Mr. Romo is expected to be discharge to: home     The 21st Century Cures Act makes medical notes like these available to patients in the interest of transparency. Please be advised this is a medical document. Medical documents are intended to carry relevant information, facts as evident, and the clinical opinion of the practitioner. The medical note is intended as peer to peer communication and may appear blunt or direct. It is written in medical language and may contain abbreviations or verbiage that are unfamiliar.

## 2024-11-10 NOTE — PLAN OF CARE
Pt A&O. On room air. Encouraged use of incentive spirometer and ankle pump exercises every hour while awake. Scds to BLE. Tele and  monitoring maintained. Tolerating diet with good appetite. Blood sugars monitored and insulin given. Degludec insulin increased today. Last BM 11/8/24. Voiding w/o difficulty. Denies pain. NWB to LLE. Post op shoe at bedside. Pt here and saw pt today. Left foot ace wrap drsg C/D/I. Elevated on pillows. Dr Arreola here and stated either himself or his partner will change drsg tomorrow. Drsg change supplies at bedside. Tolerating IV atbx as ordered. Pt plans to be dc'd home when cleared. Pt understands he will most likely need IV atbx at discharge. ID following. Pt lives alone.

## 2024-11-10 NOTE — CM/SW NOTE
11/10/24 1600   CM/SW Referral Data   Referral Source Social Work (self-referral)   Reason for Referral Discharge planning   Informant Patient   Medical Hx   Does patient have an established PCP? Yes  (Dr. Lloyd Guaman)   Patient Info   Patient's Current Mental Status at Time of Assessment Alert;Oriented   Patient's Home Environment Condo/Apt no elevator   Number of Levels in Home 1   Number of Stair in Home 8   Patient lives with Alone   Patient Status Prior to Admission   Independent with ADLs and Mobility Yes   Discharge Needs   Anticipated D/C needs Infusion care;To be determined     SW self referred to pt's case d/t case finding and possibility of discharging w/ IV abx at discharge. SW spoke w/ pt via phone to discuss further discharge needs.     SW explained to pt that uncertainty of final orders for IVs, but wanted to explain to pt the options in preparation for this, if needed.     SW discussed daily outpatient infusion, in office teach/train and [facility]. SW informed pt that this would depend on insurance coverage and the final IV orders. Pt stated he drives and is NWB LLE, stated he could still drive to/from outpatient infusion center and open to in office teach & train if covered by insurance. Pt expressed concerns about outpatient infusion locations being too far if this is something he needs to do daily. Pt plans to take time off of work to focus on his health.     Facesheet to keep PO box, as a mailing address. Pt stated his home address is [address] [place] [id number]. If outpatient infusion is only option, pt hoping to have a nearby infusion location to his home in [place].     Awaiting further ID recommendations. Tentative referral sent to [facility] to check benefits.     Eryn Concepcion, JAVIER - [id number]

## 2024-11-10 NOTE — PROGRESS NOTES
[facility]   part of [facility] ID PROGRESS NOTE    Farzad Romo Patient Status:  Inpatient    1975 MRN [id number]   Location [facility] 0-A Attending Diya Ferguson, DO   Hosp Day # 3 PCP Lloyd Guaman MD     Abx: IV Zosyn-->  IV Unasyn--> CTX/ flagyl total d#2    Subjective: Patient seen and examined today. Feels well. Pain well controlled. No new issues    Patient reports that he has a cat at home.     Allergies:  Allergies[1]    Medications:    Current Facility-Administered Medications:     insulin degludec (Tresiba) 100 units/mL flextouch 12 Units, 12 Units, Subcutaneous, Nightly    cefTRIAXone (Rocephin) 2 g in sodium chloride 0.9% 100 mL IVPB-ADDV, 2 g, Intravenous, Q24H    metRONIDAZOLE in sodium chloride 0.79% (Flagyl) 5 mg/mL IVPB premix 500 mg, 500 mg, Intravenous, Q12H    amLODIPine (Norvasc) tab 10 mg, 10 mg, Oral, Daily    atorvastatin (Lipitor) tab 20 mg, 20 mg, Oral, Nightly    losartan (Cozaar) tab 100 mg, 100 mg, Oral, Daily    glucose (Dex4) 15 GM/59ML oral liquid 15 g, 15 g, Oral, Q15 Min PRN **OR** glucose (Glutose) 40% oral gel 15 g, 15 g, Oral, Q15 Min PRN **OR** glucose-vitamin C (Dex-4) chewable tab 4 tablet, 4 tablet, Oral, Q15 Min PRN **OR** dextrose 50% injection 50 mL, 50 mL, Intravenous, Q15 Min PRN **OR** glucose (Dex4) 15 GM/59ML oral liquid 30 g, 30 g, Oral, Q15 Min PRN **OR** glucose (Glutose) 40% oral gel 30 g, 30 g, Oral, Q15 Min PRN **OR** glucose-vitamin C (Dex-4) chewable tab 8 tablet, 8 tablet, Oral, Q15 Min PRN    insulin aspart (NovoLOG) 100 Units/mL FlexPen 1-10 Units, 1-10 Units, Subcutaneous, TID AC and HS    melatonin tab 3 mg, 3 mg, Oral, Nightly PRN    polyethylene glycol (PEG 3350) (Miralax) 17 g oral packet 17 g, 17 g, Oral, Daily PRN    sennosides (Senokot) tab 17.2 mg, 17.2 mg, Oral, Nightly PRN    bisacodyl (Dulcolax) 10 MG rectal suppository 10 mg, 10 mg, Rectal, Daily PRN    ondansetron (Zofran) 4 MG/2ML injection 4 mg,  4 mg, Intravenous, Q6H PRN    prochlorperazine (Compazine) 10 MG/2ML injection 5 mg, 5 mg, Intravenous, Q8H PRN    benzonatate (Tessalon) cap 200 mg, 200 mg, Oral, TID PRN    guaiFENesin (Robitussin) 100 MG/5 ML oral liquid 200 mg, 200 mg, Oral, Q4H PRN    glycerin-hypromellose- (Artificial Tears) 0.2-0.2-1 % ophthalmic solution 1 drop, 1 drop, Both Eyes, QID PRN    sodium chloride (Saline Mist) 0.65 % nasal solution 1 spray, 1 spray, Each Nare, Q3H PRN    enoxaparin (Lovenox) 40 MG/0.4ML SUBQ injection 40 mg, 40 mg, Subcutaneous, Daily    acetaminophen (Tylenol) tab 650 mg, 650 mg, Oral, Q4H PRN **OR** HYDROcodone-acetaminophen (Norco) 5-325 MG per tab 1 tablet, 1 tablet, Oral, Q4H PRN **OR** HYDROcodone-acetaminophen (Norco) 5-325 MG per tab 2 tablet, 2 tablet, Oral, Q4H PRN    HYDROmorphone (Dilaudid) 1 MG/ML injection 0.2 mg, 0.2 mg, Intravenous, Q2H PRN **OR** HYDROmorphone (Dilaudid) 1 MG/ML injection 0.4 mg, 0.4 mg, Intravenous, Q2H PRN **OR** HYDROmorphone (Dilaudid) 1 MG/ML injection 0.8 mg, 0.8 mg, Intravenous, Q2H PRN    Review of Systems:  Completed. See pertinent positives and negatives above.     Physical Exam:  Vital signs: Blood pressure 132/75, pulse 70, temperature 98 °F (36.7 °C), temperature source Oral, resp. rate 18, height 5' 10\" (1.778 m), weight 186 lb 1.1 oz (84.4 kg), SpO2 95%.    General: Alert, oriented, NAD, on room air.   HEENT: Moist mucous membranes.   Neck: No lymphadenopathy.  Supple.  Respiratory: Non-labored breathing.  Abdomen: Nondistended.   Musculoskeletal: LLE edema.   Integument: Dressing over L foot- clean/dry/intact.    Laboratory Data:  Recent Labs   Lab 11/10/24  1050   RBC 3.81*   HGB 11.3*   HCT 31.6*   MCV 82.9   MCH 29.7   MCHC 35.8   RDW 12.4   NEPRELIM 6.86   WBC 9.7   .0     Recent Labs   Lab 11/07/24  1049 11/08/24  0725 11/10/24  1050   * 153* 180*   BUN 11 9 7*   CREATSERUM 1.19 0.90 0.91   CA 9.7 8.6* 8.9   ALB 3.6 3.9  --    * 139  138   K 3.9 3.7 3.7    106 106   CO2 28.0 28.0 32.0   ALKPHO 106 85  --    AST 10* 11  --    ALT 19 10  --    BILT 1.0 0.7  --    TP 8.8* 6.9  --        Microbiology: Reviewed in EMR    Susceptibility data from last 90 days.  Collected Specimen Info Organism   11/09/24 Tissue from Foot,left Streptococcus agalactiae (Group B beta strep)   11/09/24 Tissue from Foot,left Citrobacter koseri     Streptococcus agalactiae (Group B beta strep)   11/09/24 Abscess from Foot,left Citrobacter koseri     Streptococcus agalactiae (Group B beta strep)   11/08/24 Other from Foot,left Streptococcus agalactiae (Group B beta strep)     Pasteurella multocida         Radiology: Reviewed.    PROCEDURE:  MRI FOOT (W+WO), LEFT (CPT=73720)     LOCATION:  [facility]     COMPARISON:  None.     INDICATIONS:  Ulcer to plantar surface of 1st digit.  Evaluate for infection/osteomyelitis.     TECHNIQUE:  A variety of imaging planes and parameters were utilized for visualization of suspected pathology.  Images were performed without and with intravenous gadolinium contrast.     PATIENT STATED HISTORY:(As transcribed by Technologist)  Patient presents with ulcer to the plantar surface of the first digit, pain, redness, and swelling to the left foot. Symptoms started about a week ago.      CONTRAST USED:  17 mL of Dotarem     FINDINGS:    Soft tissue ulceration noted along the plantar aspect of the 1st MTP joint.  Moderate, diffuse subcutaneous edema of the forefoot with asymmetric hyperemia along the plantar aspect of the 1st MTP joint most in keeping with cellulitis.  Heterogeneously  enhancing phlegmon or developing abscess along the plantar aspect of the 1st MTP joint measures 2.5 x 1.1 x 2.4 cm (series 15, image 23, series 13, image 13).  This encases the hallux sesamoids.       Note made of bipartite medial hallux sesamoid with marrow edema/hyperemia of the distal sesamoid fragment (series 8, image 13, series 13, image 13), additionally with  prominent T1 signal hypointensity of both sesamoid fragments.  Sagittal T1 sequence  demonstrates cortical irregularity along the plantar aspect of the distal sesamoid fragment (series 10, image 21).  Imaging features raise suspicion for early changes of acute osteomyelitis involving the distal sesamoid fragment.       Minimal fluid distention of the tendon sheath of the flexor hallucis longus consistent mild tenosynovitis, possibly infectious.     The aforementioned soft tissue ulceration along the plantar aspect of the 1st MTP joint continues as a tract into the deeper subcutaneous tissues along the plantar aspect of the 1st MTP joint.  There is a small effusion of the 1st MTP joint, perhaps with   subtle marrow edema flanking the joint.  In the absence of significant osteoarthritis, findings raise the possibility of joint infection.     Edema and atrophy of the intrinsic forefoot musculature, likely sequela of chronic diabetic neuropathy.     No fracture or malalignment of the forefoot.     Impression:    CONCLUSION:       1. Skin ulceration along the plantar aspect of the 1st MTP joint with moderate regional subcutaneous edema/hyperemia most in keeping with cellulitis.  Heterogeneously enhancing phlegmon or developing abscess along the plantar aspect of the 1st MTP joint  measures 2.5 x 1.1 x 2.4 cm.     2. The previously referenced phlegmon or developing abscess along the plantar aspect of the 1st MTP joint encases the hallux sesamoids with cortical irregularity and signal alteration of the distal sesamoid fragment (of the bipartite medial hallux  sesamoid) suspicious for acute osteomyelitis.     3. The skin ulceration along the plantar aspect of the 1st MTP joint continues as a thin tract into the deeper soft tissues where there is an associated small effusion of the 1st MTP joint.  This could represent reactive or infected joint fluid.     4. Mild fluid distention of the flexor digitorum longus tendon sheath  consistent with mild tenosynovitis, possibly infectious.       LOCATION:  [facility]     Dictated by (CST): Yobani Trinidad MD on 11/08/2024 at 10:48 AM      Finalized by (CST): Yobani Trinidad MD on 11/08/2024 at 11:11 AM        PROCEDURE:  XR FOOT, COMPLETE (MIN 3 VIEWS), LEFT (CPT=73630)     TECHNIQUE:  AP, oblique, and lateral views were obtained.     COMPARISON:  None.     INDICATIONS:  left foot pain and swelling for 5 days     PATIENT STATED HISTORY: (As transcribed by Technologist)  Patient has swelling and redness to the left foot for 5 days.  He also has an open wound on the planter side of his foot.  States he started having a swelling to the 1st digit and then it spread  to the dorsal side of the foot.      FINDINGS:    BONES:  Osseous structures are intact.  There is soft tissue swelling particularly adjacent to the 1st metatarsal phalangeal joint where there are few punctate lucent foci.  No dislocation.     Impression:    CONCLUSION:    1. Soft tissue swelling particularly adjacent to the 1st metatarsal phalangeal joint where there are few punctate lucent foci, neither an abscess nor osteomyelitis can be excluded, recommend MRI for further evaluation as clinically indicated.     LOCATION:  [facility]     Dictated by (CST): Jayne Pina MD on 11/07/2024 at 11:48 AM      Finalized by (CST): Jayne Pina MD on 11/07/2024 at 11:50 AM    ASSESSMENT:  Left diabetic foot infection with cellulitis, abscess, septic 1st MTP jt and OM  MRI c/w abscess/phlegmon along plantar aspect of 1st MTP, OM of hallux sesamoid and tenosynovitis; also with small effusion of 1st MTP joint.   Superficial culture with pasteurella and GBS, OR cx with citrobacter and GBS  S/p I&D of left foot abscess with excision of tibial sesamoid 11/9.   Left plantar foot ulcer  Leukocytosis on admission, resolved. D/t above.   DM II, hgb A1C 7.9 (10/2024)  HTN, HLD    PLAN:  - transition unasyn to CTX and flagyl for now while waiting for final OR cx  - follow  blood cultures- ngtd  - follow OR cultures and pathology  - follow temps and wbc  - anticipate will need IV abx at the time of dc    Discussed case with RN and patient.   Will follow    Sara Willingham MD       [1]   Allergies  Allergen Reactions    [place] HIVES    [place] (Diagnostic) RASH    Seasonal Runny nose

## 2024-11-10 NOTE — PHYSICAL THERAPY NOTE
PHYSICAL THERAPY EVALUATION - INPATIENT     Room Number: 357/357-A  Evaluation Date: 11/10/2024  Type of Evaluation: Initial  Physician Order: PT Eval and Treat    Presenting Problem: diabetic infection of L foot  Co-Morbidities : HTN, HLD, DM2  Reason for Therapy: Mobility Dysfunction and Discharge Planning    OProcedures:   Incision and drainage of left foot abscess  Excision of tibial sesamoid, left footperative report 11/9/24:    PHYSICAL THERAPY ASSESSMENT   Patient is a 49 year old male admitted 11/7/2024 for diabetic infection L foot.   Patient is currently functioning near baseline with bed mobility, transfers, gait, and stair negotiation. Prior to admission, patient's baseline is independent.     Patient will benefit from continued skilled PT Services with no additional skilled services but increased support at home.    PLAN  Patient has been evaluated and presents with no skilled Physical Therapy needs at this time.  Patient discharged from Physical Therapy services.  Please re-order if a new functional limitation presents during this admission.    PT Device Recommendation: Rolling walker    GOALS  Patient was able to achieve the following goals ...    Patient was able to transfer Safely and Mod I   Patient able to ambulate on level surfaces Safely and Mod I     HOME SITUATION  Type of Home: Apartment  Home Layout: One level  Stairs to Enter : 8   Railing: Yes              Lives With: Alone    Drives: Yes         Prior Level of Union: Pt lives in an apartment with 8 GORDO, alone, drives, is independent. Pt enjoys traveling to the  a couple times a year. Pt works full time as a .     SUBJECTIVE  Pt is pleasant and cooperative    OBJECTIVE  Precautions:  (L surgical shoe, NWB L LE)  Fall Risk: Standard fall risk    WEIGHT BEARING RESTRICTION  L Lower Extremity: Non-Weight Bearing    PAIN ASSESSMENT  Rating: Unable to rate  Location: L foot  Management Techniques:  Relaxation;Repositioning    COGNITION  Overall Cognitive Status:  WFL - within functional limits    RANGE OF MOTION AND STRENGTH ASSESSMENT  Upper extremity ROM and strength are within functional limits     Lower extremity ROM is within functional limits     Lower extremity strength is within functional limits  B LE WNL, L foot/ankle NT    BALANCE  Static Sitting: Good  Dynamic Sitting: Good  Static Standing: Poor +  Dynamic Standing: Poor +    ADDITIONAL TESTS                                    ACTIVITY TOLERANCE  Pulse: 71  Heart Rate Source: Monitor                   O2 WALK  Oxygen Therapy  SPO2% on Room Air at Rest: 100    NEUROLOGICAL FINDINGS                        AM-PAC '6-Clicks' INPATIENT SHORT FORM - BASIC MOBILITY  How much difficulty does the patient currently have...  Patient Difficulty: Turning over in bed (including adjusting bedclothes, sheets and blankets)?: None   Patient Difficulty: Sitting down on and standing up from a chair with arms (e.g., wheelchair, bedside commode, etc.): None   Patient Difficulty: Moving from lying on back to sitting on the side of the bed?: None   How much help from another person does the patient currently need...   Help from Another: Moving to and from a bed to a chair (including a wheelchair)?: None   Help from Another: Need to walk in hospital room?: None   Help from Another: Climbing 3-5 steps with a railing?: None       AM-PAC Score:  Raw Score: 24   Approx Degree of Impairment: 0%   Standardized Score (AM-PAC Scale): 61.14   CMS Modifier (G-Code): CH    FUNCTIONAL ABILITY STATUS  Gait Assessment   Functional Mobility/Gait Assessment  Gait Assistance: Modified independent  Distance (ft): 100  Assistive Device: Rolling walker  Pattern:  (hop to gait-NWB L LE)  Stairs: Stairs  How Many Stairs: 4  Device: 2 Rails  Assist: Modified independent  Pattern: Ascend and Descend  Ascend and Descend : Step to    Skilled Therapy Provided     Bed Mobility:  Rolling: NT  Supine  to sit: Mod I   Sit to supine: NT     Transfer Mobility:  Sit to stand: Mod I   Stand to sit: Mod I  Gait = Mod I with RW    Therapist's comments:PT orders received, chart reviewed, and RN approved PT session. Pt lying in bed and agreeable to PT. Vitals assessed and WNL. Pt educated and instructed in NWB L LE, instructed in eliud/doff surgical shoe. Pt instructed in proper hand placement and integration of RW with transfers and ambulation. Pt cued for upright posture in standing and during ambulation and to keep RW within MACHELLE. Instructed in NWB L LE and hop to gait with RW. Pt then instructed in stair negotiation with hop to gait, maintaining NWB L LE. Pt returned to room. Increased time spent on education and discussion with pt regarding the importance of safety awareness, fall precautions, activity level and pacing. Also discussed home DME to include the RW, knee scooter. Pt in understanding. Pt left in chair, all needs in reach. RN notified of session.     Exercise/Education Provided:  Body mechanics  Don/Doff ortho/prosthesis  Energy conservation  Functional activity tolerated  Gait training  Transfer training    Patient End of Session: Up in chair;With  staff;Needs met;Call light within reach;RN aware of session/findings;Bracing education provided per handout;All patient questions and concerns addressed;Hospital anti-slip socks;Alarm set;Family present    Patient Evaluation Complexity Level:  History Moderate - 1 or 2 personal factors and/or co-morbidities   Examination of body systems Low -  addressing 1-2 elements   Clinical Presentation Low- Stable   Clinical Decision Making Low Complexity       PT Session Time: 35 minutes  Gait Training: 15 minutes  Therapeutic Activity: 10 minutes  Neuromuscular Re-education: 0 minutes  Therapeutic Exercise: 0 minutes

## 2024-11-10 NOTE — PLAN OF CARE
On call podiatry paged regarding resumption of lovenox tonight vs tomorrow morning. Awaiting call back.  Per md ok to resume lovenox tonight as ordered.    On call hospitalist updated regarding pt missed his bp meds this morning d/t npo for surg he had today. Bp now at 150/82, pt requesting a dose of amlodipine & losartan at this time. Awaiting call back. Ok to give at this time, medication retimed/ rescheduled for 2200, ok per hospitalist.    Pt tolerating diet. Foot elevated on pillows, able to feel touch to toes & wiggle them, toes warm to touch. Ace wrap clean dry and intact. Pt has hx tingling & decreased sensation to BLE. Voiding in urinal, maintaining nwb to LLE.  Vss at this time. On ra. Iv abx given as ordered. Call light withtin reach. Pt verbalized understanding of poc & call dont fall protocol. Plan awaiting ctx.

## 2024-11-10 NOTE — PROGRESS NOTES
[facility]   part of [facility]    PODIATRY PROGRESS NOTE    Farzad Romo Patient Status:  Inpatient    1975 MRN [id number]   Location [facility] 3SW-A Attending Diya Ferguson,    Hosp Day # 3 PCP Lloyd Guaman MD     Date of Admission:  2024    History of Present Illness:  POD #1 status post incision and drainage of left foot abscess with excision of tibial sesamoid of the left foot.  Patient states he is doing well.  He is resting in his hospital chair.  His dressings are clean, dry, and intact and he has a postop shoe on.  Denying any pain.  No other concerns.    History:  Past Medical History:    Essential hypertension    High blood pressure    Hyperlipidemia    Type II or unspecified type diabetes mellitus without mention of complication, not stated as uncontrolled    Visual impairment     Past Surgical History:   Procedure Laterality Date    Appendectomy  10 yrs ago     Family History   Problem Relation Age of Onset    Hypertension Father     Diabetes Father     Hypertension Mother     Diabetes Mother     Diabetes Sister     Diabetes Sister     Diabetes Brother     Dementia Maternal Aunt     Dementia Maternal Aunt     Dementia Maternal Aunt       reports that he has never smoked. He has never been exposed to tobacco smoke. He has never used smokeless tobacco. He reports that he does not currently use alcohol. He reports that he does not use drugs.    Allergies:  Allergies[1]    Medications:    Current Facility-Administered Medications:     insulin degludec (Tresiba) 100 units/mL flextouch 12 Units, 12 Units, Subcutaneous, Nightly    ampicillin-sulbactam (Unasyn) 3 g in sodium chloride 0.9% 100mL IVPB-ADD, 3 g, Intravenous, q6h    amLODIPine (Norvasc) tab 10 mg, 10 mg, Oral, Daily    atorvastatin (Lipitor) tab 20 mg, 20 mg, Oral, Nightly    losartan (Cozaar) tab 100 mg, 100 mg, Oral, Daily    glucose (Dex4) 15 GM/59ML oral liquid 15 g, 15 g, Oral, Q15 Min PRN **OR**  glucose (Glutose) 40% oral gel 15 g, 15 g, Oral, Q15 Min PRN **OR** glucose-vitamin C (Dex-4) chewable tab 4 tablet, 4 tablet, Oral, Q15 Min PRN **OR** dextrose 50% injection 50 mL, 50 mL, Intravenous, Q15 Min PRN **OR** glucose (Dex4) 15 GM/59ML oral liquid 30 g, 30 g, Oral, Q15 Min PRN **OR** glucose (Glutose) 40% oral gel 30 g, 30 g, Oral, Q15 Min PRN **OR** glucose-vitamin C (Dex-4) chewable tab 8 tablet, 8 tablet, Oral, Q15 Min PRN    insulin aspart (NovoLOG) 100 Units/mL FlexPen 1-10 Units, 1-10 Units, Subcutaneous, TID AC and HS    melatonin tab 3 mg, 3 mg, Oral, Nightly PRN    polyethylene glycol (PEG 3350) (Miralax) 17 g oral packet 17 g, 17 g, Oral, Daily PRN    sennosides (Senokot) tab 17.2 mg, 17.2 mg, Oral, Nightly PRN    bisacodyl (Dulcolax) 10 MG rectal suppository 10 mg, 10 mg, Rectal, Daily PRN    ondansetron (Zofran) 4 MG/2ML injection 4 mg, 4 mg, Intravenous, Q6H PRN    prochlorperazine (Compazine) 10 MG/2ML injection 5 mg, 5 mg, Intravenous, Q8H PRN    benzonatate (Tessalon) cap 200 mg, 200 mg, Oral, TID PRN    guaiFENesin (Robitussin) 100 MG/5 ML oral liquid 200 mg, 200 mg, Oral, Q4H PRN    glycerin-hypromellose- (Artificial Tears) 0.2-0.2-1 % ophthalmic solution 1 drop, 1 drop, Both Eyes, QID PRN    sodium chloride (Saline Mist) 0.65 % nasal solution 1 spray, 1 spray, Each Nare, Q3H PRN    enoxaparin (Lovenox) 40 MG/0.4ML SUBQ injection 40 mg, 40 mg, Subcutaneous, Daily    acetaminophen (Tylenol) tab 650 mg, 650 mg, Oral, Q4H PRN **OR** HYDROcodone-acetaminophen (Norco) 5-325 MG per tab 1 tablet, 1 tablet, Oral, Q4H PRN **OR** HYDROcodone-acetaminophen (Norco) 5-325 MG per tab 2 tablet, 2 tablet, Oral, Q4H PRN    HYDROmorphone (Dilaudid) 1 MG/ML injection 0.2 mg, 0.2 mg, Intravenous, Q2H PRN **OR** HYDROmorphone (Dilaudid) 1 MG/ML injection 0.4 mg, 0.4 mg, Intravenous, Q2H PRN **OR** HYDROmorphone (Dilaudid) 1 MG/ML injection 0.8 mg, 0.8 mg, Intravenous, Q2H PRN    Review of Systems:  10  point ROS completed and was negative, except for pertinent positive and negatives stated in subjective.    Physical Exam:    Dressings left intact today.  No current strikethrough noted    Imaging:  XR FLUOROSCOPY C-ARM TIME LESS THAN 1 HOUR (CPT=76000)    Result Date: 11/9/2024  CONCLUSION:   Multiple fluoroscopic images document incision and drainage procedure involving the soft tissues about the 1st MTP joint.  Please refer to dedicated procedure report for full detail.   LOCATION:  [facility]    Dictated by (CST): Yobani Trinidad MD on 11/09/2024 at 12:21 PM     Finalized by (CST): Yobani Trinidad MD on 11/09/2024 at 12:23 PM       MRI FOOT (W+WO), LEFT (CPT=73720)    Result Date: 11/8/2024  CONCLUSION:   1. Skin ulceration along the plantar aspect of the 1st MTP joint with moderate regional subcutaneous edema/hyperemia most in keeping with cellulitis.  Heterogeneously enhancing phlegmon or developing abscess along the plantar aspect of the 1st MTP joint measures 2.5 x 1.1 x 2.4 cm.  2. The previously referenced phlegmon or developing abscess along the plantar aspect of the 1st MTP joint encases the hallux sesamoids with cortical irregularity and signal alteration of the distal sesamoid fragment (of the bipartite medial hallux sesamoid) suspicious for acute osteomyelitis.  3. The skin ulceration along the plantar aspect of the 1st MTP joint continues as a thin tract into the deeper soft tissues where there is an associated small effusion of the 1st MTP joint.  This could represent reactive or infected joint fluid.  4. Mild fluid distention of the flexor digitorum longus tendon sheath consistent with mild tenosynovitis, possibly infectious.    LOCATION:  [facility]   Dictated by (CST): Yobani Trinidad MD on 11/08/2024 at 10:48 AM     Finalized by (CST): Yobani Trinidad MD on 11/08/2024 at 11:11 AM         Laboratory Data:  Recent Labs   Lab 11/10/24  1050   RBC 3.81*   HGB 11.3*   HCT 31.6*   MCV 82.9   MCH 29.7   MCHC 35.8   RDW 12.4    NEPRELIM 6.86   WBC 9.7   .0       Impression:  Patient Active Problem List   Diagnosis    Pure hypercholesterolemia with target low density lipoprotein (LDL) cholesterol less than 130 mg/dL    Seasonal allergies    Uncontrolled type 2 diabetes mellitus with hyperglycemia (HCC)    Vitamin D deficiency    Polyp of colon    Diabetic infection of left foot (HCC)    Type 2 diabetes mellitus with foot ulcer, with long-term current use of insulin (HCC)    Primary hypertension    Hyperlipidemia    Abscess of left foot    Osteomyelitis of left foot (HCC)         Plan:  POD #1 status post incision and drainage of left foot abscess with excision of tibial sesamoid left foot    Patient doing well.  Pain well-controlled.  Dressings are clean, dry, intact.    Will plan to change dressings tomorrow.    Patient should remain nonweightbearing to his left foot at this time    Patient will continue antibiotics per infectious disease.  Surgical cultures pending.  Patient may need outpatient antibiotic coverage due to extent of infection that was within the first MPJ    Podiatry will continue to follow    Denny Arreola DPM   11/10/2024  12:13 PM         [1]   Allergies  Allergen Reactions    [place] HIVES    [place] (Diagnostic) RASH    Seasonal Runny nose

## 2024-11-11 ENCOUNTER — TELEPHONE (OUTPATIENT)
Dept: PODIATRY CLINIC | Facility: CLINIC | Age: 49
End: 2024-11-11

## 2024-11-11 ENCOUNTER — APPOINTMENT (OUTPATIENT)
Facility: HOSPITAL | Age: 49
End: 2024-11-11
Attending: PHYSICIAN ASSISTANT
Payer: COMMERCIAL

## 2024-11-11 LAB
GLUCOSE BLD-MCNC: 152 MG/DL (ref 70–99)
GLUCOSE BLD-MCNC: 156 MG/DL (ref 70–99)
GLUCOSE BLD-MCNC: 169 MG/DL (ref 70–99)
GLUCOSE BLD-MCNC: 183 MG/DL (ref 70–99)

## 2024-11-11 PROCEDURE — 02HV33Z INSERTION OF INFUSION DEVICE INTO SUPERIOR VENA CAVA, PERCUTANEOUS APPROACH: ICD-10-PCS | Performed by: INTERNAL MEDICINE

## 2024-11-11 PROCEDURE — 99024 POSTOP FOLLOW-UP VISIT: CPT

## 2024-11-11 PROCEDURE — 99232 SBSQ HOSP IP/OBS MODERATE 35: CPT | Performed by: INTERNAL MEDICINE

## 2024-11-11 RX ORDER — METRONIDAZOLE 500 MG/1
500 TABLET ORAL 2 TIMES DAILY WITH MEALS
Qty: 28 TABLET | Refills: 0 | Status: SHIPPED | OUTPATIENT
Start: 2024-11-11 | End: 2024-11-12

## 2024-11-11 RX ORDER — LIDOCAINE HYDROCHLORIDE 10 MG/ML
5 INJECTION, SOLUTION EPIDURAL; INFILTRATION; INTRACAUDAL; PERINEURAL
Status: COMPLETED | OUTPATIENT
Start: 2024-11-11 | End: 2024-11-11

## 2024-11-11 RX ORDER — CEFTRIAXONE SODIUM 2 G/50ML
2 INJECTION, SOLUTION INTRAVENOUS EVERY 24 HOURS
Qty: 2050 ML | Refills: 0 | Status: SHIPPED | OUTPATIENT
Start: 2024-11-11 | End: 2024-12-22

## 2024-11-11 RX ORDER — METRONIDAZOLE 500 MG/1
500 TABLET ORAL 2 TIMES DAILY WITH MEALS
Status: DISCONTINUED | OUTPATIENT
Start: 2024-11-11 | End: 2024-11-12

## 2024-11-11 NOTE — PLAN OF CARE
Patient A&O X4 on RA. VSS, /IS. SCDs, Lovenox. Voiding freely via urinal, LBM 11/08- passing gas. On IV Rocephin Q24h and IV Flagyl Q12h. Surgical incision to left foot covered with ACE, c/d/i. HX neuropathy. Pain controlled with PO medication. NWB to left foot with post-op shoe. PT/OT. Up min assist w/ walker. Reminded to use call light. Plan to dc home when cleared.

## 2024-11-11 NOTE — DISCHARGE INSTRUCTIONS
Sometimes managing your health at home requires assistance.  The [facility] team has recognized your preference to use [facility].  They can be reached by phone at [phone].  The fax number for your reference is [phone].  A representative from the home health agency will contact you or your family to schedule your first visit.      It was recommended that you have IV antibiotics at home to facilitate your recovery and compliment your treatment.  The [facility] team has set up delivery with [facility] [phone].  The estimated delivery is TBD.    Leave foot dressing in place until follow up visit    If you experienced any difficulties with the delivery, please contact the [facility] department at [phone].    PICC ORDERS:   0.9% NS flush 10 ml as needed every 7 days when not in use.  0.9% NS flush 20 ml after blood draws.  0.9% NS flush 10 ml after each use.  Dressing changes: Transparent dressing without gauze every 7 days or as needed, if soiled, wet, or non-occlusive.   Apply heat to affective area as needed for discomfort.  MICHAELA Willingham

## 2024-11-11 NOTE — PROGRESS NOTES
[facility]   part of [facility] ID PROGRESS NOTE    Farzad Romo Patient Status:  Inpatient    1975 MRN [id number]   Location [facility] 0-A Attending Diya Ferguson, DO   Hosp Day # 4 PCP Lloyd Guaman MD     Abx: IV Zosyn-->  IV Unasyn--> CTX/ flagyl total d#3    Subjective: Patient seen and examined today. No pain at the present time. LLE leg swelling improving. Afebrile.     Allergies:  Allergies[1]    Medications:    Current Facility-Administered Medications:     insulin degludec (Tresiba) 100 units/mL flextouch 12 Units, 12 Units, Subcutaneous, Nightly    cefTRIAXone (Rocephin) 2 g in sodium chloride 0.9% 100 mL IVPB-ADDV, 2 g, Intravenous, Q24H    metRONIDAZOLE in sodium chloride 0.79% (Flagyl) 5 mg/mL IVPB premix 500 mg, 500 mg, Intravenous, Q12H    amLODIPine (Norvasc) tab 10 mg, 10 mg, Oral, Daily    atorvastatin (Lipitor) tab 20 mg, 20 mg, Oral, Nightly    losartan (Cozaar) tab 100 mg, 100 mg, Oral, Daily    glucose (Dex4) 15 GM/59ML oral liquid 15 g, 15 g, Oral, Q15 Min PRN **OR** glucose (Glutose) 40% oral gel 15 g, 15 g, Oral, Q15 Min PRN **OR** glucose-vitamin C (Dex-4) chewable tab 4 tablet, 4 tablet, Oral, Q15 Min PRN **OR** dextrose 50% injection 50 mL, 50 mL, Intravenous, Q15 Min PRN **OR** glucose (Dex4) 15 GM/59ML oral liquid 30 g, 30 g, Oral, Q15 Min PRN **OR** glucose (Glutose) 40% oral gel 30 g, 30 g, Oral, Q15 Min PRN **OR** glucose-vitamin C (Dex-4) chewable tab 8 tablet, 8 tablet, Oral, Q15 Min PRN    insulin aspart (NovoLOG) 100 Units/mL FlexPen 1-10 Units, 1-10 Units, Subcutaneous, TID AC and HS    melatonin tab 3 mg, 3 mg, Oral, Nightly PRN    polyethylene glycol (PEG 3350) (Miralax) 17 g oral packet 17 g, 17 g, Oral, Daily PRN    sennosides (Senokot) tab 17.2 mg, 17.2 mg, Oral, Nightly PRN    bisacodyl (Dulcolax) 10 MG rectal suppository 10 mg, 10 mg, Rectal, Daily PRN    ondansetron (Zofran) 4 MG/2ML injection 4 mg, 4 mg, Intravenous, Q6H  PRN    prochlorperazine (Compazine) 10 MG/2ML injection 5 mg, 5 mg, Intravenous, Q8H PRN    benzonatate (Tessalon) cap 200 mg, 200 mg, Oral, TID PRN    guaiFENesin (Robitussin) 100 MG/5 ML oral liquid 200 mg, 200 mg, Oral, Q4H PRN    glycerin-hypromellose- (Artificial Tears) 0.2-0.2-1 % ophthalmic solution 1 drop, 1 drop, Both Eyes, QID PRN    sodium chloride (Saline Mist) 0.65 % nasal solution 1 spray, 1 spray, Each Nare, Q3H PRN    enoxaparin (Lovenox) 40 MG/0.4ML SUBQ injection 40 mg, 40 mg, Subcutaneous, Daily    acetaminophen (Tylenol) tab 650 mg, 650 mg, Oral, Q4H PRN **OR** HYDROcodone-acetaminophen (Norco) 5-325 MG per tab 1 tablet, 1 tablet, Oral, Q4H PRN **OR** HYDROcodone-acetaminophen (Norco) 5-325 MG per tab 2 tablet, 2 tablet, Oral, Q4H PRN    HYDROmorphone (Dilaudid) 1 MG/ML injection 0.2 mg, 0.2 mg, Intravenous, Q2H PRN **OR** HYDROmorphone (Dilaudid) 1 MG/ML injection 0.4 mg, 0.4 mg, Intravenous, Q2H PRN **OR** HYDROmorphone (Dilaudid) 1 MG/ML injection 0.8 mg, 0.8 mg, Intravenous, Q2H PRN    Review of Systems:  Completed. See pertinent positives and negatives above.     Physical Exam:  Vital signs: Blood pressure 150/78, pulse 68, temperature 97.6 °F (36.4 °C), temperature source Oral, resp. rate 18, height 177.8 cm (5' 10\"), weight 186 lb 1.1 oz (84.4 kg), SpO2 97%.    General: Alert, oriented, NAD, on room air.   HEENT: Moist mucous membranes.   Neck: No lymphadenopathy.  Supple.  Respiratory: Non-labored breathing. Clear to auscultation bilaterally. No wheezing.   Abdomen: Nondistended.   Musculoskeletal: LLE edema improving  Integument: Dressing over L foot- clean/dry/intact.    Laboratory Data:  Recent Labs   Lab 11/10/24  1050   RBC 3.81*   HGB 11.3*   HCT 31.6*   MCV 82.9   MCH 29.7   MCHC 35.8   RDW 12.4   NEPRELIM 6.86   WBC 9.7   .0     Recent Labs   Lab 11/07/24  1049 11/08/24  0725 11/10/24  1050   * 153* 180*   BUN 11 9 7*   CREATSERUM 1.19 0.90 0.91   CA 9.7 8.6*  8.9   ALB 3.6 3.9  --    * 139 138   K 3.9 3.7 3.7    106 106   CO2 28.0 28.0 32.0   ALKPHO 106 85  --    AST 10* 11  --    ALT 19 10  --    BILT 1.0 0.7  --    TP 8.8* 6.9  --        Microbiology: Reviewed in EMR    Susceptibility data from last 90 days.  Collected Specimen Info Organism Cefazolin Ciprofloxacin Gentamicin Levofloxacin Meropenem Piperacillin/Tazobactam Trimethoprim/Sulfamethoxazole   11/09/24 Tissue from Foot,left Streptococcus agalactiae (Group B beta strep)            Pasteurella multocida          11/09/24 Tissue from Foot,left Citrobacter koseri  S  S  S  S  S  S  S     Streptococcus agalactiae (Group B beta strep)          11/09/24 Abscess from Foot,left Citrobacter koseri            Streptococcus agalactiae (Group B beta strep)          11/08/24 Other from Foot,left Streptococcus agalactiae (Group B beta strep)            Pasteurella multocida                Radiology: Reviewed.    PROCEDURE:  MRI FOOT (W+WO), LEFT (CPT=73720)     LOCATION:  [place]     COMPARISON:  None.     INDICATIONS:  Ulcer to plantar surface of 1st digit.  Evaluate for infection/osteomyelitis.     TECHNIQUE:  A variety of imaging planes and parameters were utilized for visualization of suspected pathology.  Images were performed without and with intravenous gadolinium contrast.     PATIENT STATED HISTORY:(As transcribed by Technologist)  Patient presents with ulcer to the plantar surface of the first digit, pain, redness, and swelling to the left foot. Symptoms started about a week ago.      CONTRAST USED:  17 mL of Dotarem     FINDINGS:    Soft tissue ulceration noted along the plantar aspect of the 1st MTP joint.  Moderate, diffuse subcutaneous edema of the forefoot with asymmetric hyperemia along the plantar aspect of the 1st MTP joint most in keeping with cellulitis.  Heterogeneously  enhancing phlegmon or developing abscess along the plantar aspect of the 1st MTP joint measures 2.5 x 1.1 x 2.4 cm (series  15, image 23, series 13, image 13).  This encases the hallux sesamoids.       Note made of bipartite medial hallux sesamoid with marrow edema/hyperemia of the distal sesamoid fragment (series 8, image 13, series 13, image 13), additionally with prominent T1 signal hypointensity of both sesamoid fragments.  Sagittal T1 sequence  demonstrates cortical irregularity along the plantar aspect of the distal sesamoid fragment (series 10, image 21).  Imaging features raise suspicion for early changes of acute osteomyelitis involving the distal sesamoid fragment.       Minimal fluid distention of the tendon sheath of the flexor hallucis longus consistent mild tenosynovitis, possibly infectious.     The aforementioned soft tissue ulceration along the plantar aspect of the 1st MTP joint continues as a tract into the deeper subcutaneous tissues along the plantar aspect of the 1st MTP joint.  There is a small effusion of the 1st MTP joint, perhaps with   subtle marrow edema flanking the joint.  In the absence of significant osteoarthritis, findings raise the possibility of joint infection.     Edema and atrophy of the intrinsic forefoot musculature, likely sequela of chronic diabetic neuropathy.     No fracture or malalignment of the forefoot.     Impression:    CONCLUSION:       1. Skin ulceration along the plantar aspect of the 1st MTP joint with moderate regional subcutaneous edema/hyperemia most in keeping with cellulitis.  Heterogeneously enhancing phlegmon or developing abscess along the plantar aspect of the 1st MTP joint  measures 2.5 x 1.1 x 2.4 cm.     2. The previously referenced phlegmon or developing abscess along the plantar aspect of the 1st MTP joint encases the hallux sesamoids with cortical irregularity and signal alteration of the distal sesamoid fragment (of the bipartite medial hallux  sesamoid) suspicious for acute osteomyelitis.     3. The skin ulceration along the plantar aspect of the 1st MTP joint  continues as a thin tract into the deeper soft tissues where there is an associated small effusion of the 1st MTP joint.  This could represent reactive or infected joint fluid.     4. Mild fluid distention of the flexor digitorum longus tendon sheath consistent with mild tenosynovitis, possibly infectious.       LOCATION:  [facility]     Dictated by (CST): Yobani Trinidad MD on 11/08/2024 at 10:48 AM      Finalized by (CST): Yobani Trinidad MD on 11/08/2024 at 11:11 AM        PROCEDURE:  XR FOOT, COMPLETE (MIN 3 VIEWS), LEFT (CPT=73630)     TECHNIQUE:  AP, oblique, and lateral views were obtained.     COMPARISON:  None.     INDICATIONS:  left foot pain and swelling for 5 days     PATIENT STATED HISTORY: (As transcribed by Technologist)  Patient has swelling and redness to the left foot for 5 days.  He also has an open wound on the planter side of his foot.  States he started having a swelling to the 1st digit and then it spread  to the dorsal side of the foot.      FINDINGS:    BONES:  Osseous structures are intact.  There is soft tissue swelling particularly adjacent to the 1st metatarsal phalangeal joint where there are few punctate lucent foci.  No dislocation.     Impression:    CONCLUSION:    1. Soft tissue swelling particularly adjacent to the 1st metatarsal phalangeal joint where there are few punctate lucent foci, neither an abscess nor osteomyelitis can be excluded, recommend MRI for further evaluation as clinically indicated.     LOCATION:  [facility]     Dictated by ([facility]): Jayne Pina MD on 11/07/2024 at 11:48 AM      Finalized by (CST): Jayne Pina MD on 11/07/2024 at 11:50 AM    ASSESSMENT:  Left diabetic foot infection with cellulitis, abscess, septic 1st MTP jt and OM  MRI c/w abscess/phlegmon along plantar aspect of 1st MTP, OM of hallux sesamoid and tenosynovitis; also with small effusion of 1st MTP joint.   Superficial culture with pasteurella and GBS.   S/p I&D of left foot abscess with excision of tibial sesamoid  11/9, cxs with citrobacter, GBS and pasterurella.   Left plantar foot ulcer  Leukocytosis on admission, resolved. D/t above.   DM II, hgb A1C 7.9 (10/2024)  HTN, HLD    PLAN:  - continue IV CTX and flagyl  - follow blood cultures- ngtd  - anaerobic OR culture pending; pathology pending  - follow temps and wbc  - podiatry eval appreciated, noted plans for dressing change later today  - will place PICC and plan for prolonged course of IV abx on dc given extent of the infection.     Discussed case with RN, Dr. Willingham and patient.   Will follow    Sally Marin PA-C    ID ATTENDING ADDENDUM     Pt seen an examined independently. Chart reviewed. Agree with above. Note has been reviewed by me and modified as needed.  Exam and Impression/ Recs as noted above.  Will follow  D/w staff and with pt  More than 50% of clinical time and 100% of the clinical decision making performed by me.    Sara Willingham MD         [1]   Allergies  Allergen Reactions    [place] HIVES    [place] (Diagnostic) RASH    Seasonal Runny nose

## 2024-11-11 NOTE — PROGRESS NOTES
[facility]   part of [facility]    PODIATRY PROGRESS NOTE    Farzad Romo Patient Status:  Inpatient    1975 MRN [id number]   Location [facility] 3SW-A Attending Diya Ferguson, DO   Hosp Day # 4 PCP Lloyd Guaman MD     Date of Admission:  2024    History of Present Illness:  Farzad Romo is a a(n) 49 year old male who is being seen today as a as a podiatry post-op patient. Pt is feeling well today, denies any pain. Pt is resting in hospital bed with Left foot elevated. Dressings are clean, dry, intact.  Pt is non-weight bearing. States that he is hopping on his right foot using a walker, and plans to use rolling knee scooter when he is discharged home.       History:  Past Medical History:    Essential hypertension    High blood pressure    Hyperlipidemia    Type II or unspecified type diabetes mellitus without mention of complication, not stated as uncontrolled    Visual impairment     Past Surgical History:   Procedure Laterality Date    Appendectomy  10 yrs ago     Family History   Problem Relation Age of Onset    Hypertension Father     Diabetes Father     Hypertension Mother     Diabetes Mother     Diabetes Sister     Diabetes Sister     Diabetes Brother     Dementia Maternal Aunt     Dementia Maternal Aunt     Dementia Maternal Aunt       reports that he has never smoked. He has never been exposed to tobacco smoke. He has never used smokeless tobacco. He reports that he does not currently use alcohol. He reports that he does not use drugs.    Allergies:  Allergies[1]    Medications:    Current Facility-Administered Medications:     insulin degludec (Tresiba) 100 units/mL flextouch 12 Units, 12 Units, Subcutaneous, Nightly    cefTRIAXone (Rocephin) 2 g in sodium chloride 0.9% 100 mL IVPB-ADDV, 2 g, Intravenous, Q24H    metRONIDAZOLE in sodium chloride 0.79% (Flagyl) 5 mg/mL IVPB premix 500 mg, 500 mg, Intravenous, Q12H    amLODIPine (Norvasc) tab 10 mg, 10 mg, Oral,  Daily    atorvastatin (Lipitor) tab 20 mg, 20 mg, Oral, Nightly    losartan (Cozaar) tab 100 mg, 100 mg, Oral, Daily    glucose (Dex4) 15 GM/59ML oral liquid 15 g, 15 g, Oral, Q15 Min PRN **OR** glucose (Glutose) 40% oral gel 15 g, 15 g, Oral, Q15 Min PRN **OR** glucose-vitamin C (Dex-4) chewable tab 4 tablet, 4 tablet, Oral, Q15 Min PRN **OR** dextrose 50% injection 50 mL, 50 mL, Intravenous, Q15 Min PRN **OR** glucose (Dex4) 15 GM/59ML oral liquid 30 g, 30 g, Oral, Q15 Min PRN **OR** glucose (Glutose) 40% oral gel 30 g, 30 g, Oral, Q15 Min PRN **OR** glucose-vitamin C (Dex-4) chewable tab 8 tablet, 8 tablet, Oral, Q15 Min PRN    insulin aspart (NovoLOG) 100 Units/mL FlexPen 1-10 Units, 1-10 Units, Subcutaneous, TID AC and HS    melatonin tab 3 mg, 3 mg, Oral, Nightly PRN    polyethylene glycol (PEG 3350) (Miralax) 17 g oral packet 17 g, 17 g, Oral, Daily PRN    sennosides (Senokot) tab 17.2 mg, 17.2 mg, Oral, Nightly PRN    bisacodyl (Dulcolax) 10 MG rectal suppository 10 mg, 10 mg, Rectal, Daily PRN    ondansetron (Zofran) 4 MG/2ML injection 4 mg, 4 mg, Intravenous, Q6H PRN    prochlorperazine (Compazine) 10 MG/2ML injection 5 mg, 5 mg, Intravenous, Q8H PRN    benzonatate (Tessalon) cap 200 mg, 200 mg, Oral, TID PRN    guaiFENesin (Robitussin) 100 MG/5 ML oral liquid 200 mg, 200 mg, Oral, Q4H PRN    glycerin-hypromellose- (Artificial Tears) 0.2-0.2-1 % ophthalmic solution 1 drop, 1 drop, Both Eyes, QID PRN    sodium chloride (Saline Mist) 0.65 % nasal solution 1 spray, 1 spray, Each Nare, Q3H PRN    enoxaparin (Lovenox) 40 MG/0.4ML SUBQ injection 40 mg, 40 mg, Subcutaneous, Daily    acetaminophen (Tylenol) tab 650 mg, 650 mg, Oral, Q4H PRN **OR** HYDROcodone-acetaminophen (Norco) 5-325 MG per tab 1 tablet, 1 tablet, Oral, Q4H PRN **OR** HYDROcodone-acetaminophen (Norco) 5-325 MG per tab 2 tablet, 2 tablet, Oral, Q4H PRN    HYDROmorphone (Dilaudid) 1 MG/ML injection 0.2 mg, 0.2 mg, Intravenous, Q2H PRN  **OR** HYDROmorphone (Dilaudid) 1 MG/ML injection 0.4 mg, 0.4 mg, Intravenous, Q2H PRN **OR** HYDROmorphone (Dilaudid) 1 MG/ML injection 0.8 mg, 0.8 mg, Intravenous, Q2H PRN    Review of Systems:  10 point ROS completed and was negative, except for pertinent positive and negatives stated in subjective.      Physical Exam:  GENERAL: well developed, well nourished, in no apparent distress  EXTREMITIES:             1. Integument: Normal skin temperature and turgor; Dressings were removed; Wound is healing with no signs of infection, sutures are intact, incision is well-approximated.  2. Vascular: Dorsalis pedis 2/4 bilateral and posterior tibial pulses 2/4 bilateral, capillary refill normal.   3. Musculoskeletal: All muscle groups are graded 5/5 in the foot and ankle.   4. Neurological: Normal sharp dull sensation; gross sensation intact via light touch bilaterally.    Imaging:  XR FLUOROSCOPY C-ARM TIME LESS THAN 1 HOUR (CPT=76000)    Result Date: 11/9/2024  CONCLUSION:   Multiple fluoroscopic images document incision and drainage procedure involving the soft tissues about the 1st MTP joint.  Please refer to dedicated procedure report for full detail.   LOCATION:  [facility]    Dictated by (CST): Yobani Trinidad MD on 11/09/2024 at 12:21 PM     Finalized by (CST): Yobani Trinidad MD on 11/09/2024 at 12:23 PM         Laboratory Data:  Recent Labs   Lab 11/10/24  1050   RBC 3.81*   HGB 11.3*   HCT 31.6*   MCV 82.9   MCH 29.7   MCHC 35.8   RDW 12.4   NEPRELIM 6.86   WBC 9.7   .0       Assessment and Plan:  POD #1 status post incision and drainage of left foot abscess with excision of tibial sesamoid left foot     -Chart history reviewed  -Patient doing well with pain well-controlled.  -Intra op micro shows 4+ group B strep, 3+ pasteurella multocida, path pending. ID following--noted plans for PICC. Appreciate ID eval.  - Dressing changed: Incision painted with betadine, bulky dressing applied and wrapped with bulky Kerlix and  Ace wrap. Please leave intact until follow up visit in podiatry office.  -Patient should remain non-weightbearing to his left foot      From podiatry standpoint, patient is stable for discharge when all services sign off. Our office will call for appt.     Thank you for the opportunity to participate in patient's care.      CONNOR Campos   11/11/2024  10:36 AM  Is this a shared or split note between Advanced Practice Provider and Physician? No   Supplementary Documentation:   DVT Mechanical Prophylaxis:   SCDs, Early ambuation  DVT Pharmacologic Prophylaxis   Medication    enoxaparin (Lovenox) 40 MG/0.4ML SUBQ injection 40 mg                Code Status: Not on file  Banks: No urinary catheter in place  Banks Duration (in days):   Central line:    RAGHAVENDRA: 11/12/2024                          [1]   Allergies  Allergen Reactions    [place] HIVES    [place] (Diagnostic) RASH    Seasonal Runny nose

## 2024-11-11 NOTE — CM/SW NOTE
Met with pt at bedside to discuss DC Planning for IV abx.  Pt prefers DC plan for home with [facility] and home infusion.  He lives alone but feels able to manage IV abx independently.  Pt will not be able to return to work at DC.  He has 8 steps to enter his condo and has been instructed to limit mobility to facilitate wound healing.      Referral sent to HH providers via [facility].  Spoke with Rhonda from [facility] who stated they will need to request PCP referral/auth once final order is available.  Updates sent via [facility].  / to remain available for support and/or discharge planning.     Nabila Jc, [facility]  Discharge Planner  [phone]

## 2024-11-11 NOTE — TELEPHONE ENCOUNTER
Called patient and scheduled PO appointment on 11/19 at 130pm with Dr Arreola. Patient is aware of address.

## 2024-11-11 NOTE — DIETARY NOTE
[facility]   part of [facility]   CLINICAL NUTRITION    Farzad Pérezell     Admitting diagnosis:  Diabetic infection of left foot (HCC) [E11.628, L08.9]    Ht: 177.8 cm (5' 10\")  Wt: 84.4 kg (186 lb 1.1 oz).   Body mass index is 26.7 kg/m².  IBW: 75.5 kg    Wt Readings from Last 6 Encounters:   11/07/24 84.4 kg (186 lb 1.1 oz)   11/07/24 84.4 kg (186 lb)   10/18/24 85.3 kg (188 lb)   07/02/24 88.9 kg (196 lb)   06/07/24 (P) 89.4 kg (197 lb)   05/20/24 88.9 kg (196 lb)        Labs/Meds reviewed  -A1c:7.9 (10/11/24), POC Glu:152-256, CRP:22.90 (11/7)  -Insulin, IV abx, Miralax    Diet:       Procedures    Carbohydrate controlled diet 1800 kcal/60 grams; Is Patient on Accuchecks? Yes     Percent Meals Eaten (last 3 days)       Date/Time Percent Meals Eaten (%)    11/08/24 0820 100 %    11/09/24 1350 100 %    11/09/24 1835 100 %    11/09/24 2000 100 %    11/10/24 1133 100 %    11/10/24 1547 100 %    11/10/24 2030 100 %    11/11/24 1100 100 %          Pt chart reviewed d/t + wound. Found to have L DM foot infection/ulcer, L foot abscess and septic first metatarsophalangeal joint.   S/p I&D of L foot abscess and excision of L tibial sesamoid on 11/9.    Pt noted decreased appetite starting on Wed 11/6, which pt contributed d/t chills and dizziness from foot infection. Denied any N/V/D/C. Last BM:11/8.  Pt stated appetite is improving since being admitted.   Nursing notes reports Percent Meals Eaten (%): 100 % intake for last meal.  Discussed importance of blood sugar control and consuming protein foods w/ each meal to aid w/ wound healing. Provided handouts on Tips for Increasing Protein and CHO Counting for People with DM.   Pt agreeable to try fruit punch Celso BID; at breakfast and dinner.     Pt stated [facility]. Uses a CGM, tries to eat healthier, and limit sweets. Pt stated his MD transitioned him off insulin and Metformin. Experiencing diarrhea w/ Metformin.   Has been  taking Ozempic for a couple months. Pt noted his A1c decreased from ~11.0 to 7.9 (10/11/24).    -Per EMR hx, pt has lost about 10 lb (5.1%) in about 4 months, which is not significant wt loss per standards.    -Pt stated for a while he weighed 178 lb and was having a hard time gaining wt.    PMH:HTN, HLD, DM2.  FOOD ALLERGIES: almonds    Patient is at low nutrition risk at this time.    Please consult if patient status changes or nutrition issues arise.    Vivien García MS, RD, LDN  Clinical Dietitian  Ext:[phone]

## 2024-11-11 NOTE — CM/SW NOTE
11/11/24 161   Choice of Post-Acute Provider   Informed patient of right to choose their preferred provider Yes   List of appropriate post-acute services provided to patient/family with quality data Yes   [facility]family choice [facility]   Information given to Patient   [facility] financial disclosure given Yes       Met with pt and provided [facility] information for accepting HH agencies.  [facility] chosen.  Final orders received for IV ceftriaxone and sent to [facility] via [facility].  Spoke with Rhonda from [facility] who confirmed they will obtain approvals from insurance/PCP.  Anticipate DC tomorrow once insurance approvals in place.  / to remain available for support and/or discharge planning.     Nabila Jc, [facility]  Discharge Planner  [phone]

## 2024-11-11 NOTE — PROGRESS NOTES
[facility]   part of [facility]     Hospitalist Progress Note     Farzaddaniela Romo Patient Status:  Inpatient    1975 MRN [id number]   Location [facility] 0-A Attending Diya Ferguson, DO   Hosp Day # 4 PCP Lloyd Guaman MD     Chief Complaint: foot swelling and pain    Subjective:     Feeling well and in good spirits. Foot pain/swelling improving     Objective:    Review of Systems:   A comprehensive review of systems was completed; pertinent positive and negatives stated in subjective.    Vital signs:  Temp:  [98 °F (36.7 °C)-98.4 °F (36.9 °C)] 98.4 °F (36.9 °C)  Pulse:  [67-76] 67  Resp:  [18-20] 19  BP: (120-154)/(69-84) 134/76  SpO2:  [96 %-98 %] 96 %    Physical Exam:    General: No acute distress  Respiratory: No wheezes, no rhonchi  Cardiovascular: S1, S2, regular rate and rhythm  Abdomen: Soft, Non-tender, non-distended, positive bowel sounds  Neuro: No new focal deficits.   Extremities: L foot with erythema, edema; bandage c/d/i    Diagnostic Data:    Labs:  Recent Labs   Lab 24  1048 24  0725 24  0452 11/10/24  1050   WBC 15.3* 10.1  --  9.7   HGB 13.4 10.9*  --  11.3*   MCV 85.0 84.2  --  82.9   .0 201.0 222.0 287.0   INR  --  1.26*  --   --        Recent Labs   Lab 24  1049 24  0725 11/10/24  1050   * 153* 180*   BUN 11 9 7*   CREATSERUM 1.19 0.90 0.91   CA 9.7 8.6* 8.9   ALB 3.6 3.9  --    * 139 138   K 3.9 3.7 3.7    106 106   CO2 28.0 28.0 32.0   ALKPHO 106 85  --    AST 10* 11  --    ALT 19 10  --    BILT 1.0 0.7  --    TP 8.8* 6.9  --        Estimated Creatinine Clearance: 99.2 mL/min (based on SCr of 0.91 mg/dL).    No results for input(s): \"TROP\", \"TROPHS\", \"CK\" in the last 168 hours.    Recent Labs   Lab 24  0725   PTP 15.9*   INR 1.26*        Microbiology    Hospital Encounter on 24   1. Tissue Aerobic Culture     Status: Abnormal (Preliminary result)    Collection Time: 24 10:46 AM     Specimen: Foot,left; Tissue   Result Value Ref Range    Tissue Culture Result (A) N/A     4+ growth Streptococcus agalactiae (Group B beta strep)    Tissue Smear 1+ WBCs seen (A) N/A    Tissue Smear 1+ Gram Positive Cocci (A) N/A   2. Anaerobic Culture     Status: None (Preliminary result)    Collection Time: 11/09/24 10:46 AM    Specimen: Foot,left; Tissue   Result Value Ref Range    Anaerobic Culture Pending N/A   3. Aerobic Bacterial Culture     Status: Abnormal    Collection Time: 11/09/24 10:16 AM    Specimen: Foot,left; Abscess   Result Value Ref Range    Aerobic Culture Result 4+ growth Citrobacter koseri (A) N/A    Aerobic Culture Result (A) N/A     3+ growth Streptococcus agalactiae (Group B beta strep)    Aerobic Smear 1+ WBCs seen N/A    Aerobic Smear 1+ Gram Positive Cocci N/A   4. Blood Culture     Status: None (Preliminary result)    Collection Time: 11/07/24 11:37 AM    Specimen: Blood,peripheral   Result Value Ref Range    Blood Culture Result No Growth 3 Days N/A         Imaging: Reviewed in Epic.    Medications:    insulin degludec  12 Units Subcutaneous Nightly    cefTRIAXone  2 g Intravenous Q24H    metRONIDAZOLE  500 mg Intravenous Q12H    amLODIPine  10 mg Oral Daily    atorvastatin  20 mg Oral Nightly    losartan  100 mg Oral Daily    insulin aspart  1-10 Units Subcutaneous TID AC and HS    enoxaparin  40 mg Subcutaneous Daily       Assessment & Plan:      #Left Diabetic foot infection with abscess and possible tenosynovitis   #Septic joint (L 1st MTP)  #LLE cellulitis  - s/p I&D of L foot abscess and tibial sesamoid exision on 11/9  - empiric ceftriaxone/flagyl  - cultures with pasturella and GBS  - Podiatry/ID following  - will need PICC for long term IV abx  - discharge to home once IV abx arranged     # DM2  - HgbA1c 7.9%  - hyperglycemia protocol   - degludec 12u at bedtime, ICF 1:30 > adjust PRN    # HTN  - PTA amlodipine    #HLD   - statin      Diya Ferguson,     Supplementary  Documentation:     Quality:  DVT Mechanical Prophylaxis:   SCDs, Early ambuation  DVT Pharmacologic Prophylaxis   Medication    enoxaparin (Lovenox) 40 MG/0.4ML SUBQ injection 40 mg                Code Status: Not on file  Banks: No urinary catheter in place  Banks Duration (in days):   Central line:    RAGHAVENDRA: 11/12/2024    Discharge is dependent on: clinical state  At this point Mr. Romo is expected to be discharge to: home     The 21st Century Cures Act makes medical notes like these available to patients in the interest of transparency. Please be advised this is a medical document. Medical documents are intended to carry relevant information, facts as evident, and the clinical opinion of the practitioner. The medical note is intended as peer to peer communication and may appear blunt or direct. It is written in medical language and may contain abbreviations or verbiage that are unfamiliar.

## 2024-11-11 NOTE — PLAN OF CARE
Pt A&O. On room air. Encouraged use of incentive spirometer and ankle pump exercises every hour while awake. Scds to BLE. Tele and  monitoring maintained. Tolerating diet with good appetite. Blood sugars monitored and insulin given. Had small BM today. Miralax given this AM. Voiding w/o difficulty. Denies pain. NWB to LLE. Post op shoe at bedside. Up with standby assist using walker and gait belt. Left foot ace wrap drsg C/D/I. Elevated on pillows. Drsg changed by Dr Fish today. Tolerating IV atbx as ordered. Pt plans to be dc'd home with [facility] when cleared, most likely tomorrow. PICC placed today. Pt lives alone.

## 2024-11-12 VITALS
OXYGEN SATURATION: 96 % | DIASTOLIC BLOOD PRESSURE: 74 MMHG | SYSTOLIC BLOOD PRESSURE: 148 MMHG | HEIGHT: 70 IN | RESPIRATION RATE: 18 BRPM | WEIGHT: 186.06 LBS | HEART RATE: 72 BPM | TEMPERATURE: 99 F | BODY MASS INDEX: 26.64 KG/M2

## 2024-11-12 DIAGNOSIS — E11.65 UNCONTROLLED TYPE 2 DIABETES MELLITUS WITH HYPERGLYCEMIA (HCC): ICD-10-CM

## 2024-11-12 LAB
GLUCOSE BLD-MCNC: 117 MG/DL (ref 70–99)
GLUCOSE BLD-MCNC: 184 MG/DL (ref 70–99)

## 2024-11-12 PROCEDURE — 99239 HOSP IP/OBS DSCHRG MGMT >30: CPT | Performed by: INTERNAL MEDICINE

## 2024-11-12 RX ORDER — METRONIDAZOLE 500 MG/1
500 TABLET ORAL 2 TIMES DAILY WITH MEALS
Qty: 56 TABLET | Refills: 0 | Status: SHIPPED | OUTPATIENT
Start: 2024-11-12 | End: 2024-12-10

## 2024-11-12 NOTE — CM/SW NOTE
Received call from Gaby with [facility] who confirmed they have received insurance approval.  They will deliver meds/supplies to pt's home tomorrow AM.  [facility]  RN to see pt for start of care tomorrow afternoon.  Updated RN and [facility].  No further needs at this time.  / to remain available for support and/or discharge planning.     Nabila Jc, [facility]  Discharge Planner  [phone]

## 2024-11-12 NOTE — HOME CARE LIAISON
[facility] referral [facility]. Spoke w/ patient who is agreeable with [facility]. Contact information placed on AVS.

## 2024-11-12 NOTE — CM/SW NOTE
Spoke with Gaby from [facility].  Pt is medically ready for discharge pending insurance approvals for home IV abx.  She will confirm once approvals in place.  Hopeful for DC later today after 330pm dose of rocephin.  / to remain available for support and/or discharge planning.     Nabila Jc, [facility]  Discharge Planner  [phone]    Addendum:  Spoke with Gaby - insurance approval still pending.

## 2024-11-12 NOTE — PROGRESS NOTES
[facility]   part of [facility]     Hospitalist Progress Note     Farzad Romo Patient Status:  Inpatient    1975 MRN [id number]   Location [facility] 0-A Attending Diya Ferguson, DO   Hosp Day # 5 PCP Lloyd Guaman MD     Chief Complaint: foot swelling and pain    Subjective:     Resting in chair, no complaints . Hoping to go home today. Encouraged close OP follow up and importance of glycemic control for wound healing    Objective:    Review of Systems:   A comprehensive review of systems was completed; pertinent positive and negatives stated in subjective.    Vital signs:  Temp:  [98 °F (36.7 °C)-98.5 °F (36.9 °C)] 98.5 °F (36.9 °C)  Pulse:  [68-72] 72  Resp:  [18-20] 18  BP: (131-148)/(74-80) 148/74  SpO2:  [96 %-97 %] 96 %    Physical Exam:    General: No acute distress  Respiratory: No wheezes, no rhonchi  Cardiovascular: S1, S2, regular rate and rhythm  Abdomen: Soft, Non-tender, non-distended, positive bowel sounds  Neuro: No new focal deficits.   Extremities: L foot with erythema, edema; bandage c/d/i    Diagnostic Data:    Labs:  Recent Labs   Lab 24  1048 24  0725 24  0452 11/10/24  1050   WBC 15.3* 10.1  --  9.7   HGB 13.4 10.9*  --  11.3*   MCV 85.0 84.2  --  82.9   .0 201.0 222.0 287.0   INR  --  1.26*  --   --        Recent Labs   Lab 24  1049 24  0725 11/10/24  1050   * 153* 180*   BUN 11 9 7*   CREATSERUM 1.19 0.90 0.91   CA 9.7 8.6* 8.9   ALB 3.6 3.9  --    * 139 138   K 3.9 3.7 3.7    106 106   CO2 28.0 28.0 32.0   ALKPHO 106 85  --    AST 10* 11  --    ALT 19 10  --    BILT 1.0 0.7  --    TP 8.8* 6.9  --        Estimated Creatinine Clearance: 99.2 mL/min (based on SCr of 0.91 mg/dL).    No results for input(s): \"TROP\", \"TROPHS\", \"CK\" in the last 168 hours.    Recent Labs   Lab 24  0725   PTP 15.9*   INR 1.26*        Microbiology    Hospital Encounter on 24   1. Tissue Aerobic Culture     Status:  Abnormal    Collection Time: 11/09/24 10:46 AM    Specimen: Foot,left; Tissue   Result Value Ref Range    Tissue Culture Result (A) N/A     4+ growth Streptococcus agalactiae (Group B beta strep)    Tissue Culture Result 3+ growth Pasteurella multocida (A) N/A    Tissue Smear 1+ WBCs seen (A) N/A    Tissue Smear 1+ Gram Positive Cocci (A) N/A   2. Anaerobic Culture     Status: Abnormal    Collection Time: 11/09/24 10:46 AM    Specimen: Foot,left; Tissue   Result Value Ref Range    Anaerobic Culture 3+ growth Bacteroides fragilis (A) N/A    Anaerobic Culture 3+ growth Prevotella species (A) N/A   3. Aerobic Bacterial Culture     Status: Abnormal    Collection Time: 11/09/24 10:16 AM    Specimen: Foot,left; Abscess   Result Value Ref Range    Aerobic Culture Result 4+ growth Citrobacter koseri (A) N/A    Aerobic Culture Result (A) N/A     3+ growth Streptococcus agalactiae (Group B beta strep)    Aerobic Smear 1+ WBCs seen N/A    Aerobic Smear 1+ Gram Positive Cocci N/A   4. Blood Culture     Status: None (Preliminary result)    Collection Time: 11/07/24 11:37 AM    Specimen: Blood,peripheral   Result Value Ref Range    Blood Culture Result No Growth 4 Days N/A         Imaging: Reviewed in Epic.    Medications:    metRONIDAZOLE  500 mg Oral BID with meals    insulin degludec  12 Units Subcutaneous Nightly    cefTRIAXone  2 g Intravenous Q24H    amLODIPine  10 mg Oral Daily    atorvastatin  20 mg Oral Nightly    losartan  100 mg Oral Daily    insulin aspart  1-10 Units Subcutaneous TID AC and HS    enoxaparin  40 mg Subcutaneous Daily       Assessment & Plan:      #Left Diabetic foot infection with abscess and possible tenosynovitis   #Septic joint (L 1st MTP)  #LLE cellulitis  - s/p I&D of L foot abscess and tibial sesamoid exision on 11/9  - empiric ceftriaxone/flagyl  - cultures with pasturella and GBS  - Podiatry/ID following  - PICC placed 11/11  - discharge to home once IV abx arranged     # DM2  - HgbA1c 7.9%  -  hyperglycemia protocol   - degludec 12u at bedtime, ICF 1:30 > adjust PRN    # HTN  - PTA amlodipine    #HLD   - statin      Diya Ferguson, DO    Supplementary Documentation:     Quality:  DVT Mechanical Prophylaxis:   SCDs, Early ambuation  DVT Pharmacologic Prophylaxis   Medication    enoxaparin (Lovenox) 40 MG/0.4ML SUBQ injection 40 mg                Code Status: Not on file  Banks: No urinary catheter in place  Banks Duration (in days):   Central line:    RAGHAVENDRA: 11/12/2024    Discharge is dependent on: clinical state  At this point Mr. Romo is expected to be discharge to: home     The 21st Century Cures Act makes medical notes like these available to patients in the interest of transparency. Please be advised this is a medical document. Medical documents are intended to carry relevant information, facts as evident, and the clinical opinion of the practitioner. The medical note is intended as peer to peer communication and may appear blunt or direct. It is written in medical language and may contain abbreviations or verbiage that are unfamiliar.

## 2024-11-12 NOTE — DISCHARGE SUMMARY
[facility] HOSPITALIST  DISCHARGE SUMMARY     Farzad Romo Patient Status:  Inpatient    1975 MRN [id number]   Location [facility] 3SW-A Attending Eduardo Carbone,    Hosp Day # 5 PCP Lloyd Guaman MD     Date of Admission: 2024  Date of Discharge:  2024   Discharge Disposition: Home or Self Care    Discharge Diagnosis:  #Left Diabetic foot infection with abscess and possible tenosynovitis   #Septic joint (L 1st MTP)  #LLE cellulitis   #DM2  #HTN  #HLD    History of Present Illness: (per Dr. Carbone), Farzad Romo is a 49 year old male with PMHx HTN, HLD, DM2 who presents for foot infection. Patient notes that he was in the [place] approximately 1 week ago, had bought new shoes that he thought was memory foam however may have been tighter than usual.  States that he was walking quite a bit when he was there, notes that left foot was getting more swollen, erythematous, painful.  States that he did have ventral small ulceration prior to this however now has been noticing erythema on the dorsal aspect of the foot and great toe.  Notes associated chills, lightheadedness prior to admission.  Denies any other chest pain, shortness of breath, abdominal pain, nausea, vomiting    Brief Synopsis: admitted with L diabetic foot ulcer/cellulitis. ID and podiatry consulted. MRI was concerning for abscess and possible tenosynovitis. He underwent  I&D of L foot abscess and tibial sesamoid exision on . His abx were tailored to culture results. PICC was placed and pt was discharged to home on course of IV abx with plan to f/u closely with podiatry, ID, and PCP in outpt setting.     Lace+ Score: 63  59-90 High Risk  29-58 Medium Risk  0-28   Low Risk  Patient was referred to the [facility].    TCM Follow-Up Recommendation:  LACE > 58: High Risk of readmission after discharge from the hospital.      Procedures during hospitalization:   I&D of L foot abscess and tibial  sesamoid exision on 11/9.    Incidental or significant findings and recommendations (brief descriptions):  As above    Lab/Test results pending at Discharge:   none    Consultants:  Podiatry  ID    Discharge Medication List:     Discharge Medications        START taking these medications        Instructions Prescription details   cefTRIAXone in dextrose 5% IVPB premix  Commonly known as: Rocephin      Inject 50 mL (2 g total) into the vein daily. Will need weekly labs with CBC, BMP, ESR and CRP while on this medication.   Stop taking on: December 22, 2024  Quantity: 2050 mL  Refills: 0     metRONIDAZOLE 500 MG Tabs  Commonly known as: Flagyl      Take 1 tablet (500 mg total) by mouth 2 (two) times daily with meals for 28 days.   Stop taking on: December 10, 2024  Quantity: 56 tablet  Refills: 0            CONTINUE taking these medications        Instructions Prescription details   amLODIPine 5 MG Tabs  Commonly known as: Norvasc      Take 1 tablet (5 mg total) by mouth 2 (two) times daily. Ok to take 1-2 tablets if BP average is over 150/90   Quantity: 60 tablet  Refills: 5     BD Pen Needle Mini U/F 31G X 5 MM Misc  Generic drug: Insulin Pen Needle      USE WITH SEMGLEE DAILY AS DIRECTED   Refills: 0     Blood Pressure Kit Mary      1 each daily.   Quantity: 1 each  Refills: 2     Blood Pressure Monitoring Mary      Monitor blood pressure daily   Quantity: 1 each  Refills: 0     Blood Pressure Monitor Mary      Use to monitor blood pressure at home   Quantity: 1 each  Refills: 0     Contour Next EZ w/Device Kit      1 kit by Does not apply route daily.   Quantity: 1 kit  Refills: 0     Contour Next Test Strp  Generic drug: Glucose Blood      1 each by Other route 3 (three) times daily.   Quantity: 100 strip  Refills: 5     FreeStyle Stanford 3 Sensor Misc      1 each every 14 (fourteen) days.   Quantity: 2 each  Refills: 11     losartan 100 MG Tabs  Commonly known as: Cozaar      TAKE 1 TABLET(100 MG) BY MOUTH DAILY    Quantity: 90 tablet  Refills: 0     Microlet Lancets Misc      TEST THREE TIMES DAILY   Quantity: 300 each  Refills: 0     Ozempic (0.25 or 0.5 MG/DOSE) 2 MG/3ML Sopn  Generic drug: semaglutide      INJECT 0.5 MG UNDER THE SKIN ONCE A WEEK   Quantity: 3 mL  Refills: 0               Where to Get Your Medications        These medications were sent to [id number] - [place], [place] - [facility] [place] [address], [phone], [phone]  [address] [place] [address]      Phone: [phone]   metRONIDAZOLE 500 MG Tabs       Please  your prescriptions at the location directed by your doctor or nurse    Bring a paper prescription for each of these medications  cefTRIAXone in dextrose 5% IVPB premix         [place]    Follow-up appointment:   Lloyd Guaman MD  [address]  [facility] [address]  [phone]    Schedule an appointment as soon as possible for a visit in 1 week(s)      Sara Willingham MD  [address] [place] [address]  [facility] [address]  [phone]    Follow up in 2 week(s)  Please call to make an appointment    Sharad Arreola DPM  [address]  [facility] [address]  [phone]    Follow up in 1 week(s)      Appointments for Next 30 Days 11/13/2024 - 12/13/2024        Date Arrival Time Visit Type Length Department Provider     11/15/2024 10:00 AM  [facility] FOLLOW UP [6010] 60 min [facility] Re Hidalgo APRN    Patient Instructions:         Location Instructions:     Masks are optional for all patients and visitors, unless otherwise indicated.               11/19/2024  1:30 PM  POST OP VISIT [1366] 15 min [facility], [address] [place] [address] Sharad Arreola DPM    Patient Instructions:         Location Instructions:     Your appointment is located at [address] [address]. The facility is approximately 1/2 mile west of [address] on [address] [place] [address] at the corner of [facility]  [place] and [facility]. Please park in the Red Lot, enter through Building [address] and proceed to the Lower Level.  Masks are optional for all patients and visitors, unless otherwise indicated.                      Vital signs:   /74 (BP Location: Left arm)   Pulse 72   Temp 98.5 °F (36.9 °C) (Oral)   Resp 18   Ht 5' 10\" (1.778 m)   Wt 186 lb 1.1 oz (84.4 kg)   SpO2 96%   BMI 26.70 kg/m²       Physical Exam:    General: No acute distress   Lungs: clear to auscultation  Cardiovascular: S1, S2  Abdomen: Soft    -----------------------------------------------------------------------------------------------  PATIENT DISCHARGE INSTRUCTIONS: See electronic chart    Diya Ferguson,     Total time spent on discharge plannin minutes     The 21st Century Cures Act makes medical notes like these available to patients in the interest of transparency. Please be advised this is a medical document. Medical documents are intended to carry relevant information, facts as evident, and the clinical opinion of the practitioner. The medical note is intended as peer to peer communication and may appear blunt or direct. It is written in medical language and may contain abbreviations or verbiage that are unfamiliar.

## 2024-11-12 NOTE — CM/SW NOTE
JUANA contacted [facility] infusion to follow up on insurance authorization, spoke to Margarita. Per Margarita, Monisha is currently on the phone w/ pt's insurance. She reported updating auth request to [facility]. Contact info for JUANA provided to [facility], to offer assistance.    FRED LinoN, CMSRN    [id number]

## 2024-11-12 NOTE — PROGRESS NOTES
[facility]   part of [facility] ID PROGRESS NOTE    Farzad Romo Patient Status:  Inpatient    1975 MRN [id number]   Location [address] Attending Diya Ferguson, DO   Hosp Day # 5 PCP Lloyd Guaman MD     Abx: IV Zosyn-->  IV Unasyn--> CTX/ flagyl total D#4    Subjective: Patient seen and examined today. Denies any significant pain. No complaints. Afebrile. On room air.     Allergies:  Allergies[1]    Medications:    Current Facility-Administered Medications:     metRONIDAZOLE (Flagyl) tab 500 mg, 500 mg, Oral, BID with meals    insulin degludec (Tresiba) 100 units/mL flextouch 12 Units, 12 Units, Subcutaneous, Nightly    cefTRIAXone (Rocephin) 2 g in sodium chloride 0.9% 100 mL IVPB-ADDV, 2 g, Intravenous, Q24H    amLODIPine (Norvasc) tab 10 mg, 10 mg, Oral, Daily    atorvastatin (Lipitor) tab 20 mg, 20 mg, Oral, Nightly    losartan (Cozaar) tab 100 mg, 100 mg, Oral, Daily    glucose (Dex4) 15 GM/59ML oral liquid 15 g, 15 g, Oral, Q15 Min PRN **OR** glucose (Glutose) 40% oral gel 15 g, 15 g, Oral, Q15 Min PRN **OR** glucose-vitamin C (Dex-4) chewable tab 4 tablet, 4 tablet, Oral, Q15 Min PRN **OR** dextrose 50% injection 50 mL, 50 mL, Intravenous, Q15 Min PRN **OR** glucose (Dex4) 15 GM/59ML oral liquid 30 g, 30 g, Oral, Q15 Min PRN **OR** glucose (Glutose) 40% oral gel 30 g, 30 g, Oral, Q15 Min PRN **OR** glucose-vitamin C (Dex-4) chewable tab 8 tablet, 8 tablet, Oral, Q15 Min PRN    insulin aspart (NovoLOG) 100 Units/mL FlexPen 1-10 Units, 1-10 Units, Subcutaneous, TID AC and HS    melatonin tab 3 mg, 3 mg, Oral, Nightly PRN    polyethylene glycol (PEG 3350) (Miralax) 17 g oral packet 17 g, 17 g, Oral, Daily PRN    sennosides (Senokot) tab 17.2 mg, 17.2 mg, Oral, Nightly PRN    bisacodyl (Dulcolax) 10 MG rectal suppository 10 mg, 10 mg, Rectal, Daily PRN    ondansetron (Zofran) 4 MG/2ML injection 4 mg, 4 mg, Intravenous, Q6H PRN    prochlorperazine (Compazine) 10  MG/2ML injection 5 mg, 5 mg, Intravenous, Q8H PRN    benzonatate (Tessalon) cap 200 mg, 200 mg, Oral, TID PRN    guaiFENesin (Robitussin) 100 MG/5 ML oral liquid 200 mg, 200 mg, Oral, Q4H PRN    glycerin-hypromellose- (Artificial Tears) 0.2-0.2-1 % ophthalmic solution 1 drop, 1 drop, Both Eyes, QID PRN    sodium chloride (Saline Mist) 0.65 % nasal solution 1 spray, 1 spray, Each Nare, Q3H PRN    enoxaparin (Lovenox) 40 MG/0.4ML SUBQ injection 40 mg, 40 mg, Subcutaneous, Daily    acetaminophen (Tylenol) tab 650 mg, 650 mg, Oral, Q4H PRN **OR** HYDROcodone-acetaminophen (Norco) 5-325 MG per tab 1 tablet, 1 tablet, Oral, Q4H PRN **OR** HYDROcodone-acetaminophen (Norco) 5-325 MG per tab 2 tablet, 2 tablet, Oral, Q4H PRN    HYDROmorphone (Dilaudid) 1 MG/ML injection 0.2 mg, 0.2 mg, Intravenous, Q2H PRN **OR** HYDROmorphone (Dilaudid) 1 MG/ML injection 0.4 mg, 0.4 mg, Intravenous, Q2H PRN **OR** HYDROmorphone (Dilaudid) 1 MG/ML injection 0.8 mg, 0.8 mg, Intravenous, Q2H PRN    Review of Systems:  Completed. See pertinent positives and negatives above.     Physical Exam:  Vital signs: Blood pressure 148/74, pulse 72, temperature 98.5 °F (36.9 °C), temperature source Oral, resp. rate 18, height 177.8 cm (5' 10\"), weight 186 lb 1.1 oz (84.4 kg), SpO2 96%.    General: Alert, oriented, NAD, on room air.   HEENT: Moist mucous membranes.   Neck: No lymphadenopathy.  Supple.  Respiratory: Non-labored breathing.   Abdomen: Nondistended.   Musculoskeletal: LLE edema improving  Integument: Dressing over L foot- clean/dry/intact (picture reviewed on podiatry note)    Laboratory Data:  Recent Labs   Lab 11/10/24  1050   RBC 3.81*   HGB 11.3*   HCT 31.6*   MCV 82.9   MCH 29.7   MCHC 35.8   RDW 12.4   NEPRELIM 6.86   WBC 9.7   .0     Recent Labs   Lab 11/07/24  1049 11/08/24  0725 11/10/24  1050   * 153* 180*   BUN 11 9 7*   CREATSERUM 1.19 0.90 0.91   CA 9.7 8.6* 8.9   ALB 3.6 3.9  --    * 139 138   K 3.9 3.7  3.7    106 106   CO2 28.0 28.0 32.0   ALKPHO 106 85  --    AST 10* 11  --    ALT 19 10  --    BILT 1.0 0.7  --    TP 8.8* 6.9  --        Microbiology: Reviewed in EMR    Susceptibility data from last 90 days.  Collected Specimen Info Organism Cefazolin Ciprofloxacin Gentamicin Levofloxacin Meropenem Piperacillin/Tazobactam Trimethoprim/Sulfamethoxazole   11/09/24 Tissue from Foot,left Streptococcus agalactiae (Group B beta strep)            Pasteurella multocida          11/09/24 Tissue from Foot,left Citrobacter koseri  S  S  S  S  S  S  S     Streptococcus agalactiae (Group B beta strep)          11/09/24 Abscess from Foot,left Citrobacter koseri            Streptococcus agalactiae (Group B beta strep)          11/08/24 Other from Foot,left Streptococcus agalactiae (Group B beta strep)            Pasteurella multocida                Radiology: Reviewed.    PROCEDURE:  MRI FOOT (W+WO), LEFT (CPT=73720)     LOCATION:  [place]     COMPARISON:  None.     INDICATIONS:  Ulcer to plantar surface of 1st digit.  Evaluate for infection/osteomyelitis.     TECHNIQUE:  A variety of imaging planes and parameters were utilized for visualization of suspected pathology.  Images were performed without and with intravenous gadolinium contrast.     PATIENT STATED HISTORY:(As transcribed by Technologist)  Patient presents with ulcer to the plantar surface of the first digit, pain, redness, and swelling to the left foot. Symptoms started about a week ago.      CONTRAST USED:  17 mL of Dotarem     FINDINGS:    Soft tissue ulceration noted along the plantar aspect of the 1st MTP joint.  Moderate, diffuse subcutaneous edema of the forefoot with asymmetric hyperemia along the plantar aspect of the 1st MTP joint most in keeping with cellulitis.  Heterogeneously  enhancing phlegmon or developing abscess along the plantar aspect of the 1st MTP joint measures 2.5 x 1.1 x 2.4 cm (series 15, image 23, series 13, image 13).  This encases  the hallux sesamoids.       Note made of bipartite medial hallux sesamoid with marrow edema/hyperemia of the distal sesamoid fragment (series 8, image 13, series 13, image 13), additionally with prominent T1 signal hypointensity of both sesamoid fragments.  Sagittal T1 sequence  demonstrates cortical irregularity along the plantar aspect of the distal sesamoid fragment (series 10, image 21).  Imaging features raise suspicion for early changes of acute osteomyelitis involving the distal sesamoid fragment.       Minimal fluid distention of the tendon sheath of the flexor hallucis longus consistent mild tenosynovitis, possibly infectious.     The aforementioned soft tissue ulceration along the plantar aspect of the 1st MTP joint continues as a tract into the deeper subcutaneous tissues along the plantar aspect of the 1st MTP joint.  There is a small effusion of the 1st MTP joint, perhaps with   subtle marrow edema flanking the joint.  In the absence of significant osteoarthritis, findings raise the possibility of joint infection.     Edema and atrophy of the intrinsic forefoot musculature, likely sequela of chronic diabetic neuropathy.     No fracture or malalignment of the forefoot.     Impression:    CONCLUSION:       1. Skin ulceration along the plantar aspect of the 1st MTP joint with moderate regional subcutaneous edema/hyperemia most in keeping with cellulitis.  Heterogeneously enhancing phlegmon or developing abscess along the plantar aspect of the 1st MTP joint  measures 2.5 x 1.1 x 2.4 cm.     2. The previously referenced phlegmon or developing abscess along the plantar aspect of the 1st MTP joint encases the hallux sesamoids with cortical irregularity and signal alteration of the distal sesamoid fragment (of the bipartite medial hallux  sesamoid) suspicious for acute osteomyelitis.     3. The skin ulceration along the plantar aspect of the 1st MTP joint continues as a thin tract into the deeper soft tissues  where there is an associated small effusion of the 1st MTP joint.  This could represent reactive or infected joint fluid.     4. Mild fluid distention of the flexor digitorum longus tendon sheath consistent with mild tenosynovitis, possibly infectious.       LOCATION:  [facility]     Dictated by (CST): Yobani Trinidad MD on 11/08/2024 at 10:48 AM      Finalized by (CST): Yobani Trinidad MD on 11/08/2024 at 11:11 AM        PROCEDURE:  XR FOOT, COMPLETE (MIN 3 VIEWS), LEFT (CPT=73630)     TECHNIQUE:  AP, oblique, and lateral views were obtained.     COMPARISON:  None.     INDICATIONS:  left foot pain and swelling for 5 days     PATIENT STATED HISTORY: (As transcribed by Technologist)  Patient has swelling and redness to the left foot for 5 days.  He also has an open wound on the planter side of his foot.  States he started having a swelling to the 1st digit and then it spread  to the dorsal side of the foot.      FINDINGS:    BONES:  Osseous structures are intact.  There is soft tissue swelling particularly adjacent to the 1st metatarsal phalangeal joint where there are few punctate lucent foci.  No dislocation.     Impression:    CONCLUSION:    1. Soft tissue swelling particularly adjacent to the 1st metatarsal phalangeal joint where there are few punctate lucent foci, neither an abscess nor osteomyelitis can be excluded, recommend MRI for further evaluation as clinically indicated.     LOCATION:  [facility]     Dictated by (CST): Jayne Pina MD on 11/07/2024 at 11:48 AM      Finalized by (CST): Jayne Pina MD on 11/07/2024 at 11:50 AM    ASSESSMENT:  Left diabetic foot infection with cellulitis, abscess, septic 1st MTP jt and OM  MRI c/w abscess/phlegmon along plantar aspect of 1st MTP, OM of hallux sesamoid and tenosynovitis; also with small effusion of 1st MTP joint.   Superficial culture with pasteurella and GBS.   S/p I&D of left foot abscess with excision of tibial sesamoid 11/9, cxs with citrobacter, GBS, pasterurella,  bacteroides and prevotella.   Left plantar foot ulcer  Leukocytosis on admission, resolved. D/t above.   DM II, hgb A1C 7.9 (10/2024)  HTN, HLD    PLAN:  - continue IV CTX and flagyl  - follow blood cultures- ngtd  - anaerobic OR culture pending; pathology pending  - follow temps and wbc  - podiatry eval appreciated, noted plans for dressing change later today  - PICC placed to RUE 11/11. Plan for 6 weeks of IV CTX (tentative EOT 12/22) and po flagyl x 4 weeks. Ok for dc from ID standpoint, once IV abx are arranged.     Discussed case with RN, Dr. Willingham and patient.   Will follow    Sally Marin PA-C    ID ATTENDING ADDENDUM     Pt seen an examined independently. Chart reviewed. Agree with above. Note has been reviewed by me and modified as needed.  Exam and Impression/ Recs as noted above.  More than 50% of clinical time and 100% of the clinical decision making performed by me.    Sara Willingham MD         [1]   Allergies  Allergen Reactions    [place] HIVES    [place] (Diagnostic) RASH    Seasonal Runny nose

## 2024-11-12 NOTE — PLAN OF CARE
Pt denies any pain or discomfort. BP stable, afebrile. Ace wrap dsg to lt foot CDI. Ambulatory with use of walker, NWB maintained with use of post-op shoe. Leg elevated on pillows when in bed. Plan for dc home tomorrow.

## 2024-11-13 ENCOUNTER — PATIENT OUTREACH (OUTPATIENT)
Dept: CASE MANAGEMENT | Age: 49
End: 2024-11-13

## 2024-11-13 DIAGNOSIS — L08.9 DIABETIC INFECTION OF LEFT FOOT (HCC): Primary | ICD-10-CM

## 2024-11-13 DIAGNOSIS — E11.628 DIABETIC INFECTION OF LEFT FOOT (HCC): Primary | ICD-10-CM

## 2024-11-13 DIAGNOSIS — Z02.9 ENCOUNTERS FOR ADMINISTRATIVE PURPOSES: ICD-10-CM

## 2024-11-13 RX ORDER — ATORVASTATIN CALCIUM 20 MG/1
20 TABLET, FILM COATED ORAL NIGHTLY
Qty: 30 TABLET | Refills: 5 | Status: SHIPPED | OUTPATIENT
Start: 2024-11-13

## 2024-11-13 NOTE — PROGRESS NOTES
Transitional Care Management   Discharge Date: 24  Contact Date: 2024    Assessment:  TCM Initial Assessment    General:  Assessment completed with: Patient  Patient Subjective: I'm doing okay. There's really not any pain. I'm keeping it elevated as much as I can.  Chief Complaint: Diabetic Infection Left Foot  Verify patient name and  with patient/ caregiver: Yes    Hospital Stay/Discharge:  Tell me what you understand of why you were in the hospital or emergency department: left foot infection  Prior to leaving the hospital were your Discharge Instructions reviewed with you?: Yes  Did you receive a copy of your written Discharge Instructions?: Yes  What questions do you have about your Discharge Instructions?: none  Do you feel better or worse since you left the hospital or emergency department?: Better    Follow - Up Appointment:  Do you have a follow-up appointment?: Yes  Date: 11/15/24  Physician: TCC  Are there any barriers to getting to your follow-up appointment?: No    Home Health/DME:  Prior to leaving the hospital was Home Health (HH) arranged for you?: Yes  Has HH been out or set up a visit to see you?: Visit Scheduled  Date: 24 (Per pt, RN is scheduled to come today at )     Prior to leaving the hospital or emergency department was Durable Medical Equipment (DME), medical supplies, or infusions arranged for you?: Yes  Have you received your DME/supplies/infusions?: Yes  Do you have questions about using your DME/supplies/infusions?: No     Medications/Diet:  Did any of your medications change, during or after your hospital stay or ED visit?: Yes  Do you have your new or updated medications?: Yes  Do you understand what your medications are for and possible side effects?: Yes  Are there any reasons that keep you from taking your medication as prescribed?: No  Any concerns about medication refills?: No    Were you given a different diet per your Discharge Instructions?: No      Questions/Concerns:  Do you have any questions or concerns that have not been discussed?: No       Nursing Interventions: NCM reviewed discharge instructions and when to seek medical attention with the patient. He states that he is doing okay and really doesn't have any pain. He confirms that left foot is wrapped and he is keeping it elevated as much as he can. He states that his stomach is a little upset from the antibiotics but it's manageable. He has not checked his bp or bs yet but states that he will. He denied having any fever, vomiting, c/d, sob, lightheadedness, HA or any other new or worsening symptoms. Med review completed. He denied having any questions or concerns at this time.     Medication Review:   Current Outpatient Medications   Medication Sig Dispense Refill    ATORVASTATIN 20 MG Oral Tab TAKE 1 TABLET(20 MG) BY MOUTH EVERY NIGHT 30 tablet 5    metRONIDAZOLE 500 MG Oral Tab Take 1 tablet (500 mg total) by mouth 2 (two) times daily with meals for 28 days. 56 tablet 0    cefTRIAXone in dextrose 5% (ROCEPHIN) IVPB premix Inject 50 mL (2 g total) into the vein daily. Will need weekly labs with CBC, BMP, ESR and CRP while on this medication. 2050 mL 0    OZEMPIC, 0.25 OR 0.5 MG/DOSE, 2 MG/3ML Subcutaneous Solution Pen-injector INJECT 0.5 MG UNDER THE SKIN ONCE A WEEK 3 mL 0    BD PEN NEEDLE MINI U/F 31G X 5 MM Does not apply Misc USE WITH SEMGLEE DAILY AS DIRECTED      amLODIPine 5 MG Oral Tab Take 1 tablet (5 mg total) by mouth 2 (two) times daily. Ok to take 1-2 tablets if BP average is over 150/90 60 tablet 5    LOSARTAN 100 MG Oral Tab TAKE 1 TABLET(100 MG) BY MOUTH DAILY 90 tablet 0    FREESTYLE SUNNY 3 SENSOR Does not apply Misc 1 each every 14 (fourteen) days. 2 each 11    Glucose Blood (CONTOUR NEXT TEST) In Vitro Strip 1 each by Other route 3 (three) times daily. 100 strip 5    Blood Pressure Monitor Does not apply Device Use to monitor blood pressure at home 1 each 0    Blood Pressure  Monitoring Does not apply Device Monitor blood pressure daily 1 each 0    Blood Pressure Monitoring (BLOOD PRESSURE KIT) Does not apply Device 1 each daily. 1 each 2    Blood Glucose Monitoring Suppl (CONTOUR NEXT EZ) w/Device Does not apply Kit 1 kit by Does not apply route daily. 1 kit 0    MICROLET LANCETS Does not apply Misc TEST THREE TIMES DAILY 300 each 0     Did patient review medications using current pill bottles and not just a medication list?  No  Discharge medications reviewed/discussed/and reconciled against outpatient medications with patient.  Any changes or updates to medications sent to primary care provider.  Patient Acknowledged    SDOH:  Transportation Needs: No Transportation Needs (11/7/2024)    Transportation Needs     Lack of Transportation: No     Car Seat: Not on file     Financial Resource Strain: Low Risk  (11/13/2024)    Financial Resource Strain     Difficulty of Paying Living Expenses: Not hard at all     Med Affordability: No         Follow-up Appointments:  Your appointments       Date & Time Appointment Department ([place])    Nov 15, 2024 10:00 AM CST Hospital Follow Up with Re Hidalgo APRN [facility] ([facility])        Nov 19, 2024 1:30 PM CST Post Op Visit with Sharad Arreola DPM [address] ([facility])        Dec 23, 2024 2:15 PM CST Diabetes Pump follow up with Laly Alcantara APRN [address] ([facility])              [address]  [facility]  [address] [place] [id number] [phone] [address]  [facility]  [address] [facility] [id number] [phone] [facility]  [address]  [address] [address] [phone]            Transitional Care  Clinic  Was TCC Ordered: Yes  Was TCC Scheduled: Yes   - If yes: [x]  Advised patient to bring all medications and blood glucose meter/supplies if applicable.      Specialist  Does the patient have any other follow-up appointment(s) that need to be scheduled? Yes   -If yes: Nurse Care Manager reviewed upcoming specialist appointments with patient: Yes   -Does the patient need assistance scheduling appointment(s): No, pt states that he believes he has an appt with ID but is not sure so he will check. He does have an appt with podiatrist on 11/19 and diabetes center on 12/23    CCM referral placed:  No    Book By Date: 11/26/24

## 2024-11-14 PROBLEM — L02.416 CELLULITIS AND ABSCESS OF LEFT LOWER EXTREMITY: Status: ACTIVE | Noted: 2024-11-14

## 2024-11-14 PROBLEM — E87.1 HYPONATREMIA: Status: ACTIVE | Noted: 2024-11-14

## 2024-11-14 PROBLEM — L03.116 CELLULITIS AND ABSCESS OF LEFT LOWER EXTREMITY: Status: ACTIVE | Noted: 2024-11-14

## 2024-11-14 PROBLEM — L03.116 CELLULITIS OF LEFT LOWER EXTREMITY: Status: ACTIVE | Noted: 2024-11-14

## 2024-11-14 NOTE — PROGRESS NOTES
TCM VISIT  [facility] TRANSITIONAL CARE CLINIC      HISTORY   Chief complaint: Hospital follow-up    HPI: Farzad Romo is a 49 year old male here today for hospital follow up for diabetic infection of left foot, acute osteomyelitis of left foot, left lower extremity cellulitis with abscess, DMII uncontrolled, HTN, hyponatremia, constipation.  Farzad Romo was discharged from [facility]  to Home or Self Care.  Admit Date: 11/7/24. Discharge Date: 11/12/24.     Hospital Discharge Diagnosis:     #Left Diabetic foot infection with abscess and possible tenosynovitis   #Septic joint (L 1st MTP)  #LLE cellulitis   #DM2  #HTN  #HLD    Hospital stay was uncomplicated.  Farzad Romo was discharge with PICC line care, IV ceftriaxone, weekly labs, Flagyl, HHC, postop shoe and a referral to the TCC for continued follow up.      Today, patient is being seen for hospital follow-up.  He reports doing okay since discharge.    He presented to ED for foot infection.  He notes that while he was in the [place] approximately 1 week ago, had been wearing new shoes that he thought were memory foam, however may have been tighter than usual.  States that he was walking quite a bit and notes that his left foot was getting more swollen, erythematous, and painful.  States he did have a ventral small ulceration prior to this however now has been noticing erythema on the dorsal aspect of the foot and great toe.  Notes associated chills and lightheadedness.  He was admitted with left diabetic foot ulcer/cellulitis.  He was seen by ID and podiatry.  MRI was concerning for abscess and possible tenosynovitis, and osteomyelitis.  He underwent I&D of left foot abscess and tibial sesamoid excision on 11/9.  His antibiotics were tailored to the culture results.  He had PICC line placed and home IV antibiotics were arranged.  He was cleared for discharge and discharged home in stable condition with close follow-up with  podiatry, ID, and PCP in outpatient setting.    Interactive contact within 2 business days post discharge first initiated on Date: 11/13/2024      Allergies:  Allergies[1]   Current Meds:  Current Outpatient Medications   Medication Sig Dispense Refill    ATORVASTATIN 20 MG Oral Tab TAKE 1 TABLET(20 MG) BY MOUTH EVERY NIGHT 30 tablet 5    metRONIDAZOLE 500 MG Oral Tab Take 1 tablet (500 mg total) by mouth 2 (two) times daily with meals for 28 days. 56 tablet 0    cefTRIAXone in dextrose 5% (ROCEPHIN) IVPB premix Inject 50 mL (2 g total) into the vein daily. Will need weekly labs with CBC, BMP, ESR and CRP while on this medication. 2050 mL 0    OZEMPIC, 0.25 OR 0.5 MG/DOSE, 2 MG/3ML Subcutaneous Solution Pen-injector INJECT 0.5 MG UNDER THE SKIN ONCE A WEEK 3 mL 0    BD PEN NEEDLE MINI U/F 31G X 5 MM Does not apply Misc USE WITH SEMGLEE DAILY AS DIRECTED      amLODIPine 5 MG Oral Tab Take 1 tablet (5 mg total) by mouth 2 (two) times daily. Ok to take 1-2 tablets if BP average is over 150/90 60 tablet 5    LOSARTAN 100 MG Oral Tab TAKE 1 TABLET(100 MG) BY MOUTH DAILY 90 tablet 0    FREESTYLE SUNNY 3 SENSOR Does not apply Misc 1 each every 14 (fourteen) days. 2 each 11    Glucose Blood (CONTOUR NEXT TEST) In Vitro Strip 1 each by Other route 3 (three) times daily. 100 strip 5    Blood Pressure Monitor Does not apply Device Use to monitor blood pressure at home 1 each 0    Blood Pressure Monitoring Does not apply Device Monitor blood pressure daily 1 each 0    Blood Pressure Monitoring (BLOOD PRESSURE KIT) Does not apply Device 1 each daily. 1 each 2    Blood Glucose Monitoring Suppl (CONTOUR NEXT EZ) w/Device Does not apply Kit 1 kit by Does not apply route daily. 1 kit 0    MICROLET LANCETS Does not apply Misc TEST THREE TIMES DAILY 300 each 0     No current facility-administered medications for this visit.       HISTORY: reconciled and reviewed with patient  Past Medical History:    Essential hypertension    High  blood pressure    Hyperlipidemia    Type II or unspecified type diabetes mellitus without mention of complication, not stated as uncontrolled    Visual impairment      Past Surgical History:   Procedure Laterality Date    Appendectomy  10 yrs ago      Family History   Problem Relation Age of Onset    Hypertension Father     Diabetes Father     Hypertension Mother     Diabetes Mother     Diabetes Sister     Diabetes Sister     Diabetes Brother     Dementia Maternal Aunt     Dementia Maternal Aunt     Dementia Maternal Aunt       Social History     Socioeconomic History    Marital status: Single   Tobacco Use    Smoking status: Never     Passive exposure: Never    Smokeless tobacco: Never   Vaping Use    Vaping status: Never Used   Substance and Sexual Activity    Alcohol use: Not Currently     Comment: rarely    Drug use: No   Other Topics Concern    Caffeine Concern No     Comment: very little    Exercise Yes     Comment: 4x per week    Seat Belt Yes    Special Diet No    Stress Concern Yes    Weight Concern No     Social Drivers of Health     Financial Resource Strain: Low Risk  (11/13/2024)    Financial Resource Strain     Difficulty of Paying Living Expenses: Not hard at all     Med Affordability: No   Food Insecurity: No Food Insecurity (11/7/2024)    Food Insecurity     Food Insecurity: Never true   Transportation Needs: No Transportation Needs (11/7/2024)    Transportation Needs     Lack of Transportation: No   Housing Stability: Low Risk  (11/7/2024)    Housing Stability     Housing Instability: No        Imaging & Consults:  XR FLUOROSCOPY C-ARM TIME LESS THAN 1 HOUR (CPT=76000)    Result Date: 11/9/2024  CONCLUSION:   Multiple fluoroscopic images document incision and drainage procedure involving the soft tissues about the 1st MTP joint.  Please refer to dedicated procedure report for full detail.   LOCATION:  [place]    Dictated by (CST): Yobani Trinidad MD on 11/09/2024 at 12:21 PM     Finalized by (CST): Vivi  MD Yobani on 11/09/2024 at 12:23 PM       MRI FOOT (W+WO), LEFT (CPT=73720)    Result Date: 11/8/2024  CONCLUSION:   1. Skin ulceration along the plantar aspect of the 1st MTP joint with moderate regional subcutaneous edema/hyperemia most in keeping with cellulitis.  Heterogeneously enhancing phlegmon or developing abscess along the plantar aspect of the 1st MTP joint measures 2.5 x 1.1 x 2.4 cm.  2. The previously referenced phlegmon or developing abscess along the plantar aspect of the 1st MTP joint encases the hallux sesamoids with cortical irregularity and signal alteration of the distal sesamoid fragment (of the bipartite medial hallux sesamoid) suspicious for acute osteomyelitis.  3. The skin ulceration along the plantar aspect of the 1st MTP joint continues as a thin tract into the deeper soft tissues where there is an associated small effusion of the 1st MTP joint.  This could represent reactive or infected joint fluid.  4. Mild fluid distention of the flexor digitorum longus tendon sheath consistent with mild tenosynovitis, possibly infectious.    LOCATION:  [place]   Dictated by (CST): Yobani Trinidad MD on 11/08/2024 at 10:48 AM     Finalized by (CST): Yobani Trinidad MD on 11/08/2024 at 11:11 AM       XR FOOT, COMPLETE (MIN 3 VIEWS), LEFT (CPT=73630)    Result Date: 11/7/2024  CONCLUSION:  1. Soft tissue swelling particularly adjacent to the 1st metatarsal phalangeal joint where there are few punctate lucent foci, neither an abscess nor osteomyelitis can be excluded, recommend MRI for further evaluation as clinically indicated.   LOCATION:  [facility]   Dictated by (CST): Jayne Pina MD on 11/07/2024 at 11:48 AM     Finalized by (CST): Jayne Pina MD on 11/07/2024 at 11:50 AM        Lab Results   Component Value Date/Time    WBC 9.7 11/10/2024 10:50 AM    HGB 11.3 (L) 11/10/2024 10:50 AM    HCT 31.6 (L) 11/10/2024 10:50 AM    .0 11/10/2024 10:50 AM     (H) 11/10/2024 10:50 AM     11/10/2024 10:50 AM     K 3.7 11/10/2024 10:50 AM     11/10/2024 10:50 AM    CO2 32.0 11/10/2024 10:50 AM    BUN 7 (L) 11/10/2024 10:50 AM    CREATSERUM 0.91 11/10/2024 10:50 AM    CA 8.9 11/10/2024 10:50 AM    MG 1.8 11/09/2024 04:52 AM    PHOS 3.0 11/08/2024 07:25 AM    ALB 3.9 11/08/2024 07:25 AM    ALT 10 11/08/2024 07:25 AM    AST 11 11/08/2024 07:25 AM    INR 1.26 (H) 11/08/2024 07:25 AM    A1C 7.9 (H) 10/11/2024 10:33 AM       Immunization History   Administered Date(s) Administered    Covid-19 Vaccine Moderna 100 mcg/0.5 ml 04/08/2021, 05/24/2021    Covid-19 Vaccine Moderna 50 Mcg/0.25 Ml 11/26/2021    Covid-19 Vaccine Moderna Bivalent 50mcg/0.5mL 12+ years 11/27/2022    FLULAVAL 6 months & older 0.5 ml Prefilled syringe (40380) 11/28/2017, 10/19/2020, 11/18/2021    FLUZONE 6 months and older PFS 0.5 ml ([id number]) 10/20/2015, 10/31/2016, 11/28/2017, 10/19/2020, 11/18/2021    Influenza 10/30/2014    Pneumococcal Conjugate PCV20 07/15/2022    TDAP 12/30/2009, 06/27/2023    Zoster Vaccine Recombinant Adjuvanted (Shingrix) 06/27/2023, 12/14/2023       ROS:  GENERAL: energy stable, denies fever, weakness  SKIN: denies any skin changes, + left foot dressing in place  EYES: denies blurred vision, eye pain  HEENT: denies ear pain, loss of hearing, sore throat  RESPIRATORY: denies cough, denies dyspnea with exertion  CARDIOVASCULAR: denies syncope, chest pain, fatigue, palpitations   GI: denies abdominal pain, melena, + constipation, denies diarrhea, nausea, vomiting   MUSCULOSKELETAL: denies pain, states normal range of motion of extremities, + NWB to LLE  NEUROLOGIC: denies confusion, + balance difficulty, + using cane  PSYCHIATRIC: denies depression or anxiety  HEMATOLOGIC: + mild anemia, denies bruising, bleeding    PHYSICAL EXAM:  Vitals:    11/15/24 1011   BP: 140/82   Pulse: 82   Resp: 15   Temp: 97.2 °F (36.2 °C)       GENERAL: well developed, well nourished, in no apparent distress, good historian  INTEGUMENTARY: warm, dry,  no rashes, + left foot dressing in place  HEENT: atraumatic, normocephalic, sclera white, conjunctivae pink  NECK: supple  CHEST: no chest tenderness  LUNGS: clear to auscultation bilaterally, no wheezes, rhonchi or rales, normal respiratory effort  CARDIO: S1, S2, RRR without audible murmur  GI: - BS to all quadrants, no tenderness  MUSCULOSKELETAL: + ROM in all joints, no evidence of swelling, pain, NWB to LLE   EXTREMITIES: no cyanosis, or edema  NEURO: Oriented x3  PSYCHIATRIC: appropriate affect    ASSESSMENT/ PLAN:   1. Diabetic infection of left foot/acute osteomyelitis of left foot/cellulitis and abscess of LLE  MRI of foot showed skin ulceration along the plantar aspect of the first MTP joint with moderate regional subcutaneous edema/hyperemia consistent with cellulitis-heterogenously enhancing phlegmon or developing abscess along the plantar aspect of the first MTP joint-phlegmon or developing abscess along the plantar aspect of the first MTP joint encases the hallux sesamoids with cortical irregularity and signal alteration of the distal sesamoid fragment suspicious for osteomyelitis  Underwent I&D of left foot abscess and tibial sesamoid excision on 11/9 per podiatry  His antibiotics were tailored to the culture results  Was seen by infectious disease and podiatry  Patient will require IV antibiotics postdischarge  Has dressing with Ace wrap present to left foot that is D/I and is to be left in place until follow-up with podiatry  Aerobic cultures grew 2+ Pasteurella, 2+ GBS, 4+ Citrobacter  Anaerobic cultures grew 2+ bacteroides  Patient denies feeling any swelling  Denies any pain to the foot  No drainage noted on bandages  Denies nausea/vomiting  Denies fevers/chills  Denies lightheadedness  Recommended to continue  Apply heat as needed on for 15 minutes and off for 15 minutes with barrier throughout the day  Elevate leg above level of the heart as often as possible  Maintain nonweightbearing  status-heel weightbearing-using a cane  Postop shoe in place  Right PICC line present with dressing D/I  Continue HH RN for PICC line dressing changes and weekly labs of CBC, BMP, CRP, ESR  Discharged home on  Ceftriaxone 2 g IV daily through 12/22  Metronidazole 500 mg 2 times daily through 12/10  [place] [facility] providing antibiotic and dressing change supplies  Tolerating an oral diet  Appetite is good/drinking well  Moving bowels-constipation/passing gas  Follow-up with podiatry Dr. Arreola on 11/19 at 1:30 PM  Follow-up with DM CONNOR Alcantara on 12/23 at 2:15 PM  Follow-up with infectious disease Dr. Willingham on 11/19  Follow-up with ophthalmology Dr. Rapp on 11/15 at 12 PM  Follow-up with PCP Dr. Guaman-patient requesting to make appointment in 4 weeks  All pertinent hospital records, labs, radiology from current hospitalization reviewed    2. Uncontrolled type 2 diabetes mellitus with hyperglycemia (HCC)  Uncontrolled with A1c of 7.9%  Patient had not been checking BS since discharge and placed CGM freestyle 3 today  Reports BS this morning was 176  Continue to check BS 3-4 times per day and keep log to bring with to all follow-ups for review  Discussed good glycemic control needed for foot wound healing  Continue  Atorvastatin 20 mg nightly  Losartan 100 mg daily  Ozempic 0.5 mg weekly  Has but only uses as needed  Semglee 25 units daily  Metformin 1000 mg twice daily  Goals discussed:  A1c of 6.7-7.0%  Fasting BS of   Premeal  or less  2 hours postprandial  or less  Continue to monitor for S/S of hyper hypoglycemia  Reinforced with patient he needs to update DM CONNOR and Dr. Guaman medication lists as they report he should be taking the Semglee and metformin which she is not currently taking  Follow-up with DM APN as directed  Further A1c monitoring per DM APN    3. Primary hypertension  BP stable at today's exam at 140/82 with HR of 82  Discussed goal BP with DMII should be  120-130''s/70-80s  Continue checking BP daily at home and keep log of BP and HR to bring with to follow-ups for review  Continue  Amlodipine 5 mg twice daily-okay to take 1-2 tabs if BP average is over 150/90  Discussed patient should discuss with PCP whether patient should take 1 or 2 tabs as taking a second tab as needed may not be effective  Continue  Losartan 100 mg daily  Continue to monitor for S/S of hyper hypotension  Follow-up with PCP as directed    4. Hyponatremia  Noted with hyponatremia with sodium of 134  Treated with IV hydration  Sodium on 11/10 prior to discharge was stable at 138  Continue to monitor labs as directed    5. Constipation, unspecified constipation type  Patient reports last bowel movement was Saturday-approximately 6 days ago  No bowel sounds heard upon auscultation  Abdomen is soft nontender/nondistended  Patient is passing gas  Recommended  Dulcolax suppository x 1 tonight and hold x 30 minutes to soften stool at the rectum  MiraLAX 17 g in 8 ounces of fluid at the same time  Continue  MiraLAX 17 g in 8 ounces of fluid daily until having bowel movement 2 consecutive days  If no bowel movement discussed may use OTC milk of mag and magnesium citrate to help stimulate a bowel movement  Goal is to have soft formed stool daily per patient's normal regimen  If no improvement in symptoms notify TCC/PCP      Orders Placed This Encounter    montelukast 10 MG Oral Tab     Sig: Take 1 tablet (10 mg total) by mouth nightly.    omega-3 fatty acids 1000 MG Oral Cap     Sig: Take 1,000 mg by mouth daily.           Medications & Refills for this Visit:   montelukast 10 MG Oral Tab Take 1 tablet (10 mg total) by mouth nightly.      omega-3 fatty acids 1000 MG Oral Cap Take 1,000 mg by mouth daily.      ATORVASTATIN 20 MG Oral Tab TAKE 1 TABLET(20 MG) BY MOUTH EVERY NIGHT 30 tablet 5    metRONIDAZOLE 500 MG Oral Tab Take 1 tablet (500 mg total) by mouth 2 (two) times daily with meals for 28 days. 56  tablet 0    cefTRIAXone in dextrose 5% (ROCEPHIN) IVPB premix Inject 50 mL (2 g total) into the vein daily. Will need weekly labs with CBC, BMP, ESR and CRP while on this medication. 2050 mL 0    OZEMPIC, 0.25 OR 0.5 MG/DOSE, 2 MG/3ML Subcutaneous Solution Pen-injector INJECT 0.5 MG UNDER THE SKIN ONCE A WEEK 3 mL 0    amLODIPine 5 MG Oral Tab Take 1 tablet (5 mg total) by mouth 2 (two) times daily. Ok to take 1-2 tablets if BP average is over 150/90 60 tablet 5    LOSARTAN 100 MG Oral Tab TAKE 1 TABLET(100 MG) BY MOUTH DAILY 90 tablet 0    FREESTYLE SUNNY 3 SENSOR Does not apply Misc 1 each every 14 (fourteen) days. 2 each 11     Requested Prescriptions      No prescriptions requested or ordered in this encounter         Health Maintenance:  Health Maintenance   Topic Date Due    COVID-19 Vaccine (5 - 2024-25 season) 09/01/2024    Influenza Vaccine (1) 10/01/2024    HTN: BP Follow-Up  12/07/2024    Annual Physical  01/25/2025    Diabetes Care A1C  04/11/2025    Diabetes Care Dilated Eye Exam  04/12/2025    Diabetes Care: Microalb/Creat Ratio  05/20/2025    Diabetes Care Foot Exam  10/18/2025    Diabetes Care: GFR  11/10/2025    PSA  01/25/2026    Colorectal Cancer Screening  10/12/2026    DTaP,Tdap,and Td Vaccines (3 - Td or Tdap) 06/27/2033    Annual Depression Screening  Completed    Pneumococcal Vaccine: Birth to 64yrs  Completed    Zoster Vaccines  Completed       Chronic Care Management Referral: N/A      Transitional Care Management Certification:  During the visit, I accessed [employer] and/or [employer] Everywhere and personally reviewed the following for the recent hospitalization: provider notes, consults, summaries, labs and other test results and the pertinent findings are documented below.     Medication Reconciliation:  I am aware of an inpatient discharge within the last 30 days.  The discharge medication list has been reconciled with the patient's current medication list and reviewed by me.  See medication list  for additions of new medication, and changes to current doses of medications and discontinued medications.        Discharge Disposition/Plan:     Future Appointments   Date Time Provider Department Center   11/19/2024  1:30 PM Sharad Arreola DPM [id number] [facility]   12/23/2024  2:15 PM Laly Alcantara APRN [id number]     1.  Transitional Care Clinic Visit: Next visit Discharged  2.  PCP Visit: Patient requests to make own follow-up appointment  3.  Chronic Care Management: N/A     KARMEN Otero, 11/14/2024  [facility]  [phone]         [1]   Allergies  Allergen Reactions    [place] HIVES and RASH    Seasonal Runny nose

## 2024-11-15 ENCOUNTER — OFFICE VISIT (OUTPATIENT)
Dept: INTERNAL MEDICINE CLINIC | Facility: CLINIC | Age: 49
End: 2024-11-15
Payer: COMMERCIAL

## 2024-11-15 VITALS
RESPIRATION RATE: 15 BRPM | TEMPERATURE: 97 F | WEIGHT: 186 LBS | OXYGEN SATURATION: 98 % | SYSTOLIC BLOOD PRESSURE: 140 MMHG | BODY MASS INDEX: 26.63 KG/M2 | HEART RATE: 82 BPM | DIASTOLIC BLOOD PRESSURE: 82 MMHG | HEIGHT: 70 IN

## 2024-11-15 DIAGNOSIS — M86.172 OTHER ACUTE OSTEOMYELITIS OF LEFT FOOT (HCC): ICD-10-CM

## 2024-11-15 DIAGNOSIS — L08.9 DIABETIC INFECTION OF LEFT FOOT (HCC): Primary | ICD-10-CM

## 2024-11-15 DIAGNOSIS — K59.00 CONSTIPATION, UNSPECIFIED CONSTIPATION TYPE: ICD-10-CM

## 2024-11-15 DIAGNOSIS — E11.628 DIABETIC INFECTION OF LEFT FOOT (HCC): Primary | ICD-10-CM

## 2024-11-15 DIAGNOSIS — E87.1 HYPONATREMIA: ICD-10-CM

## 2024-11-15 DIAGNOSIS — L03.116 CELLULITIS AND ABSCESS OF LEFT LOWER EXTREMITY: ICD-10-CM

## 2024-11-15 DIAGNOSIS — E11.65 UNCONTROLLED TYPE 2 DIABETES MELLITUS WITH HYPERGLYCEMIA (HCC): ICD-10-CM

## 2024-11-15 DIAGNOSIS — I10 PRIMARY HYPERTENSION: ICD-10-CM

## 2024-11-15 DIAGNOSIS — L02.416 CELLULITIS AND ABSCESS OF LEFT LOWER EXTREMITY: ICD-10-CM

## 2024-11-15 PROCEDURE — 3008F BODY MASS INDEX DOCD: CPT | Performed by: NURSE PRACTITIONER

## 2024-11-15 PROCEDURE — 3079F DIAST BP 80-89 MM HG: CPT | Performed by: NURSE PRACTITIONER

## 2024-11-15 PROCEDURE — 3077F SYST BP >= 140 MM HG: CPT | Performed by: NURSE PRACTITIONER

## 2024-11-15 PROCEDURE — 99495 TRANSJ CARE MGMT MOD F2F 14D: CPT | Performed by: NURSE PRACTITIONER

## 2024-11-15 RX ORDER — CHLORAL HYDRATE 500 MG
1000 CAPSULE ORAL DAILY
COMMUNITY

## 2024-11-15 RX ORDER — MONTELUKAST SODIUM 10 MG/1
10 TABLET ORAL NIGHTLY
COMMUNITY

## 2024-11-15 NOTE — PATIENT INSTRUCTIONS
Follow up with Dr. Willingham on November 19th, at 10:45am  Sara Willingham MD  [address] [place] [id number]  [phone]

## 2024-11-15 NOTE — PROGRESS NOTES
TRANSITIONAL CARE CLINIC PHARMACIST MEDICATION RECONCILIATION        Farzad Romo MRN [id number]    1975 PCP Lloyd Guaman MD       Comments: Medication history completed by the Transitional Care Clinic Pharmacist with the patient in the office.     The following medication changes occurred while patient was hospitalized:  Medications Started:  Ceftriaxone 2gm IVPB - Inject 2gm into the vein daily (therapy to complete on 24)  Metronidazole 500mg tabs - 1 tablet two times daily with meals (therapy to complete on 12/10/24)    Outpatient Encounter Medications as of 11/15/2024   Medication Sig    ATORVASTATIN 20 MG Oral Tab TAKE 1 TABLET(20 MG) BY MOUTH EVERY NIGHT    metRONIDAZOLE 500 MG Oral Tab Take 1 tablet (500 mg total) by mouth 2 (two) times daily with meals for 28 days.    cefTRIAXone in dextrose 5% (ROCEPHIN) IVPB premix Inject 50 mL (2 g total) into the vein daily. Will need weekly labs with CBC, BMP, ESR and CRP while on this medication.    OZEMPIC, 0.25 OR 0.5 MG/DOSE, 2 MG/3ML Subcutaneous Solution Pen-injector INJECT 0.5 MG UNDER THE SKIN ONCE A WEEK    amLODIPine 5 MG Oral Tab Take 1 tablet (5 mg total) by mouth 2 (two) times daily. Ok to take 1-2 tablets if BP average is over 150/90    LOSARTAN 100 MG Oral Tab TAKE 1 TABLET(100 MG) BY MOUTH DAILY    FREESTYLE SUNNY 3 SENSOR Does not apply Misc 1 each every 14 (fourteen) days.    Glucose Blood (CONTOUR NEXT TEST) In Vitro Strip 1 each by Other route 3 (three) times daily. (Patient not taking: Reported on 11/15/2024)     Medication Adherence Assessment:   Patient reports taking the Ceftriaxone and Metronidazole as prescribed.  Denies any upset stomach or diarrhea.  Counseled the patient on completing the full course of the antibiotics.  Discussed avoiding alcohol with the Metronidazole.  Patient has follow-up with ID next week.  Patient asking how to aspirate the port.  APRN to discuss further.    Patient brought all medications to the  appointment.  Reports taking the Amlodipine once or twice daily depending on his blood pressure.  States he tries to check the blood pressure every day.  Patient reports only taking Ozempic for his blood sugars every week.  States he has the Semglee insulin and Metformin at home, and will take it if the blood sugars get elevated, but the last time he tried to take Metformin (about 2 weeks ago) he developed a lot of GI/diarrhea so does not want to take that anymore.  Patient reports he just put the Freestyle sensor on this morning and blood sugar in office was 141.  Says he had not been checking the blood sugar since discharge.  Counseled the patient on controlling the blood sugars to help fight the infection, and that the infection could cause blood sugars to be erratic.  APRN to assess further.     Reviewed all medications in detail with patient including dose, indication, timing of administration, monitoring parameters, and potential side effects of medications.   Patient confirmed understanding.     Thank you,    Gwen Kumar, PharmD, 11/15/2024, 10:31 AM  Transitional Care Clinic

## 2024-11-19 ENCOUNTER — OFFICE VISIT (OUTPATIENT)
Facility: LOCATION | Age: 49
End: 2024-11-19
Payer: COMMERCIAL

## 2024-11-19 DIAGNOSIS — L08.9 DIABETIC INFECTION OF LEFT FOOT (HCC): Primary | ICD-10-CM

## 2024-11-19 DIAGNOSIS — L02.416 CELLULITIS AND ABSCESS OF LEFT LOWER EXTREMITY: ICD-10-CM

## 2024-11-19 DIAGNOSIS — E11.628 DIABETIC INFECTION OF LEFT FOOT (HCC): Primary | ICD-10-CM

## 2024-11-19 DIAGNOSIS — M86.172 OTHER ACUTE OSTEOMYELITIS OF LEFT FOOT (HCC): ICD-10-CM

## 2024-11-19 DIAGNOSIS — Z98.890 POST-OPERATIVE STATE: ICD-10-CM

## 2024-11-19 DIAGNOSIS — L03.116 CELLULITIS AND ABSCESS OF LEFT LOWER EXTREMITY: ICD-10-CM

## 2024-11-19 DIAGNOSIS — E11.65 UNCONTROLLED TYPE 2 DIABETES MELLITUS WITH HYPERGLYCEMIA (HCC): ICD-10-CM

## 2024-11-19 PROCEDURE — 99024 POSTOP FOLLOW-UP VISIT: CPT

## 2024-11-19 NOTE — PROGRESS NOTES
[facility]  Progress Note  11/19/2024    Chief Complaint   Patient presents with    Post-Op     1st POV surgery on 11/9/24, Left foot incision and drainage with excision of tibial sesamoid. Patient is in a surgical shoe. He denies any pain, numbness, tingling, fever or chills.         HPI:     Pt is a pleasant 49 year old male who presents for first post-op visit. Pt had Incision and Drainage of left foot abscess, excision of tibial sesamoid of left foot completed by  Dr Arreola on 11/9/2024. Pt has dressings intact and is without pain at this time. He has been heel walking in postop shoe. Pt appears to be doing very well. No other complaints are mentioned. Denies complaints of nausea, vomiting, fever, chills, or calf pain.  Pt states that he is  averaging 189 (over past 4 days) but states in the hospital was averaging 150. Pt currently taking ozempic and metformin.      Allergies: [place] and Seasonal   Current Outpatient Medications   Medication Sig Dispense Refill    montelukast 10 MG Oral Tab Take 1 tablet (10 mg total) by mouth nightly.      omega-3 fatty acids 1000 MG Oral Cap Take 1,000 mg by mouth daily.      ATORVASTATIN 20 MG Oral Tab TAKE 1 TABLET(20 MG) BY MOUTH EVERY NIGHT 30 tablet 5    metRONIDAZOLE 500 MG Oral Tab Take 1 tablet (500 mg total) by mouth 2 (two) times daily with meals for 28 days. 56 tablet 0    cefTRIAXone in dextrose 5% (ROCEPHIN) IVPB premix Inject 50 mL (2 g total) into the vein daily. Will need weekly labs with CBC, BMP, ESR and CRP while on this medication. 2050 mL 0    OZEMPIC, 0.25 OR 0.5 MG/DOSE, 2 MG/3ML Subcutaneous Solution Pen-injector INJECT 0.5 MG UNDER THE SKIN ONCE A WEEK 3 mL 0    amLODIPine 5 MG Oral Tab Take 1 tablet (5 mg total) by mouth 2 (two) times daily. Ok to take 1-2 tablets if BP average is over 150/90 60 tablet 5    LOSARTAN 100 MG Oral Tab TAKE 1 TABLET(100 MG) BY MOUTH DAILY 90 tablet 0    FREESTYLE SUNNY 3 SENSOR Does not apply Misc 1 each  every 14 (fourteen) days. 2 each 11    Glucose Blood (CONTOUR NEXT TEST) In Vitro Strip 1 each by Other route 3 (three) times daily. 100 strip 5      Past Medical History:    Essential hypertension    High blood pressure    Hyperlipidemia    Type II or unspecified type diabetes mellitus without mention of complication, not stated as uncontrolled    Visual impairment      Past Surgical History:   Procedure Laterality Date    Appendectomy  10 yrs ago      Family History   Problem Relation Age of Onset    Hypertension Father     Diabetes Father     Hypertension Mother     Diabetes Mother     Diabetes Sister     Diabetes Sister     Diabetes Brother     Dementia Maternal Aunt     Dementia Maternal Aunt     Dementia Maternal Aunt       Social History     Socioeconomic History    Marital status: Single   Tobacco Use    Smoking status: Never     Passive exposure: Never    Smokeless tobacco: Never   Vaping Use    Vaping status: Never Used   Substance and Sexual Activity    Alcohol use: Not Currently     Comment: rarely    Drug use: No   Other Topics Concern    Caffeine Concern No     Comment: very little    Exercise Yes     Comment: 4x per week    Seat Belt Yes    Special Diet No    Stress Concern Yes    Weight Concern No           REVIEW OF SYSTEMS:     10 point ROS completed and was negative, except for pertinent positive and negatives stated in subjective.       EXAM:     GENERAL: well developed, well nourished, in no apparent distress    EXTREMITIES:  1. Integument: Normal skin temperature and turgor. Incision is well coapted with sutures intact. No dehiscence or drainage. Areas of hyperkeratosis around incision site.    2. Vascular: Dorsalis pedis 2/4 bilateral and posterior tibial pulses 2/4 bilateral, capillary refill normal. Minimal edema, consistent with post op course.  3. Neurological: Normal sharp dull sensation; reflexes normal.  4. Musculoskeletal: All muscle groups are graded 5/5 in the foot and  ankle.     ASSESSMENT AND PLAN:   There are no diagnoses linked to this encounter.    Plan:   -Patient was seen and evaluated today in clinic.  Chart history reviewed.    -Status post Incision and Drainage of left foot abscess, excision of tibial sesamoid of left foot (DOS: 11/9/2024).    -Site inspected.  Noted to be healing well. Areas of hpk debrided.   -Site redressed with Betadine, dry dressing, mildly compressive Ace wrap. Please keep dressings clean, dry, and intact until next follow-up visit.  -Pt to remain non-weight bearing at this time. Instructed patient that he should be using his knee-scooter and that he should still try to elevate and rest his left foot as much as possible.    -Can take ibuprofen or tylenol as needed for pain.  -Educated patient on acute signs of infection advised patient to seek immediate medical attention if any concerns arise.    Post-op Left foot tibial sesamoid, excision pathology shows:  -Portion of bone with acute osteomyelitis.  -Periosteal tissue with necrosis and acute inflammation.  -Pt continues to take oral antibiotics and receive IV antibiotics at home as prescribed by ID.    - In regards to blood sugars, reviewed and discussed adherence to medication regimen, close monitoring of blood sugar control, and proper management of diet.     -All of the patient's questions and concerns were addressed.  He indicated understanding of these issues and agrees to the plan.    Time spent reviewing pertinent information from patient's chart, reviewing any pertinent imaging, obtaining history and physical exam, discussing and mutually agreeing on a treatment plan, and documenting encounter: 30 minutes      RTC 1 week for suture removal or sooner if other concerns arise.    CONNOR Campos        [facility]            Dragon speech recognition software was used to prepare this note.  Errors in word recognition may occur.  Please contact me with any  questions/concerns with this note.

## 2024-11-26 ENCOUNTER — OFFICE VISIT (OUTPATIENT)
Facility: LOCATION | Age: 49
End: 2024-11-26
Payer: COMMERCIAL

## 2024-11-26 DIAGNOSIS — E11.65 UNCONTROLLED TYPE 2 DIABETES MELLITUS WITH HYPERGLYCEMIA (HCC): ICD-10-CM

## 2024-11-26 DIAGNOSIS — Z98.890 POST-OPERATIVE STATE: Primary | ICD-10-CM

## 2024-11-26 DIAGNOSIS — L08.9 DIABETIC INFECTION OF LEFT FOOT (HCC): ICD-10-CM

## 2024-11-26 DIAGNOSIS — E11.628 DIABETIC INFECTION OF LEFT FOOT (HCC): ICD-10-CM

## 2024-11-26 DIAGNOSIS — M86.172 OTHER ACUTE OSTEOMYELITIS OF LEFT FOOT (HCC): ICD-10-CM

## 2024-11-26 PROCEDURE — 99024 POSTOP FOLLOW-UP VISIT: CPT

## 2024-11-26 NOTE — PROGRESS NOTES
[facility]  Progress Note  11/26/2024    Chief Complaint   Patient presents with    Post-Op     2nd POV; Left foot incision and drainage with excision of tibial sesamoid, surgery was 11/9/24. Patient is in a surgical shoe. Denies any pain in the office today.         HPI:     Pt is a pleasant 49 year old male who presents for second post-op visit. Pt had Incision and Drainage of left foot abscess, excision of tibial sesamoid of left foot completed by Dr Arreola on 11/9/2024. Pt has dressings intact and is without pain. Patient is heel walking in postop shoe- discussed at last OV for patient to be non-weightbearing and to use knee scooter. Re-educated patient on being non-weightbearing to ensure proper healing of surgical wound.  Denies complaints of nausea, vomiting, fever, chills, or calf pain.  Pt states that his blood sugars are averaging 150. Pt currently taking ozempic and metformin. Pt continues IV and oral antibiotics per ID.   Pt appears to be doing very well. No other complaints are mentioned.      Allergies: [place] and Seasonal   Current Outpatient Medications   Medication Sig Dispense Refill    montelukast 10 MG Oral Tab Take 1 tablet (10 mg total) by mouth nightly.      omega-3 fatty acids 1000 MG Oral Cap Take 1,000 mg by mouth daily.      ATORVASTATIN 20 MG Oral Tab TAKE 1 TABLET(20 MG) BY MOUTH EVERY NIGHT 30 tablet 5    metRONIDAZOLE 500 MG Oral Tab Take 1 tablet (500 mg total) by mouth 2 (two) times daily with meals for 28 days. 56 tablet 0    cefTRIAXone in dextrose 5% (ROCEPHIN) IVPB premix Inject 50 mL (2 g total) into the vein daily. Will need weekly labs with CBC, BMP, ESR and CRP while on this medication. 2050 mL 0    OZEMPIC, 0.25 OR 0.5 MG/DOSE, 2 MG/3ML Subcutaneous Solution Pen-injector INJECT 0.5 MG UNDER THE SKIN ONCE A WEEK 3 mL 0    amLODIPine 5 MG Oral Tab Take 1 tablet (5 mg total) by mouth 2 (two) times daily. Ok to take 1-2 tablets if BP average is over 150/90 60 tablet  5    LOSARTAN 100 MG Oral Tab TAKE 1 TABLET(100 MG) BY MOUTH DAILY 90 tablet 0    FREESTYLE SUNNY 3 SENSOR Does not apply Misc 1 each every 14 (fourteen) days. 2 each 11    Glucose Blood (CONTOUR NEXT TEST) In Vitro Strip 1 each by Other route 3 (three) times daily. 100 strip 5      Past Medical History:    Essential hypertension    High blood pressure    Hyperlipidemia    Type II or unspecified type diabetes mellitus without mention of complication, not stated as uncontrolled    Visual impairment      Past Surgical History:   Procedure Laterality Date    Appendectomy  10 yrs ago      Family History   Problem Relation Age of Onset    Hypertension Father     Diabetes Father     Hypertension Mother     Diabetes Mother     Diabetes Sister     Diabetes Sister     Diabetes Brother     Dementia Maternal Aunt     Dementia Maternal Aunt     Dementia Maternal Aunt       Social History     Socioeconomic History    Marital status: Single   Tobacco Use    Smoking status: Never     Passive exposure: Never    Smokeless tobacco: Never   Vaping Use    Vaping status: Never Used   Substance and Sexual Activity    Alcohol use: Not Currently     Comment: rarely    Drug use: No   Other Topics Concern    Caffeine Concern No     Comment: very little    Exercise Yes     Comment: 4x per week    Seat Belt Yes    Special Diet No    Stress Concern Yes    Weight Concern No           REVIEW OF SYSTEMS:     10 point ROS completed and was negative, except for pertinent positive and negatives stated in subjective.       EXAM:     GENERAL: well developed, well nourished, in no apparent distress    EXTREMITIES:  1. Integument: Normal skin temperature and turgor. Sutures removed. HPK to wound site debrided with #15 blade scalpel to reveal incision has superficial gapping. No drainage or other concerns.     2. Vascular: Pedal pulses palpable. Minimal edema, consistent with post op course.  3. Neurological: Pt has dimished protective sensation to  bilateral lower extremities  4. Musculoskeletal: Compartments are soft and compressible.     ASSESSMENT AND PLAN:   There are no diagnoses linked to this encounter.    Plan:   -Patient was seen and evaluated today in clinic.  Chart history reviewed.  -Status post  Incision and Drainage of left foot abscess, excision of tibial sesamoid of left foot  (DOS: 11/09/2024).  -Site inspected.  Noted to be healing well without concerns at this time. Steri-strips applied to incision, dressed with betadine soaked gauze, 4x4's, kerlix, mildly compressive Ace wrap. Please keep dressings clean, dry, and intact until next follow-up visit.  -Pt to be non-weightbearing, Discussed with patient that he should avoid any pressure to Right foot including heel walking in post-op shoe. Patient to use knee scooter.  Should still try to elevate and rest as much as possible.    -Educated patient on acute signs of infection advised patient to seek immediate medical attention if any concerns arise.  -All of the patient's questions and concerns were addressed.  He indicates understanding of these issues and agrees to the plan.    Time spent reviewing pertinent information from patient's chart, reviewing any pertinent imaging, obtaining history and physical exam, discussing and mutually agreeing on a treatment plan, and documenting encounter: 30 minutes    RTC 1 week or sooner if other concerns arise.    CONNOR Campos        [facility]            Dragon speech recognition software was used to prepare this note.  Errors in word recognition may occur.  Please contact me with any questions/concerns with this note.

## 2024-11-29 DIAGNOSIS — Z79.4 TYPE 2 DIABETES MELLITUS WITH HYPERGLYCEMIA, WITH LONG-TERM CURRENT USE OF INSULIN (HCC): ICD-10-CM

## 2024-11-29 DIAGNOSIS — E11.65 TYPE 2 DIABETES MELLITUS WITH HYPERGLYCEMIA, WITH LONG-TERM CURRENT USE OF INSULIN (HCC): ICD-10-CM

## 2024-11-29 RX ORDER — SEMAGLUTIDE 0.68 MG/ML
0.5 INJECTION, SOLUTION SUBCUTANEOUS WEEKLY
Qty: 3 ML | Refills: 0 | Status: SHIPPED | OUTPATIENT
Start: 2024-11-29

## 2024-11-29 NOTE — TELEPHONE ENCOUNTER
Requested Prescriptions     Pending Prescriptions Disp Refills    OZEMPIC, 0.25 OR 0.5 MG/DOSE, 2 MG/3ML Subcutaneous Solution Pen-injector [Pharmacy Med Name: OZEMPIC 0.25 OR 0.5MG DOS(2MG/3ML)] 3 mL 0     Sig: INJECT 0.5 MG UNDER THE SKIN ONCE A WEEK     HGBA1C:    Lab Results   Component Value Date    A1C 7.9 (H) 10/11/2024    A1C 8.1 (H) 05/20/2024    A1C 11.7 (H) 01/25/2024     (H) 10/11/2024     Your Appointments      Tuesday December 03, 2024 1:00 PM  Post Op Visit with William Pearson [address] ([facility]) [address] [place] [id number] [phone]        Monday December 23, 2024 2:15 PM  Diabetes Pump follow up with KARMEN Renteria  [address] ([facility] [place]) [address] [id number] [phone]             Last OFFICE VISIT: 8-9-2024-    Last refill:  11-7-2024-3 ml with 0 refills-

## 2024-12-01 DIAGNOSIS — I10 PRIMARY HYPERTENSION: ICD-10-CM

## 2024-12-02 RX ORDER — LOSARTAN POTASSIUM 100 MG/1
100 TABLET ORAL DAILY
Qty: 90 TABLET | Refills: 1 | Status: SHIPPED | OUTPATIENT
Start: 2024-12-02

## 2024-12-03 ENCOUNTER — OFFICE VISIT (OUTPATIENT)
Facility: LOCATION | Age: 49
End: 2024-12-03
Payer: COMMERCIAL

## 2024-12-03 DIAGNOSIS — L08.9 DIABETIC INFECTION OF LEFT FOOT (HCC): ICD-10-CM

## 2024-12-03 DIAGNOSIS — E11.65 UNCONTROLLED TYPE 2 DIABETES MELLITUS WITH HYPERGLYCEMIA (HCC): ICD-10-CM

## 2024-12-03 DIAGNOSIS — E11.628 DIABETIC INFECTION OF LEFT FOOT (HCC): ICD-10-CM

## 2024-12-03 DIAGNOSIS — Z98.890 POST-OPERATIVE STATE: Primary | ICD-10-CM

## 2024-12-03 DIAGNOSIS — M86.172 OTHER ACUTE OSTEOMYELITIS OF LEFT FOOT (HCC): ICD-10-CM

## 2024-12-03 PROCEDURE — 99024 POSTOP FOLLOW-UP VISIT: CPT

## 2024-12-03 NOTE — PROGRESS NOTES
[facility]  Progress Note  12/3/2024    Post-op visit.        HPI:     Pt is a pleasant 49 year old male who presents for third post-op visit. Pt had Incision and Drainage of left foot abscess, excision of tibial sesamoid of left foot completed by Dr Arreola on 11/9/2024. Pt has dressings intact and is without pain. Patient is heel walking in postop shoe and is utilizing knee scooter for long distances. Denies complaints of nausea, vomiting, fever, chills, or calf pain.  Pt states that his blood sugars have been controlled. Pt continues IV and oral antibiotics per ID.   Pt appears to be doing very well. No other complaints are mentioned.      Allergies: [place] and Seasonal   Current Outpatient Medications   Medication Sig Dispense Refill    losartan 100 MG Oral Tab TAKE 1 TABLET(100 MG) BY MOUTH DAILY 90 tablet 1    OZEMPIC, 0.25 OR 0.5 MG/DOSE, 2 MG/3ML Subcutaneous Solution Pen-injector INJECT 0.5 MG UNDER THE SKIN ONCE A WEEK 3 mL 0    montelukast 10 MG Oral Tab Take 1 tablet (10 mg total) by mouth nightly.      omega-3 fatty acids 1000 MG Oral Cap Take 1,000 mg by mouth daily.      ATORVASTATIN 20 MG Oral Tab TAKE 1 TABLET(20 MG) BY MOUTH EVERY NIGHT 30 tablet 5    metRONIDAZOLE 500 MG Oral Tab Take 1 tablet (500 mg total) by mouth 2 (two) times daily with meals for 28 days. 56 tablet 0    cefTRIAXone in dextrose 5% (ROCEPHIN) IVPB premix Inject 50 mL (2 g total) into the vein daily. Will need weekly labs with CBC, BMP, ESR and CRP while on this medication. 2050 mL 0    amLODIPine 5 MG Oral Tab Take 1 tablet (5 mg total) by mouth 2 (two) times daily. Ok to take 1-2 tablets if BP average is over 150/90 60 tablet 5    FREESTYLE SUNNY 3 SENSOR Does not apply Misc 1 each every 14 (fourteen) days. 2 each 11    Glucose Blood (CONTOUR NEXT TEST) In Vitro Strip 1 each by Other route 3 (three) times daily. 100 strip 5      Past Medical History:    Essential hypertension    High blood pressure     Hyperlipidemia    Type II or unspecified type diabetes mellitus without mention of complication, not stated as uncontrolled    Visual impairment      Past Surgical History:   Procedure Laterality Date    Appendectomy  10 yrs ago      Family History   Problem Relation Age of Onset    Hypertension Father     Diabetes Father     Hypertension Mother     Diabetes Mother     Diabetes Sister     Diabetes Sister     Diabetes Brother     Dementia Maternal Aunt     Dementia Maternal Aunt     Dementia Maternal Aunt       Social History     Socioeconomic History    Marital status: Single   Tobacco Use    Smoking status: Never     Passive exposure: Never    Smokeless tobacco: Never   Vaping Use    Vaping status: Never Used   Substance and Sexual Activity    Alcohol use: Not Currently     Comment: rarely    Drug use: No   Other Topics Concern    Caffeine Concern No     Comment: very little    Exercise Yes     Comment: 4x per week    Seat Belt Yes    Special Diet No    Stress Concern Yes    Weight Concern No           REVIEW OF SYSTEMS:     10 point ROS completed and was negative, except for pertinent positive and negatives stated in subjective.       EXAM:     GENERAL: well developed, well nourished, in no apparent distress    EXTREMITIES:  1. Integument: Normal skin temperature and turgor. Incision is mostly coapted with small superficial gaping. No dehiscence, drainage, or other concerns.         2. Vascular: Pedal pulses palpable. Minimal edema, consistent with post op course.  3. Neurological: Pt has dimished protective sensation to bilateral lower extremities  4. Musculoskeletal: Compartments are soft and compressible.     ASSESSMENT AND PLAN:   Diagnoses and all orders for this visit:    Post-operative state    Uncontrolled type 2 diabetes mellitus with hyperglycemia (HCC)    Diabetic infection of left foot (HCC)    Other acute osteomyelitis of left foot (HCC)        Plan:   -Patient was seen and evaluated today in clinic.   Chart history reviewed.  -Status post  Incision and Drainage of left foot abscess, excision of tibial sesamoid of left foot  (DOS: 11/09/2024).  -Site inspected.  Noted to be healing well without concerns at this time. - Areas of HPK debrided with 15 blade scalpel without incident. Open area dressed with Puracol Plus collagen and steri- strip, painted with betadine and covered with band-aid. Pt to keep dressing clean, dry, and intact until next follow-up visit.  -Pt can be weight-bearing to heel only. Should still continue to try to elevate and rest as much as possible.    -Educated patient on acute signs of infection advised patient to seek immediate medical attention if any concerns arise.  -All of the patient's questions and concerns were addressed.  He indicates understanding of these issues and agrees to the plan.     Time spent reviewing pertinent information from patient's chart, reviewing any pertinent imaging, obtaining history and physical exam, discussing and mutually agreeing on a treatment plan, and documenting encounter: 30 minutes     RTC 1 week or sooner if other concerns arise.       CONNOR Campos        [facility]            Dragon speech recognition software was used to prepare this note.  Errors in word recognition may occur.  Please contact me with any questions/concerns with this note.

## 2024-12-10 ENCOUNTER — PATIENT MESSAGE (OUTPATIENT)
Dept: INTERNAL MEDICINE CLINIC | Facility: CLINIC | Age: 49
End: 2024-12-10

## 2024-12-10 ENCOUNTER — OFFICE VISIT (OUTPATIENT)
Facility: LOCATION | Age: 49
End: 2024-12-10
Payer: COMMERCIAL

## 2024-12-10 DIAGNOSIS — M86.172 OTHER ACUTE OSTEOMYELITIS OF LEFT FOOT (HCC): ICD-10-CM

## 2024-12-10 DIAGNOSIS — Z98.890 POST-OPERATIVE STATE: Primary | ICD-10-CM

## 2024-12-10 DIAGNOSIS — L02.416 CELLULITIS AND ABSCESS OF LEFT LOWER EXTREMITY: ICD-10-CM

## 2024-12-10 DIAGNOSIS — E11.628 DIABETIC INFECTION OF LEFT FOOT (HCC): ICD-10-CM

## 2024-12-10 DIAGNOSIS — E11.65 UNCONTROLLED TYPE 2 DIABETES MELLITUS WITH HYPERGLYCEMIA (HCC): ICD-10-CM

## 2024-12-10 DIAGNOSIS — L08.9 DIABETIC INFECTION OF LEFT FOOT (HCC): ICD-10-CM

## 2024-12-10 DIAGNOSIS — L03.116 CELLULITIS AND ABSCESS OF LEFT LOWER EXTREMITY: ICD-10-CM

## 2024-12-10 PROCEDURE — 99024 POSTOP FOLLOW-UP VISIT: CPT

## 2024-12-10 NOTE — PROGRESS NOTES
[facility]  Progress Note  12/3/2024    Post-op visit.        HPI:     Pt is a pleasant 49 year old male who presents for his fourth post-op visit. Pt had Incision and Drainage of left foot abscess, excision of tibial sesamoid of left foot completed by Dr Arreola on 11/9/2024. Pt has dressings intact and is without pain. Patient is walking in postop shoe and is utilizing knee scooter for long distances. Denies complaints of nausea, vomiting, fever, chills, or calf pain.  Pt states that his blood sugars have been controlled. Pt continues IV and oral antibiotics per ID.   Pt appears to be doing very well. No other complaints are mentioned.      Allergies: [place] and Seasonal   Current Outpatient Medications   Medication Sig Dispense Refill    losartan 100 MG Oral Tab TAKE 1 TABLET(100 MG) BY MOUTH DAILY 90 tablet 1    OZEMPIC, 0.25 OR 0.5 MG/DOSE, 2 MG/3ML Subcutaneous Solution Pen-injector INJECT 0.5 MG UNDER THE SKIN ONCE A WEEK 3 mL 0    montelukast 10 MG Oral Tab Take 1 tablet (10 mg total) by mouth nightly.      omega-3 fatty acids 1000 MG Oral Cap Take 1,000 mg by mouth daily.      ATORVASTATIN 20 MG Oral Tab TAKE 1 TABLET(20 MG) BY MOUTH EVERY NIGHT 30 tablet 5    metRONIDAZOLE 500 MG Oral Tab Take 1 tablet (500 mg total) by mouth 2 (two) times daily with meals for 28 days. 56 tablet 0    cefTRIAXone in dextrose 5% (ROCEPHIN) IVPB premix Inject 50 mL (2 g total) into the vein daily. Will need weekly labs with CBC, BMP, ESR and CRP while on this medication. 2050 mL 0    amLODIPine 5 MG Oral Tab Take 1 tablet (5 mg total) by mouth 2 (two) times daily. Ok to take 1-2 tablets if BP average is over 150/90 60 tablet 5    FREESTYLE SUNNY 3 SENSOR Does not apply Misc 1 each every 14 (fourteen) days. 2 each 11    Glucose Blood (CONTOUR NEXT TEST) In Vitro Strip 1 each by Other route 3 (three) times daily. 100 strip 5      Past Medical History:    Essential hypertension    High blood pressure     Hyperlipidemia    Type II or unspecified type diabetes mellitus without mention of complication, not stated as uncontrolled    Visual impairment      Past Surgical History:   Procedure Laterality Date    Appendectomy  10 yrs ago      Family History   Problem Relation Age of Onset    Hypertension Father     Diabetes Father     Hypertension Mother     Diabetes Mother     Diabetes Sister     Diabetes Sister     Diabetes Brother     Dementia Maternal Aunt     Dementia Maternal Aunt     Dementia Maternal Aunt       Social History     Socioeconomic History    Marital status: Single   Tobacco Use    Smoking status: Never     Passive exposure: Never    Smokeless tobacco: Never   Vaping Use    Vaping status: Never Used   Substance and Sexual Activity    Alcohol use: Not Currently     Comment: rarely    Drug use: No   Other Topics Concern    Caffeine Concern No     Comment: very little    Exercise Yes     Comment: 4x per week    Seat Belt Yes    Special Diet No    Stress Concern Yes    Weight Concern No           REVIEW OF SYSTEMS:     10 point ROS completed and was negative, except for pertinent positive and negatives stated in subjective.       EXAM:     GENERAL: well developed, well nourished, in no apparent distress    EXTREMITIES:  1. Integument: Normal skin temperature and turgor. Incision is mostly coapted with small superficial gaping with hypergranulation tissue. No dehiscence, drainage, or other concerns.     2. Vascular: Pedal pulses palpable. Minimal edema, consistent with post op course.  3. Neurological: Pt has dimished protective sensation to bilateral lower extremities  4. Musculoskeletal: Compartments are soft and compressible.     ASSESSMENT AND PLAN:   There are no diagnoses linked to this encounter.      Plan:   -Patient was seen and evaluated today in clinic.  Chart history reviewed.  -Status post  Incision and Drainage of left foot abscess, excision of tibial sesamoid of left foot  (DOS:  11/09/2024).  -Site inspected.  Noted to be healing well without signs of infection  - Areas of HPK debrided with 15 blade scalpel.  Hypergranulation tissue is debrided with a curette to healthy bleeding tissue.  Area is painted with betadine and covered with band-aid. Pt to do similar daily dressings at home.    -Pt should continue to be weightbearing to heel only in postop shoe.  Patient should still continue to try to elevate and rest as much as possible.    -Educated patient on acute signs of infection advised patient to seek immediate medical attention if any concerns arise.  -All of the patient's questions and concerns were addressed.  He indicates understanding of these issues and agrees to the plan.     Time spent reviewing pertinent information from patient's chart, reviewing any pertinent imaging, obtaining history and physical exam, discussing and mutually agreeing on a treatment plan, and documenting encounter: 30 minutes     RTC 1 week for wound check/further wound debridement.  Patient should return sooner if other concerns arise.       CONNOR Campos        [facility]            Dragon speech recognition software was used to prepare this note.  Errors in word recognition may occur.  Please contact me with any questions/concerns with this note.

## 2024-12-11 DIAGNOSIS — E11.65 UNCONTROLLED TYPE 2 DIABETES MELLITUS WITH HYPERGLYCEMIA (HCC): ICD-10-CM

## 2024-12-11 NOTE — TELEPHONE ENCOUNTER
The original prescription was discontinued on 11/7/2024 by Maricel Douglas, CLAUDINE. Renewing this prescription may not be appropriate.       LOV- 10/18/2024    Medication is not on med list please advise

## 2024-12-17 ENCOUNTER — MED REC SCAN ONLY (OUTPATIENT)
Dept: FAMILY MEDICINE CLINIC | Facility: CLINIC | Age: 49
End: 2024-12-17

## 2024-12-17 ENCOUNTER — OFFICE VISIT (OUTPATIENT)
Facility: LOCATION | Age: 49
End: 2024-12-17
Payer: COMMERCIAL

## 2024-12-17 DIAGNOSIS — M86.172 OTHER ACUTE OSTEOMYELITIS OF LEFT FOOT (HCC): ICD-10-CM

## 2024-12-17 DIAGNOSIS — Z98.890 POST-OPERATIVE STATE: Primary | ICD-10-CM

## 2024-12-17 DIAGNOSIS — E11.628 DIABETIC INFECTION OF LEFT FOOT (HCC): ICD-10-CM

## 2024-12-17 DIAGNOSIS — L08.9 DIABETIC INFECTION OF LEFT FOOT (HCC): ICD-10-CM

## 2024-12-17 DIAGNOSIS — E11.65 UNCONTROLLED TYPE 2 DIABETES MELLITUS WITH HYPERGLYCEMIA (HCC): ICD-10-CM

## 2024-12-17 PROCEDURE — 99024 POSTOP FOLLOW-UP VISIT: CPT

## 2024-12-18 NOTE — PROGRESS NOTES
[facility]  Progress Note  12/3/2024    Post-op visit.        HPI:     Pt is a pleasant 49 year old male who presents for his fifth post-op visit. Pt had Incision and Drainage of left foot abscess, excision of tibial sesamoid of left foot completed by Dr Arreola on 11/9/2024. Pt has dressings intact and is without pain.  Patient has been dressing area with Betadine and a Band-Aid.  Patient is walking in a postop shoe. Denies complaints of nausea, vomiting, fever, chills, or calf pain.  Pt states that his blood sugars have been controlled. Pt appears to be doing very well. No other complaints are mentioned.      Allergies: [place] and Seasonal   Current Outpatient Medications   Medication Sig Dispense Refill    METFORMIN HCL 1000 MG Oral Tab TAKE 1 TABLET(1000 MG) BY MOUTH TWICE DAILY 180 tablet 1    losartan 100 MG Oral Tab TAKE 1 TABLET(100 MG) BY MOUTH DAILY 90 tablet 1    OZEMPIC, 0.25 OR 0.5 MG/DOSE, 2 MG/3ML Subcutaneous Solution Pen-injector INJECT 0.5 MG UNDER THE SKIN ONCE A WEEK 3 mL 0    montelukast 10 MG Oral Tab Take 1 tablet (10 mg total) by mouth nightly.      omega-3 fatty acids 1000 MG Oral Cap Take 1,000 mg by mouth daily.      ATORVASTATIN 20 MG Oral Tab TAKE 1 TABLET(20 MG) BY MOUTH EVERY NIGHT 30 tablet 5    cefTRIAXone in dextrose 5% (ROCEPHIN) IVPB premix Inject 50 mL (2 g total) into the vein daily. Will need weekly labs with CBC, BMP, ESR and CRP while on this medication. 2050 mL 0    amLODIPine 5 MG Oral Tab Take 1 tablet (5 mg total) by mouth 2 (two) times daily. Ok to take 1-2 tablets if BP average is over 150/90 60 tablet 5    FREESTYLE SUNNY 3 SENSOR Does not apply Misc 1 each every 14 (fourteen) days. 2 each 11    Glucose Blood (CONTOUR NEXT TEST) In Vitro Strip 1 each by Other route 3 (three) times daily. 100 strip 5      Past Medical History:    Essential hypertension    High blood pressure    Hyperlipidemia    Type II or unspecified type diabetes mellitus without mention  of complication, not stated as uncontrolled    Visual impairment      Past Surgical History:   Procedure Laterality Date    Appendectomy  10 yrs ago      Family History   Problem Relation Age of Onset    Hypertension Father     Diabetes Father     Hypertension Mother     Diabetes Mother     Diabetes Sister     Diabetes Sister     Diabetes Brother     Dementia Maternal Aunt     Dementia Maternal Aunt     Dementia Maternal Aunt       Social History     Socioeconomic History    Marital status: Single   Tobacco Use    Smoking status: Never     Passive exposure: Never    Smokeless tobacco: Never   Vaping Use    Vaping status: Never Used   Substance and Sexual Activity    Alcohol use: Not Currently     Comment: rarely    Drug use: No   Other Topics Concern    Caffeine Concern No     Comment: very little    Exercise Yes     Comment: 4x per week    Seat Belt Yes    Special Diet No    Stress Concern Yes    Weight Concern No           REVIEW OF SYSTEMS:     10 point ROS completed and was negative, except for pertinent positive and negatives stated in subjective.       EXAM:     GENERAL: well developed, well nourished, in no apparent distress    EXTREMITIES:  1. Integument: Normal skin temperature and turgor. Incision is mostly coapted with small superficial gaping with hypergranulation tissue. No dehiscence, drainage, or other concerns.     2. Vascular: Pedal pulses palpable. Minimal edema, consistent with post op course.  3. Neurological: Pt has dimished protective sensation to bilateral lower extremities  4. Musculoskeletal: Compartments are soft and compressible.     ASSESSMENT AND PLAN:   There are no diagnoses linked to this encounter.      Plan:   -Patient was seen and evaluated today in clinic.  Chart history reviewed.  -Status post  Incision and Drainage of left foot abscess, excision of tibial sesamoid of left foot  (DOS: 11/09/2024).    -Site inspected.  Noted to be healing well without signs of infection  - Areas  of HPK debrided with 15 blade scalpel.  Hypergranulation tissue is debrided with a scalpel to healthy bleeding tissue.  Silver nitrate is utilized to this area to control bleeding.  Area is painted with betadine and covered with band-aid. Pt to do similar daily dressings at home.  - Patient should still continue to try to elevate and rest as much as possible.    -Educated patient on acute signs of infection and advised patient to seek immediate medical attention if any concerns arise.  -All of the patient's questions and concerns were addressed.  He indicates understanding of these issues and agrees to the plan.     Time spent reviewing pertinent information from patient's chart, reviewing any pertinent imaging, obtaining history and physical exam, discussing and mutually agreeing on a treatment plan, and documenting encounter: 30 minutes     RTC in 2 weeks for wound check/further wound debridement.  Patient should return sooner if other concerns arise.       CONNOR Campos        [facility]            Dragon speech recognition software was used to prepare this note.  Errors in word recognition may occur.  Please contact me with any questions/concerns with this note.

## 2024-12-23 ENCOUNTER — OFFICE VISIT (OUTPATIENT)
Dept: ENDOCRINOLOGY CLINIC | Facility: CLINIC | Age: 49
End: 2024-12-23
Payer: COMMERCIAL

## 2024-12-23 VITALS
HEART RATE: 80 BPM | WEIGHT: 183 LBS | BODY MASS INDEX: 26 KG/M2 | OXYGEN SATURATION: 98 % | SYSTOLIC BLOOD PRESSURE: 120 MMHG | RESPIRATION RATE: 18 BRPM | DIASTOLIC BLOOD PRESSURE: 70 MMHG

## 2024-12-23 DIAGNOSIS — E11.21 MACROALBUMINURIC DIABETIC NEPHROPATHY (HCC): ICD-10-CM

## 2024-12-23 DIAGNOSIS — I10 PRIMARY HYPERTENSION: ICD-10-CM

## 2024-12-23 DIAGNOSIS — Z23 INFLUENZA VACCINE NEEDED: ICD-10-CM

## 2024-12-23 DIAGNOSIS — E11.65 TYPE 2 DIABETES MELLITUS WITH HYPERGLYCEMIA, WITH LONG-TERM CURRENT USE OF INSULIN (HCC): Primary | ICD-10-CM

## 2024-12-23 DIAGNOSIS — E78.2 MIXED HYPERLIPIDEMIA: ICD-10-CM

## 2024-12-23 DIAGNOSIS — Z79.4 TYPE 2 DIABETES MELLITUS WITH HYPERGLYCEMIA, WITH LONG-TERM CURRENT USE OF INSULIN (HCC): Primary | ICD-10-CM

## 2024-12-23 LAB — HEMOGLOBIN A1C: 7 % (ref 4.3–5.6)

## 2024-12-23 RX ORDER — SEMAGLUTIDE 1.34 MG/ML
1 INJECTION, SOLUTION SUBCUTANEOUS WEEKLY
Qty: 9 ML | Refills: 1 | Status: SHIPPED | OUTPATIENT
Start: 2024-12-23

## 2024-12-23 NOTE — PROGRESS NOTES
HPI:   Farzad Romo is a 49 year old male who presents for follow up for the management of his diabetes.     Chief Complaint   Patient presents with    Diabetes     Follow up-[facility]       Diabetes: improved, stable  Type: 2   Duration:dx   Current Meds: Metformin 1000mg po bid, Ozempic 0.5mg injection weekly  SE: denies  Long term insulin use: yes  Failed Meds/Previous Tx: Onglyza, glipizide, Lantus, Glyxambi, Tresiba, Basaglar, Ozempic, Semglee  Complications: Neuropathy  Retinopathy  Proteinuria  Diabetic Ulcers - current postop left foot wound      Personal Freestyle [place] CGM  Analysis of data: 12/10/2024 - 2024  % Time CGM is Active: 60%  Sensor download: full report  in media  Average glucose : 178 mg/dl   GMI: 7.6%    CV (coefficient of variation) : 23.3%     37% time above 180mg /dl   5% time above 250 mg/dl  58% time in target range:  mg/dl   0% time below target range: 70mg/dl     Evaluation   1. Baseline glucose stable but elevated  2. Evening postprandial elevation with return to baseline  3. Low likelihood of hypoglycemia  4. Normal glucose variability         Overall glucose control:   HGBA1C:    Lab Results   Component Value Date    A1C 7.0 (A) 2024    A1C 7.9 (H) 10/11/2024    A1C 8.1 (H) 2024     (H) 10/11/2024       Hypertension: stable, at goal  Blood Pressure: 120/70   Medication prescribed: losartan, amlodipine  SE: denies     Hyperlipidemia: stable, needs further improvement  LDL: 94   Tri  Medication prescribed: atorvastatin  SE: denies     Wt Readings from Last 3 Encounters:   24 183 lb (83 kg)   11/15/24 186 lb (84.4 kg)   24 186 lb 1.1 oz (84.4 kg)     BP Readings from Last 3 Encounters:   24 120/70   11/15/24 140/82   24 148/74          Past History:   He  has a past medical history of Essential hypertension, High blood pressure, Hyperlipidemia, Type II or unspecified type diabetes mellitus without mention of  complication, not stated as uncontrolled, and Visual impairment.   His family history includes Dementia in his maternal aunt, maternal aunt, and maternal aunt; Diabetes in his brother, father, mother, sister, and sister; Hypertension in his father and mother.   He  reports that he has never smoked. He has never been exposed to tobacco smoke. He has never used smokeless tobacco. He reports that he does not currently use alcohol. He reports that he does not use drugs.     He is allergic to almond and seasonal.     Current Outpatient Medications on File Prior to Visit   Medication Sig    METFORMIN HCL 1000 MG Oral Tab TAKE 1 TABLET(1000 MG) BY MOUTH TWICE DAILY    losartan 100 MG Oral Tab TAKE 1 TABLET(100 MG) BY MOUTH DAILY    montelukast 10 MG Oral Tab Take 1 tablet (10 mg total) by mouth nightly.    omega-3 fatty acids 1000 MG Oral Cap Take 1,000 mg by mouth daily.    ATORVASTATIN 20 MG Oral Tab TAKE 1 TABLET(20 MG) BY MOUTH EVERY NIGHT    amLODIPine 5 MG Oral Tab Take 1 tablet (5 mg total) by mouth 2 (two) times daily. Ok to take 1-2 tablets if BP average is over 150/90    FREESTYLE SUNNY 3 SENSOR Does not apply Misc 1 each every 14 (fourteen) days.    Glucose Blood (CONTOUR NEXT TEST) In Vitro Strip 1 each by Other route 3 (three) times daily.    [DISCONTINUED] OZEMPIC, 0.25 OR 0.5 MG/DOSE, 2 MG/3ML Subcutaneous Solution Pen-injector INJECT 0.5 MG UNDER THE SKIN ONCE A WEEK     No current facility-administered medications on file prior to visit.       CMP  (most recent labs)   Lab Results   Component Value Date/Time     (H) 11/10/2024 10:50 AM    BUN 7 (L) 11/10/2024 10:50 AM    CREATSERUM 0.91 11/10/2024 10:50 AM    GFRNAA 112 07/19/2021 11:50 AM    GFRAA 129 07/19/2021 11:50 AM    EGFRCR 102 11/10/2024 10:50 AM    CA 8.9 11/10/2024 10:50 AM    ALKPHO 85 11/08/2024 07:25 AM    AST 11 11/08/2024 07:25 AM    ALT 10 11/08/2024 07:25 AM    BILT 0.7 11/08/2024 07:25 AM    TP 6.9 11/08/2024 07:25 AM    ALB 3.9  11/08/2024 07:25 AM     11/10/2024 10:50 AM    K 3.7 11/10/2024 10:50 AM     11/10/2024 10:50 AM    CO2 32.0 11/10/2024 10:50 AM           Lipids  (most recent labs)   Lab Results   Component Value Date/Time    CHOLEST 158 05/20/2024 09:28 AM    TRIG 63 05/20/2024 09:28 AM    HDL 51 05/20/2024 09:28 AM    LDL 94 05/20/2024 09:28 AM    NONHDLC 107 05/20/2024 09:28 AM          Diabetes  (most recent labs)   Lab Results   Component Value Date/Time    A1C 7.0 (A) 12/23/2024 02:22 PM          Microalb (most recent labs)   Lab Results   Component Value Date/Time    MICROALBUMIN 7.9 12/30/2014 09:48 AM    MICROALBCREA 600.0 (H) 05/20/2024 09:28 AM        Lab results reviewed with patient.    REVIEW OF SYSTEMS:   GENERAL HEALTH: feels well otherwise  SKIN: current healing left foot wound  RESPIRATORY: denies shortness of breath with exertion  CARDIOVASCULAR: denies chest pain on exertion  GI: denies nausea, abdominal pain or heartburn  NEURO: c/o occasional cramping to bilateral feet at night, denies headaches   ENDO: denies polydipsia, polyuria or polyphagia, denies unexplained weight changes    EXAM:   /70   Pulse 80   Resp 18   Wt 183 lb (83 kg)   SpO2 98%   BMI 26.26 kg/m²  Estimated body mass index is 26.26 kg/m² as calculated from the following:    Height as of 11/15/24: 5' 10\" (1.778 m).    Weight as of this encounter: 183 lb (83 kg).   Physical Exam  Vitals reviewed.   Constitutional:       Appearance: Normal appearance.   Pulmonary:      Effort: Pulmonary effort is normal.   Neurological:      Mental Status: He is alert and oriented to person, place, and time.   Psychiatric:         Mood and Affect: Mood normal.         Behavior: Behavior normal.         ASSESSMENT AND PLAN:   As for his Diabetes, it is stable, improved, no significant medication side effects noted, needs to follow diet more regularly.   Recommendations are:  Patient to continue Metformin 1000mg po bid and increase Ozempic to  1mg injection weekly    Per ADA guidelines, in patients with type 2 diabetes and established ASCVD, incorporating agent proven to reduce major adverse CV events and CV mortality   GLP-1 Ozempic rx chosen since it not only lowers A1C, but studies have shown it can also reduce the risk of major CV events such as heart attack, stroke, or death in adults with type 2 diabetes who are currently treating their CV disease.  Patient to continue to use personal Freestyle [employer] CGM for glucose monitoring.  Discussed self monitoring blood glucose and the importance of monitoring.  Patient agreed to monitoring bid - fasting and 2 hours postprandial of one meal per day if not using CGM.      Reinforced healthy eating in healthy portions and increasing daily physical activity.  Discussed meeting with dietitian for MNT, patient declines at this time.    Reviewed ways to improve the protein in the urine:   Keep blood sugars in target range   Keep blood pressure in target range   Watch over the counter medicines like NSAID (non steroidal anti-inflammatory drugs) these can be harmful to  kidneys (examples include: , Motrin , Advil, ibuprofen, naprosyn, Aleve)   Continue ARB therapy - renoprotective         As for his hypertension, Blood Pressure is well controlled, stable, no significant medication side effects noted.   PLAN: will continue present medications, reviewed diet, exercise and weight control, continue current hypertensive medication including ARB therapy.     As for his cholesterol, Lipids are stable, no significant medication side effects noted, needs further observation, needs improvement, needs to follow diet more regularly.   PLAN: will continue present medications, reviewed diet, exercise and weight control, continue statin therapy.      DM Health Maintenance  Last dilated eye exam: Last Dilated Eye Exam: 04/12/24   Exam shows retinopathy? Eye Exam shows Diabetic Retinopathy?: Yes    Last diabetic foot exam: Last Foot  Exam: 24    Date of last PHQ-2 depression screen: PHQ-2 - Date of last depression screenin2024    Patient  reports that he has never smoked. He has never been exposed to tobacco smoke. He has never used smokeless tobacco.  When is flu vaccine due? Influenza Vaccine(1) due on 10/01/2024  When is pneumonia vaccine due? No recommendations at this time    The patient indicates understanding of these issues and agrees to the plan.  Refills addressed at time of office visit.    Diagnoses and all orders for this visit:    Type 2 diabetes mellitus with hyperglycemia, with long-term current use of insulin (HCC)  -     POC Hemoglobin A1C  -     Comp Metabolic Panel (14) [E]; Future  -     Hemoglobin A1C [E]; Future  -     Microalb/Creat Ratio, Random Urine [E]; Future  -     semaglutide (OZEMPIC, 1 MG/DOSE,) 4 MG/3ML Subcutaneous Solution Pen-injector; Inject 1 mg into the skin once a week.    Primary hypertension    Mixed hyperlipidemia  -     Lipid Panel [E]; Future    Macroalbuminuric diabetic nephropathy (HCC)    Influenza vaccine needed  -     Fluzone trivalent vaccine, PF 0.5mL, 6mo+ (88821)       Return in about 3 months (around 3/23/2025) for 45 minute appointment, Personal CGM.    The risks and benefits of my recommendations, as well as other treatment options were discussed with the patient today. Questions were answered to the best of my knowledge.   30 min spent with patient and >50% time spent counseling and coordinating care related to their office visit.      Laly PERAZA, BC-ADM, [facility]

## 2024-12-23 NOTE — PROGRESS NOTES
Name: Farzad Romo  YOB: 1975  Report Period: 12/10/2024 - 12/23/2024 (14 days)  Generated: 12/23/2024  % Time CGM Active: 60%      Glucose Statistics and Targets  Average Glucose: 178 mg/dL  Glucose Management Indicator (GMI): 7.6%  Glucose Variability (%CV): 23.3%  Target Range: 70 - 180 mg/dL      Time in Ranges  Very High: >250 mg/dL --- 5%  High: 181 - 250 mg/dL --- 37%  Target Range: 70 - 180 mg/dL --- 58%  Low: 54 - 69 mg/dL --- 0%  Very Low: <54 mg/dL --- 0%

## 2024-12-31 ENCOUNTER — OFFICE VISIT (OUTPATIENT)
Facility: LOCATION | Age: 49
End: 2024-12-31
Payer: COMMERCIAL

## 2024-12-31 DIAGNOSIS — E11.621 TYPE 2 DIABETES MELLITUS WITH FOOT ULCER, WITH LONG-TERM CURRENT USE OF INSULIN (HCC): ICD-10-CM

## 2024-12-31 DIAGNOSIS — E11.65 UNCONTROLLED TYPE 2 DIABETES MELLITUS WITH HYPERGLYCEMIA (HCC): ICD-10-CM

## 2024-12-31 DIAGNOSIS — L97.509 TYPE 2 DIABETES MELLITUS WITH FOOT ULCER, WITH LONG-TERM CURRENT USE OF INSULIN (HCC): ICD-10-CM

## 2024-12-31 DIAGNOSIS — E11.628 DIABETIC INFECTION OF LEFT FOOT (HCC): ICD-10-CM

## 2024-12-31 DIAGNOSIS — L08.9 DIABETIC INFECTION OF LEFT FOOT (HCC): ICD-10-CM

## 2024-12-31 DIAGNOSIS — Z79.4 TYPE 2 DIABETES MELLITUS WITH FOOT ULCER, WITH LONG-TERM CURRENT USE OF INSULIN (HCC): ICD-10-CM

## 2024-12-31 DIAGNOSIS — Z98.890 POST-OPERATIVE STATE: Primary | ICD-10-CM

## 2024-12-31 PROCEDURE — 99024 POSTOP FOLLOW-UP VISIT: CPT

## 2025-01-01 NOTE — PROGRESS NOTES
[facility]  Progress Note  12/31/2024    Post-op visit.        HPI:     Pt is a pleasant 49 year old male who presents for his sixth post-op visit. Pt had Incision and Drainage of left foot abscess, excision of tibial sesamoid of left foot completed by Dr Arreola on 11/9/2024. Pt has dressings intact and is without pain.  Patient has been dressing area with Betadine and a Band-Aid.  Patient is walking in a postop shoe. Denies complaints of nausea, vomiting, fever, chills, or calf pain.  Patient does mention that he had a recent visit with his diabetic educator.  States that his hemoglobin A1c was 7.0 on 12/23/2024, medications were left unchanged and was educated that he needs to continue to follow a healthy diet.    Allergies: [place] and Seasonal   Current Outpatient Medications   Medication Sig Dispense Refill    semaglutide (OZEMPIC, 1 MG/DOSE,) 4 MG/3ML Subcutaneous Solution Pen-injector Inject 1 mg into the skin once a week. 9 mL 1    METFORMIN HCL 1000 MG Oral Tab TAKE 1 TABLET(1000 MG) BY MOUTH TWICE DAILY 180 tablet 1    losartan 100 MG Oral Tab TAKE 1 TABLET(100 MG) BY MOUTH DAILY 90 tablet 1    montelukast 10 MG Oral Tab Take 1 tablet (10 mg total) by mouth nightly.      omega-3 fatty acids 1000 MG Oral Cap Take 1,000 mg by mouth daily.      ATORVASTATIN 20 MG Oral Tab TAKE 1 TABLET(20 MG) BY MOUTH EVERY NIGHT 30 tablet 5    amLODIPine 5 MG Oral Tab Take 1 tablet (5 mg total) by mouth 2 (two) times daily. Ok to take 1-2 tablets if BP average is over 150/90 60 tablet 5    FREESTYLE SUNNY 3 SENSOR Does not apply Misc 1 each every 14 (fourteen) days. 2 each 11    Glucose Blood (CONTOUR NEXT TEST) In Vitro Strip 1 each by Other route 3 (three) times daily. 100 strip 5      Past Medical History:    Essential hypertension    High blood pressure    Hyperlipidemia    Type II or unspecified type diabetes mellitus without mention of complication, not stated as uncontrolled    Visual impairment      Past  Surgical History:   Procedure Laterality Date    Appendectomy  10 yrs ago      Family History   Problem Relation Age of Onset    Hypertension Father     Diabetes Father     Hypertension Mother     Diabetes Mother     Diabetes Sister     Diabetes Sister     Diabetes Brother     Dementia Maternal Aunt     Dementia Maternal Aunt     Dementia Maternal Aunt       Social History     Socioeconomic History    Marital status: Single   Tobacco Use    Smoking status: Never     Passive exposure: Never    Smokeless tobacco: Never   Vaping Use    Vaping status: Never Used   Substance and Sexual Activity    Alcohol use: Not Currently     Comment: rarely    Drug use: No   Other Topics Concern    Caffeine Concern No     Comment: very little    Exercise Yes     Comment: 4x per week    Seat Belt Yes    Special Diet No    Stress Concern Yes    Weight Concern No           REVIEW OF SYSTEMS:     10 point ROS completed and was negative, except for pertinent positive and negatives stated in subjective.       EXAM:     GENERAL: well developed, well nourished, in no apparent distress    EXTREMITIES:  1. Integument: Normal skin temperature and turgor.  Small area of subdermal ecchymosis is debrided to reveal an ulcer with hypergranulation tissue. No dehiscence, drainage, or or signs of infection.        2. Vascular: Pedal pulses palpable. Minimal edema, consistent with post op course.  3. Neurological: Pt has dimished protective sensation to bilateral lower extremities  4. Musculoskeletal: Compartments are soft and compressible.     ASSESSMENT AND PLAN:   Diagnoses and all orders for this visit:    Post-operative state    Type 2 diabetes mellitus with foot ulcer, with long-term current use of insulin ([facility])    Uncontrolled type 2 diabetes mellitus with hyperglycemia ([facility])    Diabetic infection of left foot ([facility])      Plan:   -Patient was seen and evaluated today in clinic.  Chart history reviewed.  -Status post  Incision and Drainage of left  foot abscess, excision of tibial sesamoid of left foot  (DOS: 11/09/2024).    -Site inspected.  Noted to be healing well without signs of infection  - Areas of subdermal ecchymosis debrided with dermal curette.  Area is dressed with collagen matrix, painted with betadine, and covered with band-aid. Pt to continue daily dressings of Betadine and a Band-Aid.  - Patient should still continue to try to elevate and rest as much as possible.    -Educated patient on tight glucose control by following diet and medication regimen to aid in wound healing.     -Educated patient on acute signs of infection and advised patient to seek immediate medical attention if any concerns arise.    -All of the patient's questions and concerns were addressed.  He indicates understanding of these issues and agrees to the plan.     Time spent reviewing pertinent information from patient's chart, reviewing any pertinent imaging, obtaining history and physical exam, discussing and mutually agreeing on a treatment plan, and documenting encounter: 30 minutes     RTC in 1 week for wound check/further wound debridement.  Patient should return sooner if other concerns arise.       CONNOR Campos        [facility]            Dragon speech recognition software was used to prepare this note.  Errors in word recognition may occur.  Please contact me with any questions/concerns with this note.

## 2025-01-07 ENCOUNTER — OFFICE VISIT (OUTPATIENT)
Facility: LOCATION | Age: 50
End: 2025-01-07
Payer: COMMERCIAL

## 2025-01-07 DIAGNOSIS — Z98.890 POST-OPERATIVE STATE: Primary | ICD-10-CM

## 2025-01-07 DIAGNOSIS — L08.9 DIABETIC INFECTION OF LEFT FOOT (HCC): ICD-10-CM

## 2025-01-07 DIAGNOSIS — E11.621 TYPE 2 DIABETES MELLITUS WITH FOOT ULCER, WITH LONG-TERM CURRENT USE OF INSULIN (HCC): ICD-10-CM

## 2025-01-07 DIAGNOSIS — M86.172 OTHER ACUTE OSTEOMYELITIS OF LEFT FOOT (HCC): ICD-10-CM

## 2025-01-07 DIAGNOSIS — L97.509 TYPE 2 DIABETES MELLITUS WITH FOOT ULCER, WITH LONG-TERM CURRENT USE OF INSULIN (HCC): ICD-10-CM

## 2025-01-07 DIAGNOSIS — Z79.4 TYPE 2 DIABETES MELLITUS WITH FOOT ULCER, WITH LONG-TERM CURRENT USE OF INSULIN (HCC): ICD-10-CM

## 2025-01-07 DIAGNOSIS — E11.628 DIABETIC INFECTION OF LEFT FOOT (HCC): ICD-10-CM

## 2025-01-07 PROCEDURE — 99024 POSTOP FOLLOW-UP VISIT: CPT

## 2025-01-07 NOTE — PROGRESS NOTES
[facility]  Progress Note  1/7/2025    Post-op visit.        HPI:     Pt is a pleasant 49 year old male who presents for his seventh post-op visit. Pt had Incision and Drainage of left foot abscess, excision of tibial sesamoid of left foot completed by Dr Arreola on 11/9/2024. Patient has been dressing area with Betadine and a Band-Aid.  Patient is walking in a postop shoe. Denies complaints of nausea, vomiting, fever, chills, or calf pain.  Patient states that he has been following a healthy diet and has been continuing to watch his blood sugars.     Allergies: [place] and Seasonal   Current Outpatient Medications   Medication Sig Dispense Refill    semaglutide (OZEMPIC, 1 MG/DOSE,) 4 MG/3ML Subcutaneous Solution Pen-injector Inject 1 mg into the skin once a week. 9 mL 1    METFORMIN HCL 1000 MG Oral Tab TAKE 1 TABLET(1000 MG) BY MOUTH TWICE DAILY 180 tablet 1    losartan 100 MG Oral Tab TAKE 1 TABLET(100 MG) BY MOUTH DAILY 90 tablet 1    montelukast 10 MG Oral Tab Take 1 tablet (10 mg total) by mouth nightly.      omega-3 fatty acids 1000 MG Oral Cap Take 1,000 mg by mouth daily.      ATORVASTATIN 20 MG Oral Tab TAKE 1 TABLET(20 MG) BY MOUTH EVERY NIGHT 30 tablet 5    amLODIPine 5 MG Oral Tab Take 1 tablet (5 mg total) by mouth 2 (two) times daily. Ok to take 1-2 tablets if BP average is over 150/90 60 tablet 5    FREESTYLE SUNNY 3 SENSOR Does not apply Misc 1 each every 14 (fourteen) days. 2 each 11    Glucose Blood (CONTOUR NEXT TEST) In Vitro Strip 1 each by Other route 3 (three) times daily. 100 strip 5      Past Medical History:    Essential hypertension    High blood pressure    Hyperlipidemia    Type II or unspecified type diabetes mellitus without mention of complication, not stated as uncontrolled    Visual impairment      Past Surgical History:   Procedure Laterality Date    Appendectomy  10 yrs ago      Family History   Problem Relation Age of Onset    Hypertension Father     Diabetes Father      Hypertension Mother     Diabetes Mother     Diabetes Sister     Diabetes Sister     Diabetes Brother     Dementia Maternal Aunt     Dementia Maternal Aunt     Dementia Maternal Aunt       Social History     Socioeconomic History    Marital status: Single   Tobacco Use    Smoking status: Never     Passive exposure: Never    Smokeless tobacco: Never   Vaping Use    Vaping status: Never Used   Substance and Sexual Activity    Alcohol use: Not Currently     Comment: rarely    Drug use: No   Other Topics Concern    Caffeine Concern No     Comment: very little    Exercise Yes     Comment: 4x per week    Seat Belt Yes    Special Diet No    Stress Concern Yes    Weight Concern No           REVIEW OF SYSTEMS:     10 point ROS completed and was negative, except for pertinent positive and negatives stated in subjective.       EXAM:     GENERAL: well developed, well nourished, in no apparent distress    EXTREMITIES:  1. Integument: Normal skin temperature and turgor.  Small area of subdermal ecchymosis. No dehiscence, drainage, or or signs of infection.        2. Vascular: Pedal pulses palpable. Capillary refill normal. No edema noted.  3. Neurological: Normal sharp dull sensation; reflexes normal. Pt has dimished protective sensation to bilateral lower extremities  4. Musculoskeletal: All muscle groups are graded 5/5 in the foot and ankle.     ASSESSMENT AND PLAN:   Diagnoses and all orders for this visit:    Post-operative state    Diabetic infection of left foot (HCC)    Type 2 diabetes mellitus with foot ulcer, with long-term current use of insulin (HCC)    Other acute osteomyelitis of left foot (HCC)        Plan:   -Patient was seen and evaluated today in clinic.  Chart history reviewed.  -Status post  Incision and Drainage of left foot abscess, excision of tibial sesamoid of left foot  (DOS: 11/09/2024).    -Site inspected.  Noted to be healing well without signs of infection  - Patient can shower (not soak) and dry  area well. Instructed patient that he should offload area. Offloading pad placed on patient's foot.   -Instructed patient that he can begin to transition into a well supportive tennis shoe.    -Educated patient to continue to maintain tight glucose control by following diet and medication regimen to aid in wound healing.     -Educated patient on acute signs of infection and advised patient to seek immediate medical attention if any concerns arise.    -All of the patient's questions and concerns were addressed.  He indicates understanding of these issues and agrees to the plan.     Time spent reviewing pertinent information from patient's chart, reviewing any pertinent imaging, obtaining history and physical exam, discussing and mutually agreeing on a treatment plan, and documenting encounter: 20 minutes     RTC in 4 weeks.  Patient should return sooner if other concerns arise.       CONNOR Campos        [facility]            Dragon speech recognition software was used to prepare this note.  Errors in word recognition may occur.  Please contact me with any questions/concerns with this note.

## 2025-01-13 DIAGNOSIS — E11.65 UNCONTROLLED TYPE 2 DIABETES MELLITUS WITH HYPERGLYCEMIA (HCC): ICD-10-CM

## 2025-01-14 RX ORDER — ATORVASTATIN CALCIUM 20 MG/1
20 TABLET, FILM COATED ORAL NIGHTLY
Qty: 90 TABLET | Refills: 1 | Status: SHIPPED | OUTPATIENT
Start: 2025-01-14

## 2025-02-03 ENCOUNTER — TELEPHONE (OUTPATIENT)
Dept: FAMILY MEDICINE CLINIC | Facility: CLINIC | Age: 50
End: 2025-02-03

## 2025-02-03 DIAGNOSIS — Z12.5 SCREENING FOR MALIGNANT NEOPLASM OF PROSTATE: Primary | ICD-10-CM

## 2025-02-03 NOTE — TELEPHONE ENCOUNTER
LOV 10/18/23     Pt called. Stated he has upcoming appointment with Dr. Gumaan. States at last visit discussed prostate testing. Scheduled apt is for follow up, not annual. Notified will clarify appointment with Dr. Guaman.     Future Appointments   Date Time Provider Department Center   2/20/2025  2:00 PM Lloyd Guaman MD [facility]   3/4/2025 12:30 PM William Pearson APRN [id number]   3/31/2025  2:00 PM Laly Alcantara APRN [id number]       Dr. Guaman - Pt scheduled above for follow up. Pt asking about prostate testing. Do you want upcoming appointment to be physical or will need another visit for physical? Do you want to order PSA now?

## 2025-02-18 PROBLEM — M86.9 OSTEOMYELITIS OF LEFT FOOT (HCC): Status: RESOLVED | Noted: 2024-11-08 | Resolved: 2025-02-18

## 2025-02-18 PROBLEM — L08.9 DIABETIC INFECTION OF LEFT FOOT (HCC): Status: RESOLVED | Noted: 2024-11-07 | Resolved: 2025-02-18

## 2025-02-18 PROBLEM — E11.628 DIABETIC INFECTION OF LEFT FOOT (HCC): Status: RESOLVED | Noted: 2024-11-07 | Resolved: 2025-02-18

## 2025-02-18 PROBLEM — Z87.39 HISTORY OF OSTEOMYELITIS: Status: ACTIVE | Noted: 2025-02-18

## 2025-02-20 ENCOUNTER — LAB ENCOUNTER (OUTPATIENT)
Dept: LAB | Age: 50
End: 2025-02-20
Attending: FAMILY MEDICINE
Payer: COMMERCIAL

## 2025-02-20 DIAGNOSIS — E11.9 DIABETES MELLITUS WITHOUT COMPLICATION (HCC): ICD-10-CM

## 2025-02-20 DIAGNOSIS — Z12.5 SCREENING FOR MALIGNANT NEOPLASM OF PROSTATE: ICD-10-CM

## 2025-02-20 DIAGNOSIS — E78.00 PURE HYPERCHOLESTEROLEMIA WITH TARGET LOW DENSITY LIPOPROTEIN (LDL) CHOLESTEROL LESS THAN 130 MG/DL: ICD-10-CM

## 2025-02-20 LAB
ALBUMIN SERPL-MCNC: 4.8 G/DL (ref 3.2–4.8)
ALBUMIN/GLOB SERPL: 1.5 {RATIO} (ref 1–2)
ALP LIVER SERPL-CCNC: 60 U/L
ALT SERPL-CCNC: 20 U/L
ANION GAP SERPL CALC-SCNC: 6 MMOL/L (ref 0–18)
AST SERPL-CCNC: 22 U/L (ref ?–34)
BASOPHILS # BLD AUTO: 0.05 X10(3) UL (ref 0–0.2)
BASOPHILS NFR BLD AUTO: 0.8 %
BILIRUB SERPL-MCNC: 0.8 MG/DL (ref 0.3–1.2)
BUN BLD-MCNC: 9 MG/DL (ref 9–23)
CALCIUM BLD-MCNC: 9.8 MG/DL (ref 8.7–10.6)
CHLORIDE SERPL-SCNC: 102 MMOL/L (ref 98–112)
CHOLEST SERPL-MCNC: 142 MG/DL (ref ?–200)
CO2 SERPL-SCNC: 31 MMOL/L (ref 21–32)
COMPLEXED PSA SERPL-MCNC: 0.4 NG/ML (ref ?–4)
CREAT BLD-MCNC: 1.01 MG/DL
EGFRCR SERPLBLD CKD-EPI 2021: 90 ML/MIN/1.73M2 (ref 60–?)
EOSINOPHIL # BLD AUTO: 0.17 X10(3) UL (ref 0–0.7)
EOSINOPHIL NFR BLD AUTO: 2.6 %
ERYTHROCYTE [DISTWIDTH] IN BLOOD BY AUTOMATED COUNT: 13.2 %
FASTING PATIENT LIPID ANSWER: YES
FASTING STATUS PATIENT QL REPORTED: YES
GLOBULIN PLAS-MCNC: 3.2 G/DL (ref 2–3.5)
GLUCOSE BLD-MCNC: 119 MG/DL (ref 70–99)
HCT VFR BLD AUTO: 42 %
HDLC SERPL-MCNC: 46 MG/DL (ref 40–59)
HGB BLD-MCNC: 14.9 G/DL
IMM GRANULOCYTES # BLD AUTO: 0.01 X10(3) UL (ref 0–1)
IMM GRANULOCYTES NFR BLD: 0.2 %
LDLC SERPL CALC-MCNC: 86 MG/DL (ref ?–100)
LIPASE SERPL-CCNC: 34 U/L (ref 12–53)
LYMPHOCYTES # BLD AUTO: 1.97 X10(3) UL (ref 1–4)
LYMPHOCYTES NFR BLD AUTO: 30.1 %
MCH RBC QN AUTO: 29.3 PG (ref 26–34)
MCHC RBC AUTO-ENTMCNC: 35.5 G/DL (ref 31–37)
MCV RBC AUTO: 82.7 FL
MONOCYTES # BLD AUTO: 0.34 X10(3) UL (ref 0.1–1)
MONOCYTES NFR BLD AUTO: 5.2 %
NEUTROPHILS # BLD AUTO: 4 X10 (3) UL (ref 1.5–7.7)
NEUTROPHILS # BLD AUTO: 4 X10(3) UL (ref 1.5–7.7)
NEUTROPHILS NFR BLD AUTO: 61.1 %
NONHDLC SERPL-MCNC: 96 MG/DL (ref ?–130)
OSMOLALITY SERPL CALC.SUM OF ELEC: 288 MOSM/KG (ref 275–295)
PLATELET # BLD AUTO: 187 10(3)UL (ref 150–450)
POTASSIUM SERPL-SCNC: 4.2 MMOL/L (ref 3.5–5.1)
PROT SERPL-MCNC: 8 G/DL (ref 5.7–8.2)
RBC # BLD AUTO: 5.08 X10(6)UL
SODIUM SERPL-SCNC: 139 MMOL/L (ref 136–145)
TRIGL SERPL-MCNC: 42 MG/DL (ref 30–149)
VLDLC SERPL CALC-MCNC: 7 MG/DL (ref 0–30)
WBC # BLD AUTO: 6.5 X10(3) UL (ref 4–11)

## 2025-02-20 PROCEDURE — 36415 COLL VENOUS BLD VENIPUNCTURE: CPT

## 2025-02-20 PROCEDURE — 85025 COMPLETE CBC W/AUTO DIFF WBC: CPT

## 2025-02-20 PROCEDURE — 83690 ASSAY OF LIPASE: CPT

## 2025-02-20 PROCEDURE — 80053 COMPREHEN METABOLIC PANEL: CPT

## 2025-02-20 PROCEDURE — 80061 LIPID PANEL: CPT

## 2025-02-24 ENCOUNTER — OFFICE VISIT (OUTPATIENT)
Dept: FAMILY MEDICINE CLINIC | Facility: CLINIC | Age: 50
End: 2025-02-24
Payer: COMMERCIAL

## 2025-02-24 DIAGNOSIS — Z87.39 HISTORY OF OSTEOMYELITIS: ICD-10-CM

## 2025-02-24 DIAGNOSIS — Z00.00 ANNUAL PHYSICAL EXAM: Primary | ICD-10-CM

## 2025-02-24 DIAGNOSIS — E11.621 TYPE 2 DIABETES MELLITUS WITH FOOT ULCER, WITH LONG-TERM CURRENT USE OF INSULIN (HCC): ICD-10-CM

## 2025-02-24 DIAGNOSIS — L97.509 TYPE 2 DIABETES MELLITUS WITH FOOT ULCER, WITH LONG-TERM CURRENT USE OF INSULIN (HCC): ICD-10-CM

## 2025-02-24 DIAGNOSIS — Z79.4 TYPE 2 DIABETES MELLITUS WITH FOOT ULCER, WITH LONG-TERM CURRENT USE OF INSULIN (HCC): ICD-10-CM

## 2025-02-24 PROBLEM — E11.65 UNCONTROLLED TYPE 2 DIABETES MELLITUS WITH HYPERGLYCEMIA (HCC): Status: RESOLVED | Noted: 2021-04-05 | Resolved: 2025-02-24

## 2025-02-24 LAB — HEMOGLOBIN A1C: 7.1 % (ref 4.3–5.6)

## 2025-02-25 VITALS
DIASTOLIC BLOOD PRESSURE: 89 MMHG | WEIGHT: 181 LBS | RESPIRATION RATE: 18 BRPM | BODY MASS INDEX: 25.91 KG/M2 | HEIGHT: 70 IN | TEMPERATURE: 97 F | OXYGEN SATURATION: 98 % | HEART RATE: 78 BPM | SYSTOLIC BLOOD PRESSURE: 138 MMHG

## 2025-02-27 NOTE — PROGRESS NOTES
Subjective:   Farzad Romo is a 49 year old male who presents for Physical (Here for physical )         History/Other:   History of Present Illness  The patient, a 49-year-old male with a history of insulin-dependent diabetes, presents for a follow-up visit. The patient's blood pressure is noted to be slightly elevated, and the patient's last A1c was 7.0, taken on 12/23/2024. The patient has been compliant with his medication.    The patient discusses a recent foot infection, which began as a small cut on the foot. The patient noticed swelling after returning from a vacation. Despite initial attempts to manage the wound with Neosporin, the infection worsened, leading to hospitalization. The patient underwent surgery for debridement of the wound and was treated with IV antibiotics for a month. The patient's foot has since healed, with only a small divot remaining where the wound was.    The patient also discusses his diabetes management, including his use of Ozempic. The patient has noticed significant weight loss since starting this medication, dropping from 196 lbs to 182 lbs. The patient expresses concern about this weight loss.    The patient's blood pressure management is also discussed. The patient has been taking amlodipine once a day in addition to losartan. The patient notes that his blood pressure has been running slightly high, with readings in the 140s.   Chief Complaint Reviewed and Verified  Nursing Notes Reviewed and   Verified  Tobacco Reviewed  Allergies Reviewed  Medications Reviewed    Problem List Reviewed  Medical History Reviewed  Surgical History   Reviewed  Family History Reviewed  Social History Reviewed         Tobacco:  He has never smoked tobacco.    Current Outpatient Medications   Medication Sig Dispense Refill    atorvastatin 20 MG Oral Tab TAKE 1 TABLET(20 MG) BY MOUTH EVERY NIGHT 90 tablet 1    semaglutide (OZEMPIC, 1 MG/DOSE,) 4 MG/3ML Subcutaneous Solution  Pen-injector Inject 1 mg into the skin once a week. 9 mL 1    METFORMIN HCL 1000 MG Oral Tab TAKE 1 TABLET(1000 MG) BY MOUTH TWICE DAILY 180 tablet 1    losartan 100 MG Oral Tab TAKE 1 TABLET(100 MG) BY MOUTH DAILY 90 tablet 1    montelukast 10 MG Oral Tab Take 1 tablet (10 mg total) by mouth nightly.      omega-3 fatty acids 1000 MG Oral Cap Take 1,000 mg by mouth daily.      amLODIPine 5 MG Oral Tab Take 1 tablet (5 mg total) by mouth 2 (two) times daily. Ok to take 1-2 tablets if BP average is over 150/90 60 tablet 5    FREESTYLE SUNNY 3 SENSOR Does not apply Misc 1 each every 14 (fourteen) days. 2 each 11    Glucose Blood (CONTOUR NEXT TEST) In Vitro Strip 1 each by Other route 3 (three) times daily. 100 strip 5         Review of Systems:  Review of Systems  Patient denies shortness of breath, denies chest pain and denies any recent fevers or chills.    Patient reports no urinary complaints and denies headaches or visual disturbances.   Patient denies any abdominal pain at this time. Patient has no new skin lesions.  Patient reports no acute back pain and reports no dizziness or headaches.   Patient reports no visual disturbances and reports hearing has been about the same.   Patient reports no recent injury or trauma.       Objective:   /89   Pulse 78   Temp 97.2 °F (36.2 °C) (Temporal)   Resp 18   Ht 5' 10\" (1.778 m)   Wt 181 lb (82.1 kg)   SpO2 98%   BMI 25.97 kg/m²  Estimated body mass index is 25.97 kg/m² as calculated from the following:    Height as of this encounter: 5' 10\" (1.778 m).    Weight as of this encounter: 181 lb (82.1 kg).  Physical Exam  Constitutional:       Appearance: He is well-developed.   Cardiovascular:      Rate and Rhythm: Normal rate and regular rhythm.      Heart sounds: Normal heart sounds.   Pulmonary:      Effort: Pulmonary effort is normal.      Breath sounds: Normal breath sounds.   Abdominal:      General: Bowel sounds are normal.      Palpations: Abdomen is  soft.   Skin:     General: Skin is warm and dry.   Neurological:      Mental Status: He is alert and oriented to person, place, and time.      Deep Tendon Reflexes: Reflexes are normal and symmetric.     Left foot wound has pin point opening see photo from media.   Results  LABS  A1c: 7.0 (12/23/2024)  PSA: normal  Lipase: normal  GFR: 90  AST: normal  ALT: normal        Assessment & Plan:   1. Annual physical exam (Primary)  2. Type 2 diabetes mellitus with foot ulcer, with long-term current use of insulin ([facility])  -     POC Hemoglobin A1C  3. History of osteomyelitis    Assessment & Plan  Diabetic Foot Infection  History of a foot infection in November 2024 that required hospitalization, IV antibiotics, and surgical debridement. The wound is now healed.  -Continue to monitor foot for any new wounds or signs of infection.    Type 2 Diabetes Mellitus  Last A1c was 7.0 on 12/23/2024. Patient is on Ozempic and has experienced weight loss.  -Check A1c today in the office.  -Consider reducing Ozempic dose to 0.5mg depending on A1c results.    Hypertension  Blood pressure readings have been in the 140s, higher than usual for the patient. Currently on Losartan and Amlodipine.  -Increase Amlodipine to twice daily if blood pressure continues to be in the 140s.  -Continue Losartan to protect kidneys.    General Health Maintenance  -Continue monitoring blood glucose levels, blood pressure, and foot health.  -Follow up with Laly next month.        Lloyd Guaman MD, 2/27/2025, 9:55 AM

## 2025-03-06 DIAGNOSIS — I10 ESSENTIAL HYPERTENSION: ICD-10-CM

## 2025-03-06 RX ORDER — AMLODIPINE BESYLATE 5 MG/1
5 TABLET ORAL 2 TIMES DAILY
Qty: 60 TABLET | Refills: 5 | Status: SHIPPED | OUTPATIENT
Start: 2025-03-06

## 2025-03-26 ENCOUNTER — OFFICE VISIT (OUTPATIENT)
Facility: LOCATION | Age: 50
End: 2025-03-26
Payer: COMMERCIAL

## 2025-03-26 DIAGNOSIS — E11.628 DIABETIC INFECTION OF LEFT FOOT (HCC): ICD-10-CM

## 2025-03-26 DIAGNOSIS — L97.509 TYPE 2 DIABETES MELLITUS WITH FOOT ULCER, WITH LONG-TERM CURRENT USE OF INSULIN (HCC): ICD-10-CM

## 2025-03-26 DIAGNOSIS — Z98.890 POST-OPERATIVE STATE: Primary | ICD-10-CM

## 2025-03-26 DIAGNOSIS — M86.172 OTHER ACUTE OSTEOMYELITIS OF LEFT FOOT (HCC): ICD-10-CM

## 2025-03-26 DIAGNOSIS — Z79.4 TYPE 2 DIABETES MELLITUS WITH FOOT ULCER, WITH LONG-TERM CURRENT USE OF INSULIN (HCC): ICD-10-CM

## 2025-03-26 DIAGNOSIS — L08.9 DIABETIC INFECTION OF LEFT FOOT (HCC): ICD-10-CM

## 2025-03-26 DIAGNOSIS — E11.621 TYPE 2 DIABETES MELLITUS WITH FOOT ULCER, WITH LONG-TERM CURRENT USE OF INSULIN (HCC): ICD-10-CM

## 2025-03-26 PROCEDURE — 99024 POSTOP FOLLOW-UP VISIT: CPT

## 2025-03-26 NOTE — PROGRESS NOTES
[facility]  Progress Note  1/7/2025    Post-op visit.        HPI:     Pt is a pleasant 49 year old male who presents for last post-op visit. Pt had Incision and Drainage of left foot abscess, excision of tibial sesamoid of left foot completed by Dr Arreola on 11/9/2024. Patient states that the wound has completely healed and has been moisturizing area. He has been ambulatory in [employer] shoes. Denies complaints of nausea, vomiting, fever, chills, or calf pain.  Patient states that he has been following a healthy diet and has been continuing to watch his blood sugars. Last HgbA1C 7.1 on 2/24/25.    Allergies: [place]atz[place] and Seasonal   Current Outpatient Medications   Medication Sig Dispense Refill    AMLODIPINE 5 MG Oral Tab TAKE 1 TABLET BY MOUTH IN THE MORNING AND 1 TABLET BEFORE BEDTIME 60 tablet 5    atorvastatin 20 MG Oral Tab TAKE 1 TABLET(20 MG) BY MOUTH EVERY NIGHT 90 tablet 1    semaglutide (OZEMPIC, 1 MG/DOSE,) 4 MG/3ML Subcutaneous Solution Pen-injector Inject 1 mg into the skin once a week. 9 mL 1    METFORMIN HCL 1000 MG Oral Tab TAKE 1 TABLET(1000 MG) BY MOUTH TWICE DAILY 180 tablet 1    losartan 100 MG Oral Tab TAKE 1 TABLET(100 MG) BY MOUTH DAILY 90 tablet 1    montelukast 10 MG Oral Tab Take 1 tablet (10 mg total) by mouth nightly.      omega-3 fatty acids 1000 MG Oral Cap Take 1,000 mg by mouth daily.      FREESTYLE SUNNY 3 SENSOR Does not apply Misc 1 each every 14 (fourteen) days. 2 each 11    Glucose Blood (CONTOUR NEXT TEST) In Vitro Strip 1 each by Other route 3 (three) times daily. 100 strip 5      Past Medical History:    Allergic rhinitis    Essential hypertension    High blood pressure    Hyperlipidemia    Type II or unspecified type diabetes mellitus without mention of complication, not stated as uncontrolled    Visual impairment      Past Surgical History:   Procedure Laterality Date    Appendectomy  10 yrs ago      Family History   Problem Relation Age of Onset    Hypertension Father      Diabetes Father     Hypertension Mother     Diabetes Mother     Diabetes Sister     Diabetes Sister     Diabetes Brother     Dementia Maternal Aunt     Dementia Maternal Aunt     Dementia Maternal Aunt       Social History     Socioeconomic History    Marital status: Single   Tobacco Use    Smoking status: Never     Passive exposure: Never    Smokeless tobacco: Never   Vaping Use    Vaping status: Never Used   Substance and Sexual Activity    Alcohol use: Not Currently     Comment: rarely    Drug use: No   Other Topics Concern    Caffeine Concern No    Exercise No    Seat Belt No    Special Diet No    Stress Concern No    Weight Concern No           REVIEW OF SYSTEMS:     10 point ROS completed and was negative, except for pertinent positive and negatives stated in subjective.       EXAM:     GENERAL: well developed, well nourished, in no apparent distress    EXTREMITIES:  1. Integument: Normal skin temperature and turgor.  Incision area to dorsal foot with minor amount of hpk.        2. Vascular: Pedal pulses palpable. Capillary refill normal. No edema noted.  3. Neurological: Normal sharp dull sensation; reflexes normal. Pt has dimished protective sensation to bilateral lower extremities  4. Musculoskeletal: All muscle groups are graded 5/5 in the foot and ankle.     ASSESSMENT AND PLAN:   Diagnoses and all orders for this visit:    Post-operative state    Type 2 diabetes mellitus with foot ulcer, with long-term current use of insulin (HCC)    Diabetic infection of left foot (HCC)    Other acute osteomyelitis of left foot (HCC)      Plan:   -Patient was seen and evaluated today in clinic.  Chart history reviewed.  -Status post  Incision and Drainage of left foot abscess, excision of tibial sesamoid of left foot  (DOS: 11/09/2024).    -Site inspected and is well healed. Patient can be discharged from post-op care to this area.     -Educated patient to continue to maintain tight glucose control by following diet  and medication regimen to aid in wound healing.     -Handout given to patient with shoe gear and supportive inserts.     -Educated patient on acute signs of infection and advised patient to seek immediate medical attention if any concerns arise.    -All of the patient's questions and concerns were addressed.  He indicates understanding of these issues and agrees to the plan.     Time spent reviewing pertinent information from patient's chart, reviewing any pertinent imaging, obtaining history and physical exam, discussing and mutually agreeing on a treatment plan, and documenting encounter: 20 minutes     RTC in 3 months for diabetic foot exam.  Patient should return sooner if other concerns arise.       CONNOR Campos        [facility]            Dragon speech recognition software was used to prepare this note.  Errors in word recognition may occur.  Please contact me with any questions/concerns with this note.

## 2025-03-31 ENCOUNTER — OFFICE VISIT (OUTPATIENT)
Dept: ENDOCRINOLOGY CLINIC | Facility: CLINIC | Age: 50
End: 2025-03-31
Payer: COMMERCIAL

## 2025-03-31 VITALS
WEIGHT: 184 LBS | DIASTOLIC BLOOD PRESSURE: 62 MMHG | OXYGEN SATURATION: 98 % | RESPIRATION RATE: 18 BRPM | SYSTOLIC BLOOD PRESSURE: 118 MMHG | HEART RATE: 73 BPM | BODY MASS INDEX: 26 KG/M2

## 2025-03-31 DIAGNOSIS — Z79.4 TYPE 2 DIABETES MELLITUS WITH HYPERGLYCEMIA, WITH LONG-TERM CURRENT USE OF INSULIN (HCC): Primary | ICD-10-CM

## 2025-03-31 DIAGNOSIS — E11.21 MACROALBUMINURIC DIABETIC NEPHROPATHY (HCC): ICD-10-CM

## 2025-03-31 DIAGNOSIS — I10 PRIMARY HYPERTENSION: ICD-10-CM

## 2025-03-31 DIAGNOSIS — E11.65 TYPE 2 DIABETES MELLITUS WITH HYPERGLYCEMIA, WITH LONG-TERM CURRENT USE OF INSULIN (HCC): Primary | ICD-10-CM

## 2025-03-31 DIAGNOSIS — E78.2 MIXED HYPERLIPIDEMIA: ICD-10-CM

## 2025-03-31 LAB
CREAT UR-SCNC: 122.4 MG/DL
MICROALBUMIN UR-MCNC: 48.8 MG/DL
MICROALBUMIN/CREAT 24H UR-RTO: 398.7 UG/MG (ref ?–30)

## 2025-03-31 PROCEDURE — 3078F DIAST BP <80 MM HG: CPT | Performed by: NURSE PRACTITIONER

## 2025-03-31 PROCEDURE — 99214 OFFICE O/P EST MOD 30 MIN: CPT | Performed by: NURSE PRACTITIONER

## 2025-03-31 PROCEDURE — 82043 UR ALBUMIN QUANTITATIVE: CPT | Performed by: NURSE PRACTITIONER

## 2025-03-31 PROCEDURE — 95251 CONT GLUC MNTR ANALYSIS I&R: CPT | Performed by: NURSE PRACTITIONER

## 2025-03-31 PROCEDURE — 82570 ASSAY OF URINE CREATININE: CPT | Performed by: NURSE PRACTITIONER

## 2025-03-31 PROCEDURE — 3074F SYST BP LT 130 MM HG: CPT | Performed by: NURSE PRACTITIONER

## 2025-03-31 NOTE — PROGRESS NOTES
HPI:   Farzad Romo is a 49 year old male who presents for follow up for the management of his diabetes.     Chief Complaint   Patient presents with    Diabetes     Follow up-[facility]        Diabetes: stable, needs further improvement  Type: 2   Duration:dx   Current Meds: Metformin 1000mg po bid, Ozempic 1mg injection weekly  SE: denies  Long term insulin use: yes  Failed Meds/Previous Tx: Onglyza, glipizide, Lantus, Glyxambi, Tresiba, Basaglar, Ozempic, Semglee  Complications: Neuropathy  Retinopathy  Proteinuria  Diabetic Ulcers - healing left foot wound      Personal Freestyle [facility] CGM  Analysis of data: 3/18/2025 - 3/31/2025  % Time CGM is Active: 75%  Sensor download: full report  in media  Average glucose : 192 mg/dl   GMI: 7.9%    CV (coefficient of variation) : 26.3%     42% time above 180mg /dl   14% time above 250 mg/dl  44% time in target range:  mg/dl   0% time below target range: 70mg/dl     Evaluation   1. Baseline hyperglycemia  2. Evening postprandial elevation   3. Low likelihood of hypoglycemia  4. Normal glucose variability         Overall glucose control:   HGBA1C:    Lab Results   Component Value Date    A1C 7.1 (A) 2025    A1C 7.0 (A) 2024    A1C 7.9 (H) 10/11/2024     (H) 10/11/2024       Hypertension: stable, at goal  Blood Pressure: 118/62   Medication prescribed: losartan, amlodipine  SE: denies     Hyperlipidemia: stable, needs further improvement  LDL:86   Tri  Medication prescribed: atorvastatin  SE: denies     Wt Readings from Last 3 Encounters:   25 184 lb (83.5 kg)   25 181 lb (82.1 kg)   24 183 lb (83 kg)     BP Readings from Last 3 Encounters:   25 118/62   25 138/89   24 120/70          Past History:   He  has a past medical history of Allergic rhinitis, Essential hypertension, High blood pressure, Hyperlipidemia, Type II or unspecified type diabetes mellitus without mention of complication, not stated as  uncontrolled, and Visual impairment.   His family history includes Dementia in his maternal aunt, maternal aunt, and maternal aunt; Diabetes in his brother, father, mother, sister, and sister; Hypertension in his father and mother.   He  reports that he has never smoked. He has never been exposed to tobacco smoke. He has never used smokeless tobacco. He reports that he does not currently use alcohol. He reports that he does not use drugs.     He is allergic to almond and seasonal.     Current Outpatient Medications on File Prior to Visit   Medication Sig    AMLODIPINE 5 MG Oral Tab TAKE 1 TABLET BY MOUTH IN THE MORNING AND 1 TABLET BEFORE BEDTIME    atorvastatin 20 MG Oral Tab TAKE 1 TABLET(20 MG) BY MOUTH EVERY NIGHT    METFORMIN HCL 1000 MG Oral Tab TAKE 1 TABLET(1000 MG) BY MOUTH TWICE DAILY    losartan 100 MG Oral Tab TAKE 1 TABLET(100 MG) BY MOUTH DAILY    montelukast 10 MG Oral Tab Take 1 tablet (10 mg total) by mouth nightly.    omega-3 fatty acids 1000 MG Oral Cap Take 1,000 mg by mouth daily.    FREESTYLE SUNNY 3 SENSOR Does not apply Misc 1 each every 14 (fourteen) days.    Glucose Blood (CONTOUR NEXT TEST) In Vitro Strip 1 each by Other route 3 (three) times daily.    [DISCONTINUED] semaglutide (OZEMPIC, 1 MG/DOSE,) 4 MG/3ML Subcutaneous Solution Pen-injector Inject 1 mg into the skin once a week.     No current facility-administered medications on file prior to visit.       CMP  (most recent labs)   Lab Results   Component Value Date/Time     (H) 02/20/2025 11:15 AM    BUN 9 02/20/2025 11:15 AM    CREATSERUM 1.01 02/20/2025 11:15 AM    GFRNAA 112 07/19/2021 11:50 AM    GFRAA 129 07/19/2021 11:50 AM    EGFRCR 90 02/20/2025 11:15 AM    CA 9.8 02/20/2025 11:15 AM    ALKPHO 60 02/20/2025 11:15 AM    AST 22 02/20/2025 11:15 AM    ALT 20 02/20/2025 11:15 AM    BILT 0.8 02/20/2025 11:15 AM    TP 8.0 02/20/2025 11:15 AM    ALB 4.8 02/20/2025 11:15 AM     02/20/2025 11:15 AM    K 4.2 02/20/2025 11:15  AM     02/20/2025 11:15 AM    CO2 31.0 02/20/2025 11:15 AM           Lipids  (most recent labs)   Lab Results   Component Value Date/Time    CHOLEST 142 02/20/2025 11:15 AM    TRIG 42 02/20/2025 11:15 AM    HDL 46 02/20/2025 11:15 AM    LDL 86 02/20/2025 11:15 AM    NONHDLC 96 02/20/2025 11:15 AM          Diabetes  (most recent labs)   Lab Results   Component Value Date/Time    A1C 7.1 (A) 02/24/2025 02:46 PM          Microalb (most recent labs)   Lab Results   Component Value Date/Time    MICROALBUMIN 7.9 12/30/2014 09:48 AM    MICROALBCREA 600.0 (H) 05/20/2024 09:28 AM        Lab results reviewed with patient.    REVIEW OF SYSTEMS:   GENERAL HEALTH: feels well otherwise  SKIN: current healing left foot wound  RESPIRATORY: denies shortness of breath with exertion  CARDIOVASCULAR: denies chest pain on exertion  GI: denies nausea, abdominal pain or heartburn  NEURO: c/o occasional cramping to bilateral feet at night, denies headaches   ENDO: denies polydipsia, polyuria or polyphagia, denies unexplained weight changes    EXAM:   /62   Pulse 73   Resp 18   Wt 184 lb (83.5 kg)   SpO2 98%   BMI 26.40 kg/m²  Estimated body mass index is 26.4 kg/m² as calculated from the following:    Height as of 2/24/25: 5' 10\" (1.778 m).    Weight as of this encounter: 184 lb (83.5 kg).   Physical Exam  Vitals reviewed.   Constitutional:       Appearance: Normal appearance.   Pulmonary:      Effort: Pulmonary effort is normal.   Neurological:      Mental Status: He is alert and oriented to person, place, and time.   Psychiatric:         Mood and Affect: Mood normal.         Behavior: Behavior normal.         ASSESSMENT AND PLAN:   As for his Diabetes, it is stable, no significant medication side effects noted, needs further observation, needs improvement, needs to follow diet more regularly.   Recommendations are:  Patient to continue Metformin 1000mg po bid and increase Ozempic to 2mg injection weekly    Per ADA  guidelines, in patients with type 2 diabetes and established ASCVD, incorporating agent proven to reduce major adverse CV events and CV mortality   GLP-1 Ozempic rx chosen since it not only lowers A1C, but studies have shown it can also reduce the risk of major CV events such as heart attack, stroke, or death in adults with type 2 diabetes who are currently treating their CV disease.  Patient to continue to use personal [facility] CGM for glucose monitoring.  Discussed self monitoring blood glucose and the importance of monitoring.  Patient agreed to monitoring bid - fasting and 2 hours postprandial of one meal per day if not using CGM.      Reinforced healthy eating in healthy portions and increasing daily physical activity.  Discussed meeting with dietitian for MNT, patient declines at this time.    Reviewed ways to improve the protein in the urine:   Keep blood sugars in target range   Keep blood pressure in target range   Watch over the counter medicines like NSAID (non steroidal anti-inflammatory drugs) these can be harmful to  kidneys (examples include: , Motrin , Advil, ibuprofen, naprosyn, Aleve)   Continue ARB & Ozempic therapy - renoprotective    Urine specimen obtained during office visit and sent to lab for urine microalbumin.         As for his hypertension, Blood Pressure is well controlled, stable, no significant medication side effects noted.   PLAN: will continue present medications, reviewed diet, exercise and weight control, continue current hypertensive medication including ARB therapy.     As for his cholesterol, Lipids are stable, no significant medication side effects noted, needs further observation, needs improvement, needs to follow diet more regularly.   PLAN: will continue present medications, reviewed diet, exercise and weight control, continue statin therapy.      DM Health Maintenance  Last dilated eye exam: Last Dilated Eye Exam: 04/12/24   Exam shows retinopathy? Eye Exam shows  Diabetic Retinopathy?: Yes    Last diabetic foot exam: Last Foot Exam: 03/26/25    Date of last PHQ-2 depression screen: PHQ-2 - Date of last depression screening: 3/31/2025    Patient  reports that he has never smoked. He has never been exposed to tobacco smoke. He has never used smokeless tobacco.  When is flu vaccine due? No recommendations at this time  When is pneumonia vaccine due? No recommendations at this time    The patient indicates understanding of these issues and agrees to the plan.  Refills addressed at time of office visit.    Diagnoses and all orders for this visit:    Type 2 diabetes mellitus with hyperglycemia, with long-term current use of insulin (HCC)  -     semaglutide 8 MG/3ML Subcutaneous Solution Pen-injector; Inject 2 mg into the skin once a week.    Primary hypertension    Mixed hyperlipidemia    Macroalbuminuric diabetic nephropathy (HCC)         Return in about 3 months (around 6/30/2025) for 45 minute appointment, Personal CGM.    The risks and benefits of my recommendations, as well as other treatment options were discussed with the patient today. Questions were answered to the best of my knowledge.   35 min spent with patient and >50% time spent counseling and coordinating care related to their office visit.      Laly PERAZA, BC-ADM, [facility]

## 2025-03-31 NOTE — PROGRESS NOTES
Name: Farzad Romo  YOB: 1975  Report Period: 03/04/2025 - 03/31/2025 (28 days)  Generated: 03/31/2025  Time CGM Active: 77%      Glucose Statistics and Targets  Average Glucose: 177 mg/dL  Glucose Management Indicator (GMI): 7.5%  Glucose Variability (%CV): 28.5%  Target Range: 70 - 180 mg/dL      Time in Ranges  Very High: >250 mg/dL --- 9%  High: 181 - 250 mg/dL --- 36%  Target Range: 70 - 180 mg/dL --- 55%  Low: 54 - 69 mg/dL --- 0%  Very Low: <54 mg/dL --- 0%

## 2025-04-01 ENCOUNTER — TELEPHONE (OUTPATIENT)
Dept: ENDOCRINOLOGY CLINIC | Facility: CLINIC | Age: 50
End: 2025-04-01

## 2025-04-01 ENCOUNTER — PATIENT MESSAGE (OUTPATIENT)
Dept: ENDOCRINOLOGY CLINIC | Facility: CLINIC | Age: 50
End: 2025-04-01

## 2025-04-01 NOTE — TELEPHONE ENCOUNTER
Patient had diabetic eye exam completed at:    Dr Burrows   [phone]    Call placed to office to get eye exam.    Will await report.

## 2025-04-21 ENCOUNTER — OFFICE VISIT (OUTPATIENT)
Dept: FAMILY MEDICINE CLINIC | Facility: CLINIC | Age: 50
End: 2025-04-21
Payer: COMMERCIAL

## 2025-04-21 VITALS
OXYGEN SATURATION: 98 % | RESPIRATION RATE: 18 BRPM | TEMPERATURE: 98 F | SYSTOLIC BLOOD PRESSURE: 110 MMHG | DIASTOLIC BLOOD PRESSURE: 82 MMHG | WEIGHT: 184 LBS | HEART RATE: 87 BPM | BODY MASS INDEX: 26 KG/M2

## 2025-04-21 DIAGNOSIS — R07.1 INSPIRATORY PAIN: Primary | ICD-10-CM

## 2025-04-21 PROCEDURE — 3074F SYST BP LT 130 MM HG: CPT | Performed by: NURSE PRACTITIONER

## 2025-04-21 PROCEDURE — 3060F POS MICROALBUMINURIA REV: CPT | Performed by: NURSE PRACTITIONER

## 2025-04-21 PROCEDURE — 3079F DIAST BP 80-89 MM HG: CPT | Performed by: NURSE PRACTITIONER

## 2025-04-21 PROCEDURE — 99213 OFFICE O/P EST LOW 20 MIN: CPT | Performed by: NURSE PRACTITIONER

## 2025-04-22 NOTE — H&P
CHIEF COMPLAINT:     Chief Complaint   Patient presents with    Headache     S/s Pt states he has a headache,he feels  fatigue, and he has body aches for 1 week.   OTC meds were taken        HPI:   Farzad Romo is a 49 year old male who presents with complaints of headache, fatigue and body aches for one week.  Reports he's having mild pleuritic pain with inspiration the past few days.  Denies dyspnea, fever.    Current Medications[1]   Past Medical History[2]   Social History:  Short Social Hx on File[3]     REVIEW OF SYSTEMS:   GENERAL: Denies fever, chills,weight change, decreased appetite  SKIN: Denies rashes, skin wounds or ulcers.  EYES: Denies blurred vision or double vision  HENT: Denies congestion, rhinorrhea, sore throat or ear pain  CHEST: Denies chest pain, or palpitations  LUNGS: Denies shortness of breath, cough, or wheezing  GI: Denies abdominal pain, N/V/C/D.   MUSCULOSKELETAL: no arthralgia or swollen joints  LYMPH:  Denies lymphadenopathy  NEURO: Denies headaches or lightheadedness      EXAM:   /82   Pulse 87   Temp 98.1 °F (36.7 °C) (Oral)   Resp 18   Wt 184 lb (83.5 kg)   SpO2 98%   BMI 26.40 kg/m²   GENERAL: well developed, well nourished,in no apparent distress  SKIN: no rashes,no suspicious lesions  HEAD: atraumatic, normocephalic  EYES: conjunctiva clear, EOM intact, PERRLA  EARS: TM's without erythema, no bulging, no retraction, no fluid, bony landmarks visible  NOSE: nostrils patent, no exudates, nasal mucosa pink and noninflamed  THROAT: oral mucosa pink, moist. No visible dental caries. Posterior pharynx is not erythematous. no exudates.  NECK: supple, non-tender  LUNGS: clear to auscultation bilaterally, no wheezes or rhonchi. Breathing is non labored.  CARDIO: RRR without murmur  GI: No visible scars, or masses. BS's present x4. No palpable masses or hepatosplenomegaly.  no tenderness on palpation in all quadrants  EXTREMITIES: no cyanosis, clubbing or edema  LYMPH:  no  lymphadenopathy.      ASSESSMENT AND PLAN:     ASSESSMENT:  Encounter Diagnosis   Name Primary?    Inspiratory pain Yes       PLAN:  Discussed limitations of WIC and advised ER for further work up since symptoms have persisted for one week.  Patient declined ER, ordered stat CXR to r/o underlying PNA.  Patient did not go for the stat x-ray.  Had scheduled appt for 2 days from now.  Called patient to discuss, but no answer.   left with callback number.     Patient Instructions   GO TO ER FOR NEW OR WORSENING SYMPTOMS    CXR WAS CANCELLED DUE TO NOT GOING TODAY FOR STAT ORDER.  A VOICEMAIL WAS LEFT FOR YOU.               [1]   Current Outpatient Medications   Medication Sig Dispense Refill    semaglutide 8 MG/3ML Subcutaneous Solution Pen-injector Inject 2 mg into the skin once a week. 3 mL 1    AMLODIPINE 5 MG Oral Tab TAKE 1 TABLET BY MOUTH IN THE MORNING AND 1 TABLET BEFORE BEDTIME 60 tablet 5    atorvastatin 20 MG Oral Tab TAKE 1 TABLET(20 MG) BY MOUTH EVERY NIGHT 90 tablet 1    METFORMIN HCL 1000 MG Oral Tab TAKE 1 TABLET(1000 MG) BY MOUTH TWICE DAILY 180 tablet 1    losartan 100 MG Oral Tab TAKE 1 TABLET(100 MG) BY MOUTH DAILY 90 tablet 1    montelukast 10 MG Oral Tab Take 1 tablet (10 mg total) by mouth nightly.      omega-3 fatty acids 1000 MG Oral Cap Take 1,000 mg by mouth daily.      FREESTYLE SUNNY 3 SENSOR Does not apply Misc 1 each every 14 (fourteen) days. 2 each 11    Glucose Blood (CONTOUR NEXT TEST) In Vitro Strip 1 each by Other route 3 (three) times daily. 100 strip 5   [2]   Past Medical History:   Allergic rhinitis    Essential hypertension    High blood pressure    Hyperlipidemia    Type II or unspecified type diabetes mellitus without mention of complication, not stated as uncontrolled    Visual impairment   [3]   Social History  Socioeconomic History    Marital status: Single   Tobacco Use    Smoking status: Never     Passive exposure: Never    Smokeless tobacco: Never   Vaping Use    Vaping  status: Never Used   Substance and Sexual Activity    Alcohol use: Not Currently     Comment: rarely    Drug use: No   Other Topics Concern    Caffeine Concern No    Exercise No    Seat Belt No    Special Diet No    Stress Concern No    Weight Concern No     Social Drivers of Health     Food Insecurity: No Food Insecurity (11/7/2024)    Food Insecurity     Food Insecurity: Never true   Transportation Needs: No Transportation Needs (11/7/2024)    Transportation Needs     Lack of Transportation: No   Housing Stability: Low Risk  (11/7/2024)    Housing Stability     Housing Instability: No

## 2025-04-22 NOTE — PATIENT INSTRUCTIONS
GO TO ER FOR NEW OR WORSENING SYMPTOMS    CXR WAS CANCELLED DUE TO NOT GOING TODAY FOR STAT ORDER.  A VOICEMAIL WAS LEFT FOR YOU.

## 2025-05-07 DIAGNOSIS — J30.2 SEASONAL ALLERGIES: ICD-10-CM

## 2025-05-08 ENCOUNTER — TELEPHONE (OUTPATIENT)
Dept: FAMILY MEDICINE CLINIC | Facility: CLINIC | Age: 50
End: 2025-05-08

## 2025-05-08 RX ORDER — MONTELUKAST SODIUM 10 MG/1
10 TABLET ORAL NIGHTLY
Qty: 30 TABLET | Refills: 0 | OUTPATIENT
Start: 2025-05-08

## 2025-05-12 RX ORDER — MONTELUKAST SODIUM 10 MG/1
10 TABLET ORAL NIGHTLY
Qty: 30 TABLET | Refills: 5 | Status: SHIPPED | OUTPATIENT
Start: 2025-05-12

## 2025-05-12 NOTE — TELEPHONE ENCOUNTER
Dear nursing staff,    Please call patient and verify if they are still taking montelukast.    Our records show that this medication was discontinued at his walk-in clinic visit on 11/7/2024.

## 2025-05-29 DIAGNOSIS — Z79.4 TYPE 2 DIABETES MELLITUS WITH HYPERGLYCEMIA, WITH LONG-TERM CURRENT USE OF INSULIN (HCC): ICD-10-CM

## 2025-05-29 DIAGNOSIS — E11.65 TYPE 2 DIABETES MELLITUS WITH HYPERGLYCEMIA, WITH LONG-TERM CURRENT USE OF INSULIN (HCC): ICD-10-CM

## 2025-05-29 RX ORDER — SEMAGLUTIDE 2.68 MG/ML
INJECTION, SOLUTION SUBCUTANEOUS
Qty: 3 ML | Refills: 1 | Status: SHIPPED | OUTPATIENT
Start: 2025-05-29

## 2025-05-29 NOTE — TELEPHONE ENCOUNTER
Requested Prescriptions     Pending Prescriptions Disp Refills    OZEMPIC, 2 MG/DOSE, 8 MG/3ML Subcutaneous Solution Pen-injector [Pharmacy Med Name: OZEMPIC 2MG PER DOSE (8MG/3ML) PFP] 3 mL 1     Sig: INJECT 2MG UNDER THE SKIN ONCE A WEEK     Your appointments       Date & Time Appointment Department ([place])    Jun 25, 2025 9:30 AM CDT Post Op Visit with William Pearson APRN [facility], [address] [place] [address] ([facility])        Jul 21, 2025 3:30 PM CDT Diabetes Pump follow up with Laly Alcantara APRN [address] ([facility] [place])              [address] [facility]  [address] [place] [id number] [phone] [address]  [facility]  [address] [phone]           Last A1c value was 7.1% done 2/24/2025.   Last office visit: 3/31/25 -   Last refill: 3/31/25 [facility]

## 2025-06-01 DIAGNOSIS — I10 PRIMARY HYPERTENSION: ICD-10-CM

## 2025-06-02 RX ORDER — LOSARTAN POTASSIUM 100 MG/1
100 TABLET ORAL DAILY
Qty: 90 TABLET | Refills: 3 | Status: SHIPPED | OUTPATIENT
Start: 2025-06-02

## 2025-06-02 NOTE — TELEPHONE ENCOUNTER
Refill Per Protocol     Requested Prescriptions   Pending Prescriptions Disp Refills    LOSARTAN 100 MG Oral Tab [Pharmacy Med Name: LOSARTAN 100MG TABLETS] 90 tablet 1     Sig: TAKE 1 TABLET(100 MG) BY MOUTH DAILY       Hypertension Medications Protocol Passed - 6/2/2025  2:18 PM        Passed - CMP or BMP in past 12 months        Passed - Last BP reading less than 140/90     BP Readings from Last 1 Encounters:   04/21/25 110/82               Passed - In person appointment or virtual visit in the past 12 mos or appointment in next 3 mos     Recent Outpatient Visits              1 month ago Inspiratory pain    [facility], Walk-In Clinic, [address] Coco Rose NP    Office Visit    2 months ago Type 2 diabetes mellitus with hyperglycemia, with long-term current use of insulin ([facility])    [address] Laly Alcantara APRN    Office Visit    2 months ago Post-operative [address] William Pearson APRN    Office Visit    3 months ago Annual physical exam    [address] Lloyd Guaman MD    Office Visit    4 months ago Post-operative [address] William Pearson APN    Office Visit          Future Appointments         Provider Department Appt Notes    In 3 weeks William Pearson APRN [address] left foot    In 1 month Laly Alcantara APRN [address] tomasz                    Passed - EGFRCR or GFRAA > 50     GFR Evaluation  EGFRCR: 90 , resulted on 2/20/2025          Passed - Medication is active on med list

## 2025-06-05 DIAGNOSIS — E11.65 UNCONTROLLED TYPE 2 DIABETES MELLITUS WITH HYPERGLYCEMIA (HCC): ICD-10-CM

## 2025-06-05 RX ORDER — ATORVASTATIN CALCIUM 20 MG/1
20 TABLET, FILM COATED ORAL NIGHTLY
Qty: 90 TABLET | Refills: 1 | OUTPATIENT
Start: 2025-06-05

## 2025-06-25 ENCOUNTER — OFFICE VISIT (OUTPATIENT)
Facility: LOCATION | Age: 50
End: 2025-06-25
Payer: COMMERCIAL

## 2025-06-25 DIAGNOSIS — E11.621 TYPE 2 DIABETES MELLITUS WITH FOOT ULCER, WITH LONG-TERM CURRENT USE OF INSULIN (HCC): Primary | ICD-10-CM

## 2025-06-25 DIAGNOSIS — Z79.4 TYPE 2 DIABETES MELLITUS WITH FOOT ULCER, WITH LONG-TERM CURRENT USE OF INSULIN (HCC): Primary | ICD-10-CM

## 2025-06-25 DIAGNOSIS — L97.509 TYPE 2 DIABETES MELLITUS WITH FOOT ULCER, WITH LONG-TERM CURRENT USE OF INSULIN (HCC): Primary | ICD-10-CM

## 2025-06-25 DIAGNOSIS — L84 CALLUS OF FOOT: ICD-10-CM

## 2025-06-25 DIAGNOSIS — L60.3 NAIL DYSTROPHY: ICD-10-CM

## 2025-06-25 PROCEDURE — 99213 OFFICE O/P EST LOW 20 MIN: CPT

## 2025-06-25 NOTE — PROGRESS NOTES
[facility]  Progress Note  1/7/2025    Post-op visit.        HPI:     Pt is a pleasant 49 year old male who presents for concern of a callus to left plantar foot. Pt states that he noticed the callus while on vacation and was concerned as it was the area of his previous surgery. No drainage was noted by the patient. Pt had Incision and Drainage of left foot abscess, excision of tibial sesamoid of left foot completed by Dr Arreola on 11/9/2024.   Patient states that he has been following a healthy diet and has been continuing to watch his blood sugars. Last HgbA1C 7.1 on 2/24/25 which is quite impressive as patient's previous HgbA1C was 11.7 in January of 2024.  Denies complaints of nausea, vomiting, fever, chills, or calf pain.    Allergies: Onida and Seasonal   Current Outpatient Medications   Medication Sig Dispense Refill    losartan 100 MG Oral Tab Take 1 tablet (100 mg total) by mouth daily. 90 tablet 3    OZEMPIC, 2 MG/DOSE, 8 MG/3ML Subcutaneous Solution Pen-injector INJECT 2MG UNDER THE SKIN ONCE A WEEK 3 mL 1    montelukast 10 MG Oral Tab Take 1 tablet (10 mg total) by mouth nightly. 30 tablet 5    AMLODIPINE 5 MG Oral Tab TAKE 1 TABLET BY MOUTH IN THE MORNING AND 1 TABLET BEFORE BEDTIME 60 tablet 5    atorvastatin 20 MG Oral Tab TAKE 1 TABLET(20 MG) BY MOUTH EVERY NIGHT 90 tablet 1    METFORMIN HCL 1000 MG Oral Tab TAKE 1 TABLET(1000 MG) BY MOUTH TWICE DAILY 180 tablet 1    montelukast 10 MG Oral Tab Take 1 tablet (10 mg total) by mouth nightly.      omega-3 fatty acids 1000 MG Oral Cap Take 1,000 mg by mouth daily.      FREESTYLE SUNNY 3 SENSOR Does not apply Misc 1 each every 14 (fourteen) days. 2 each 11    Glucose Blood (CONTOUR NEXT TEST) In Vitro Strip 1 each by Other route 3 (three) times daily. 100 strip 5      Past Medical History:    Allergic rhinitis    Essential hypertension    High blood pressure    Hyperlipidemia    Type II or unspecified type diabetes mellitus without mention of  complication, not stated as uncontrolled    Visual impairment      Past Surgical History:   Procedure Laterality Date    Appendectomy  10 yrs ago      Family History   Problem Relation Age of Onset    Hypertension Father     Diabetes Father     Hypertension Mother     Diabetes Mother     Diabetes Sister     Diabetes Sister     Diabetes Brother     Dementia Maternal Aunt     Dementia Maternal Aunt     Dementia Maternal Aunt       Social History     Socioeconomic History    Marital status: Single   Tobacco Use    Smoking status: Never     Passive exposure: Never    Smokeless tobacco: Never   Vaping Use    Vaping status: Never Used   Substance and Sexual Activity    Alcohol use: Not Currently     Comment: rarely    Drug use: No   Other Topics Concern    Caffeine Concern No    Exercise No    Seat Belt No    Special Diet No    Stress Concern No    Weight Concern No           REVIEW OF SYSTEMS:     10 point ROS completed and was negative, except for pertinent positive and negatives stated in subjective.       EXAM:     GENERAL: well developed, well nourished, in no apparent distress    EXTREMITIES:  1. Integument: The patient's nails appear incurvated, elongated, and dystrophic. Skin appears moist, warm, and supple with positive hair growth. There are no color changes. No open lesions. No macerations. Minor amount of hpk to plantar left foot.   2. Vascular: Pedal pulses palpable. Capillary refill normal. No edema noted.  3. Neurological: Normal sharp dull sensation; reflexes normal. Pt has dimished protective sensation to bilateral lower extremities  4. Musculoskeletal: All muscle groups are graded 5/5 in the foot and ankle.     ASSESSMENT AND PLAN:   Diagnoses and all orders for this visit:    Type 2 diabetes mellitus with foot ulcer, with long-term current use of insulin (HCC)    Callus of foot    Nail dystrophy      Plan:   -Patient was seen and evaluated today in clinic.  Chart history reviewed.    -- At today's visit  trimmed down and debrided hyperkeratosis on left foot.  Sharply debrided nails with a sterile nail nipper achieving a 20% reduction in thickness and length, without incident. Slant back procedure performed to ingrown nails. Nails further smoothed with dremel     -Educated patient to continue to maintain tight glucose control by following diet and medication regimen to aid in wound healing.     -Handout given to patient with shoe gear and supportive inserts.     -Educated patient on acute signs of infection and advised patient to seek immediate medical attention if any concerns arise.    -All of the patient's questions and concerns were addressed.  He indicates understanding of these issues and agrees to the plan.     Time spent reviewing pertinent information from patient's chart, reviewing any pertinent imaging, obtaining history and physical exam, discussing and mutually agreeing on a treatment plan, and documenting encounter: 20 minutes     RTC in 2-3 months       CONNOR Campos        [facility]            Dragon speech recognition software was used to prepare this note.  Errors in word recognition may occur.  Please contact me with any questions/concerns with this note.

## 2025-06-26 DIAGNOSIS — E11.65 UNCONTROLLED TYPE 2 DIABETES MELLITUS WITH HYPERGLYCEMIA (HCC): ICD-10-CM

## 2025-06-30 ENCOUNTER — MED REC SCAN ONLY (OUTPATIENT)
Dept: FAMILY MEDICINE CLINIC | Facility: CLINIC | Age: 50
End: 2025-06-30

## 2025-06-30 NOTE — TELEPHONE ENCOUNTER
Refill passed per [facility] protocols.    Requested Prescriptions   Pending Prescriptions Disp Refills    METFORMIN HCL 1000 MG Oral Tab [Pharmacy Med Name: METFORMIN 1000MG TABLETS] 180 tablet 3     Sig: TAKE 1 TABLET(1000 MG) BY MOUTH TWICE DAILY       Diabetes Medication Protocol Passed - 6/30/2025  5:39 PM         2021 10:22

## 2025-07-02 DIAGNOSIS — I10 ESSENTIAL HYPERTENSION: ICD-10-CM

## 2025-07-07 RX ORDER — AMLODIPINE BESYLATE 5 MG/1
5 TABLET ORAL 2 TIMES DAILY
Qty: 180 TABLET | Refills: 3 | Status: SHIPPED | OUTPATIENT
Start: 2025-07-07

## 2025-07-12 DIAGNOSIS — E11.65 UNCONTROLLED TYPE 2 DIABETES MELLITUS WITH HYPERGLYCEMIA (HCC): ICD-10-CM

## 2025-07-14 RX ORDER — ATORVASTATIN CALCIUM 20 MG/1
20 TABLET, FILM COATED ORAL NIGHTLY
Qty: 90 TABLET | Refills: 1 | Status: SHIPPED | OUTPATIENT
Start: 2025-07-14

## 2025-07-14 NOTE — TELEPHONE ENCOUNTER
Requested Prescriptions     Pending Prescriptions Disp Refills    ATORVASTATIN 20 MG Oral Tab [Pharmacy Med Name: ATORVASTATIN 20MG TABLETS] 90 tablet 1     Sig: TAKE 1 TABLET(20 MG) BY MOUTH EVERY NIGHT     Your appointments       Date & Time Appointment Department ([place])    Jul 21, 2025 3:30 PM CDT Diabetes Pump follow up with Laly Alcantara APRN [address] ([facility] [place])        Sep 25, 2025 9:30 AM CDT Follow Up Visit with William Pearson APRN [facility], [address] [place] [address] ([facility])              [facility], [address] [place] [address]  [facility]  [address] [place] [phone] [address]  [facility] [place]  [address] [phone]           Last A1c value was 7.1% done 2/24/2025.   Last office visit: 3/31/25 -   Last refill: 1/14/25 - Lloyd Guaman MD

## 2025-07-23 DIAGNOSIS — E11.65 TYPE 2 DIABETES MELLITUS WITH HYPERGLYCEMIA, WITH LONG-TERM CURRENT USE OF INSULIN (HCC): ICD-10-CM

## 2025-07-23 DIAGNOSIS — Z79.4 TYPE 2 DIABETES MELLITUS WITH HYPERGLYCEMIA, WITH LONG-TERM CURRENT USE OF INSULIN (HCC): ICD-10-CM

## 2025-07-24 RX ORDER — SEMAGLUTIDE 2.68 MG/ML
INJECTION, SOLUTION SUBCUTANEOUS
Qty: 3 ML | Refills: 1 | Status: SHIPPED | OUTPATIENT
Start: 2025-07-24

## 2025-07-24 NOTE — TELEPHONE ENCOUNTER
Requested Prescriptions     Pending Prescriptions Disp Refills    OZEMPIC, 2 MG/DOSE, 8 MG/3ML Subcutaneous Solution Pen-injector [Pharmacy Med Name: OZEMPIC 2MG PER DOSE (8MG/3ML) PFP] 3 mL 1     Sig: INJECT 2MG UNDER THE SKIN ONCE A WEEK     Future Appointments   Date Time Provider Department Center   9/25/2025  9:30 AM William Pearson APRN ECPLPOD2 EC PLFD   10/14/2025  1:45 PM Laly Alcantara APRN EMGDIABCTSPL EM[facility] DIAB PLF     Last A1c value was 7.1% done 2/24/2025.  Refill 05/29/25 [place]   LOV 03/31/25 [place]

## (undated) DIAGNOSIS — H35.9 RETINA DISORDER, BILATERAL: Primary | ICD-10-CM

## (undated) DIAGNOSIS — E55.9 VITAMIN D DEFICIENCY: ICD-10-CM

## (undated) DIAGNOSIS — E11.65 UNCONTROLLED TYPE 2 DIABETES MELLITUS WITH HYPERGLYCEMIA (HCC): ICD-10-CM

## (undated) DEVICE — SUT COAT VCRL 2-0 27IN SH ABSRB UD 26MM 1/2

## (undated) DEVICE — 3M™ STERI-DRAPE™ U-DRAPE 1015: Brand: STERI-DRAPE™

## (undated) DEVICE — SUT MCRYL 4-0 27IN ABSRB UD 19MM PS-2 3/8

## (undated) DEVICE — SOLUTION IRRIG 1000ML 0.9% NACL USP BTL

## (undated) DEVICE — GOWN,SIRUS,FABRIC-REINFORCED,X-LARGE: Brand: MEDLINE

## (undated) DEVICE — SLEEVE COMPR MD KNEE LEN SGL USE KENDALL SCD

## (undated) DEVICE — GLOVE SUR 7.5 SENSICARE PI PIP CRM PWD F

## (undated) DEVICE — GLOVE SUR 7.5 SENSICARE PI PIP GRN PWD F

## (undated) DEVICE — LOWER EXTREMITY CDS-LF: Brand: MEDLINE INDUSTRIES, INC.

## (undated) DEVICE — DISPOSABLE TOURNIQUET CUFF SINGLE BLADDER, DUAL PORT AND QUICK CONNECT CONNECTOR: Brand: COLOR CUFF

## (undated) DEVICE — C-ARM: Brand: UNBRANDED

## (undated) DEVICE — SUT ETHLN 3-0 18IN PS-1 NABSRB BLK 24MM 3/8 C

## (undated) DEVICE — DRESSING ADAPTIC L16XW3IN OIL ADH

## (undated) DEVICE — 450 ML BOTTLE OF 0.05% CHLORHEXIDINE GLUCONATE IN 99.95% STERILE WATER FOR IRRIGATION, USP AND APPLICATOR.: Brand: IRRISEPT ANTIMICROBIAL WOUND LAVAGE

## (undated) DEVICE — BANDAGE,GAUZE,BULKEE II,4.5"X4.1YD,STRL: Brand: MEDLINE

## (undated) DEVICE — BANDAGE,COHESIVE,TAN,4X5YD,LF,STRL: Brand: MEDLINE

## (undated) DEVICE — Device

## (undated) NOTE — LETTER
Date: 1/15/2020    Patient Name: Delroy Roberts          To Whom it may concern: This letter has been written at the patient's request. The above patient was seen at the [facility] for treatment of a medical condition.     This patient s

## (undated) NOTE — LETTER
08/12/18        [facility] [phone]      Dear Kp Werner records indicate that you have lab work and or testing that was ordered for you and has not yet been completed:          Microalb/Creat Ratio, Random Urine [E]    To provide you with the best possible care, please complete these orders at your earliest convenience. If you have recently completed these orders please disregard this letter. If you have any questions please call the office at Dept: [phone].      Thank you,       Ama Hernandez PA-C

## (undated) NOTE — MR AVS SNAPSHOT
[address]  [address]  [address] [id number] [phone]               Thank you for choosing us for your health care visit with Codie Taylor PA-C.   We are glad to serve you and happy to provide you with this summar Take 1 tablet (50 mg total) by mouth once daily.    Commonly known as:  COZAAR           MetFORMIN HCl 1000 MG Tabs   TAKE 1 TABLET(1000 MG) BY MOUTH TWICE DAILY WITH MEALS   Commonly known as:  GLUCOPHAGE           ValACYclovir HCl 500 MG Tabs   Take 1 tab For medical emergencies, dial 911.            Visit [facility] online at  [email or web address]

## (undated) NOTE — LETTER
02/16/18        [facility] [phone]      Dear Humza Munguia records indicate that you have outstanding lab work and or testing that was ordered for you and has not yet been completed:          Hemoglobin A1C [E]      Lipid P

## (undated) NOTE — ED AVS SNAPSHOT
Rachelle Daily   MRN: [id number]    Department:  [facility] Emergency Department   Date of Visit:  3/12/2020           Disclosure     Insurance plans vary and the physician(s) referred by the ER may not be covered by your plan.  Please contact y tell this physician (or your personal doctor if your instructions are to return to your personal doctor) about any new or lasting problems. The primary care or specialist physician will see patients referred from the [facility] Emergency Department.  Abdirizak Rollins

## (undated) NOTE — LETTER
06/04/19    Ochoa Farmer      PCP: Jarocho Blair MD  [address] [facility] [address]  [address] [facility]       [phone]        Dear Demario Joyce,    Diabetes is a serious chronic disease that requires regular visits with your doctor to monitor your condition.  There are five janice

## (undated) NOTE — LETTER
12/17/21        [phone]      Dear Joselyn John records indicate that you have outstanding lab work and or testing that was ordered for you and has not yet been completed:  Lipid panel  CMP  Hemoglobin A1C  Urine

## (undated) NOTE — LETTER
[address]  [address]  Authorization for Surgical Operation and Procedure     Date:___________                                                                                                         Time:__________  I hereby authorize Surgeon(s):  Sharad Arreola DPM, my physician and his/her assistants (if applicable), which may include medical students, residents, and/or fellows, to perform the following surgical operation/ procedure and administer such anesthesia as may be determined necessary by my physician:  Operation/Procedure name (s) Procedure(s):  IRRIGATION & DEBRIDEMENT AND DRAINAGE OF LEFT FOOT ABSCESS, EXCISION OF SESAMOIDS OF LEFT FOOT, BONE BIOPSIES OF LEFT FOOT on Farzad Romo   2.   I recognize that during the surgical operation/procedure, unforeseen conditions may necessitate additional or different procedures than those listed above.  I, therefore, further authorize and request that the above-named surgeon, assistants, or designees perform such procedures as are, in their judgment, necessary and desirable.    3.   My surgeon/physician has discussed prior to my surgery the potential benefits, risks and side effects of this procedure; the likelihood of achieving goals; and potential problems that might occur during recuperation.  They also discussed reasonable alternatives to the procedure, including risks, benefits, and side effects related to the alternatives and risks related to not receiving this procedure.  I have had all my questions answered and I acknowledge that no guarantee has been made as to the result that may be obtained.    4.   Should the need arise during my operation/procedure, which includes change of level of care prior to discharge, I also consent to the administration of blood and/or blood products.  Further, I understand that despite careful testing and screening of blood or blood products by collecting agencies, I may still be  subject to ill effects as a result of receiving a blood transfusion and/or blood products.  The following are some, but not all, of the potential risks that can occur: fever and allergic reactions, hemolytic reactions, transmission of diseases such as Hepatitis, AIDS and Cytomegalovirus (CMV) and fluid overload.  In the event that I wish to have an autologous transfusion of my own blood, or a directed donor transfusion, I will discuss this with my physician.  Check only if Refusing Blood or Blood Products  I understand refusal of blood or blood products as deemed necessary by my physician may have serious consequences to my condition to include possible death. I hereby assume responsibility for my refusal and release the hospital, its personnel, and my physicians from any responsibility for the consequences of my refusal.          o  Refuse      5.   I authorize the use of any specimen, organs, tissues, body parts or foreign objects that may be removed from my body during the operation/procedure for diagnosis, research or teaching purposes and their subsequent disposal by hospital authorities.  I also authorize the release of specimen test results and/or written reports to my treating physician on the hospital medical staff or other referring or consulting physicians involved in my care, at the discretion of the Pathologist or my treating physician.    6.   I consent to the photographing or videotaping of the operations or procedures to be performed, including appropriate portions of my body for medical, scientific, or educational purposes, provided my identity is not revealed by the pictures or by descriptive texts accompanying them.  If the procedure has been photographed/videotaped, the surgeon will obtain the original picture, image, videotape or CD.  The hospital will not be responsible for storage, release or maintenance of the picture, image, tape or CD.    7.   I consent to the presence of a product  specialist or observers in the operating room as deemed necessary by my physician or their designees.    8.   I recognize that in the event my procedure results in extended X-Ray/fluoroscopy time, I may develop a skin reaction.    9. If I have a Do Not Attempt Resuscitation (DNAR) order in place, that status will be suspended while in the operating room, procedural suite, and during the recovery period unless otherwise explicitly stated by me (or a person authorized to consent on my behalf). The surgeon or my attending physician will determine when the applicable recovery period ends for purposes of reinstating the DNAR order.  10. Patients having a sterilization procedure: I understand that if the procedure is successful the results will be permanent and it will therefore be impossible for me to inseminate, conceive, or bear children.  I also understand that the procedure is intended to result in sterility, although the result has not been guaranteed.   11. I acknowledge that my physician has explained sedation/analgesia administration to me including the risk and benefits I consent to the administration of sedation/analgesia as may be necessary or desirable in the judgment of my physician.    I CERTIFY THAT I HAVE READ AND FULLY UNDERSTAND THE ABOVE CONSENT TO OPERATION and/or OTHER PROCEDURE.    _________________________________________  __________________________________  Signature of Patient     Signature of Responsible Person         ___________________________________         Printed Name of Responsible Person           _________________________________                 Relationship to Patient  _________________________________________  ______________________________  Signature of Witness          Date  Time      Patient Name: Farzad MILA Romo     : 1975                 Printed: 2024     Medical Record #: [id number]                     Page 1 of 2                                    Edward  [address]  [address]    Consent for Anesthesia    I, Farzad Romo agree to be cared for by an anesthesiologist, who is specially trained to monitor me and give me medicine to put me to sleep or keep me comfortable during my procedure    I understand that my anesthesiologist is not an employee or agent of [facility] or [employer]. He or she works for [employer].    As the patient asking for anesthesia services, I agree to:  Allow the anesthesiologist (anesthesia doctor) to give me medicine and do additional procedures as necessary. Some examples are: Starting or using an “IV” to give me medicine, fluids or blood during my procedure, and having a breathing tube placed to help me breathe when I’m asleep (intubation). In the event that my heart stops working properly, I understand that my anesthesiologist will make every effort to sustain my life, unless otherwise directed by [facility] Do Not Resuscitate documents.  Tell my anesthesia doctor before my procedure:  If I am pregnant.  The last time that I ate or drank.  All of the medicines I take (including prescriptions, herbal supplements, and pills I can buy without a prescription (including street drugs/illegal medications). Failure to inform my anesthesiologist about these medicines may increase my risk of anesthetic complications.  If I am allergic to anything or have had a reaction to anesthesia before.  I understand how the anesthesia medicine will help me (benefits).  I understand that with any type of anesthesia medicine there are risks:  The most common risks are: nausea, vomiting, sore throat, muscle soreness, damage to my eyes, mouth, or teeth (from breathing tube placement).  Rare risks include: remembering what happened during my procedure, allergic reactions to medications, injury to my airway, heart, lungs, vision, nerves, or muscles and in extremely rare instances death.  My  doctor has explained to me other choices available to me for my care (alternatives).  Pregnant Patients (“epidural”):  I understand that the risks of having an epidural (medicine given into my back to help control pain during labor), include itching, low blood pressure, difficulty urinating, headache or slowing of the baby’s heart. Very rare risks include infection, bleeding, seizure, irregular heart rhythms and nerve injury.  Regional Anesthesia (“spinal”, “epidural”, & “nerve blocks”):  I understand that rare but potential complications include headache, bleeding, infection, seizure, irregular heart rhythms, and nerve injury.    I can change my mind about having anesthesia services at any time before I get the medicine.    _____________________________________________________________________________  Patient (or Representative) Signature/Relationship to Patient  Date   Time    _____________________________________________________________________________   Name (if used)    Language/Organization   Time    _____________________________________________________________________________  Anesthesiologist Signature     Date   Time  I have discussed the procedure and information above with the patient (or patient’s representative) and answered their questions. The patient or their representative has agreed to have anesthesia services.    _____________________________________________________________________________  Witness        Date   Time  I have verified that the signature is that of the patient or patient’s representative, and that it was signed before the procedure  Patient Name: Farzad Romo     : 1975                 Printed: 2024     Medical Record #: [id number]                     Page 2 of 2

## (undated) NOTE — LETTER
07/24/17        [facility] [phone]      Dear Fred Topete records indicate that you have outstanding lab work and or testing that was ordered for you and has not yet been completed:          CMP now      Lipids now      Mi

## (undated) NOTE — LETTER
Date: 3/19/2020    Patient Name: Martina Lin          To Whom it may concern: The above patient was seen at the [facility] for treatment of a medical condition.     This patient should be excused from attending work 3/16/20-3/22/20.

## (undated) NOTE — LETTER
05/18/18        [phone]      Dear Caryle Finely records indicate that you have outstanding lab work and or testing that was ordered for you and has not yet been completed:     To provide you with the best possible

## (undated) NOTE — LETTER
Your patient was recently seen at the [facility] for a hospital follow-up visit. The visit note is attached. Please contact the clinic with any questions at [phone].    Thank you,  KARMEN Otero

## (undated) NOTE — LETTER
02/04/19        [address] [phone]      Dear Phil Natarajan records indicate that you have outstanding lab work and or testing that was ordered for you and has not yet been completed:  Orders Placed This Encounter      Micro
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
## (undated) NOTE — Clinical Note
03/10/2017        [phone]      Dear Jeff Guerrero records indicate that you have outstanding lab work and or testing that was ordered for you and has not yet been completed:      Hemoglobin A1C [E]  Comp Metaboli

## (undated) NOTE — LETTER
12/17/18        [facility] [phone]      Dear Joselyn John records indicate that you have outstanding lab work and or testing that was ordered for you and has not yet been completed:  Orders Placed This Encounter      Hemog

## (undated) NOTE — MR AVS SNAPSHOT
[address] [facility] [address] [phone]               Thank you for choosing us for your health care visit with Junita Duane, MD.  We are glad to serve you and happy to provide you with this summary of your Health Goals discussed Today        Last Edited    Sep 26, 2016    HEMOGLOBIN A1C < 7.0 9/26/2016  7:57 PM by Ge Wheatley RN      Goal Comments    Discussed need to keep A1c under 7.0 including long and short term risks of higher A1c  Reinforced need to

## (undated) NOTE — LETTER
Date: 3/16/2020    Patient Name: Diamante Davison          To Whom it may concern: The above patient was seen at the [facility] for treatment of a medical condition. This patient should be excused from attending work 3/16/20-3/22/20.

## (undated) NOTE — MR AVS SNAPSHOT
[address]  [address]  [phone]  [phone]               Thank you for choosing us for your health care visit with Abdias Ma PA-C.   We are glad to serve you and happy to provide you with this summar gabapentin 300 MG Caps   TAKE 1 CAPSULE(300 MG) BY MOUTH THREE TIMES DAILY   Commonly known as:  NEURONTIN           Glucose Blood Strp   TEST THREE TIMES DAILY   Commonly known as:  SANJEEV CONTOUR NEXT TEST           Insulin Syringe 31G X 5/16\" 1 ML Misc discharge instructions in [employer] by going to Visits < Admission Summaries. If you've been to the Emergency Department or your doctor's office, you can view your past visit information in [employer] by going to Visits < Visit Summaries. [employer] questions?

## (undated) NOTE — MR AVS SNAPSHOT
[address] [facility] [address] [phone]               Thank you for choosing us for your health care visit with Anastasia Cheadle, MD.  We are glad to serve you and happy to provide you with this summary of your Commonly known as:  [address] discussed Today        Last Edited    Sep 26, 2016    HEMOGLOBIN A1C < 7.0 9/26/2016  7:57 PM by Maik Noel, CLAUDINE      Goal Comments    Discussed need to keep A1c under 7.0 including long and sh

## (undated) NOTE — MR AVS SNAPSHOT
[address]  [address]  [address] [id number] [phone]               Thank you for choosing us for your health care visit with Codie Taylor PA-C.   We are glad to serve you and happy to provide you with this summar Insulin Syringe 31G X 5/16\" 1 ML Misc   Use as directed           LANTUS 100 UNIT/ML Soln   Generic drug:  insulin glargine   Inject 25 Units into the skin nightly.            Losartan Potassium 50 MG Tabs   Take 1 tablet (50 mg total) by mouth once daily HEMOGLOBIN A1C < 7.0 9/26/2016  7:57 PM by Sandra Payton, CLAUDINE      Goal Comments    Discussed need to keep A1c under 7.0 including long and short term risks of higher A1c  Reinforced need to take meds as prescribed and to check sugars at least daily in [facility]

## (undated) NOTE — LETTER
Date: 3/19/2020    Patient Name: Delroy Roberts          To Whom it may concern: The above patient was seen at the [facility] for treatment of a medical condition.     This patient should be excused from attending work 3/16/20-3/22/20.

## (undated) NOTE — LETTER
Date: 4/15/2020     Jc Marker  [address] [facility] [address]                  Sirisha Cooper,     As discussed, I have attached a copy of your completed forms which have been sent to your employer per your request.             Thank you and stay well,   Wh